# Patient Record
Sex: MALE | Race: WHITE | NOT HISPANIC OR LATINO | Employment: OTHER | URBAN - NONMETROPOLITAN AREA
[De-identification: names, ages, dates, MRNs, and addresses within clinical notes are randomized per-mention and may not be internally consistent; named-entity substitution may affect disease eponyms.]

---

## 2017-09-14 ENCOUNTER — APPOINTMENT (OUTPATIENT)
Dept: ANESTHESIOLOGY | Facility: HOSPITAL | Age: 77
End: 2017-09-14
Attending: OPHTHALMOLOGY
Payer: MEDICARE

## 2017-09-14 PROCEDURE — 00142 ANES PX ON EYE LENS SURGERY: CPT | Mod: QZ | Performed by: NURSE ANESTHETIST, CERTIFIED REGISTERED

## 2017-09-14 PROCEDURE — 99100 ANES PT EXTEME AGE<1 YR&>70: CPT | Performed by: NURSE ANESTHETIST, CERTIFIED REGISTERED

## 2024-05-31 ENCOUNTER — HOSPITAL ENCOUNTER (INPATIENT)
Facility: CLINIC | Age: 84
LOS: 11 days | Discharge: HOME | DRG: 394 | End: 2024-06-11
Attending: EMERGENCY MEDICINE | Admitting: STUDENT IN AN ORGANIZED HEALTH CARE EDUCATION/TRAINING PROGRAM
Payer: MEDICARE

## 2024-05-31 ENCOUNTER — APPOINTMENT (OUTPATIENT)
Dept: CT IMAGING | Facility: CLINIC | Age: 84
DRG: 394 | End: 2024-05-31
Attending: EMERGENCY MEDICINE
Payer: MEDICARE

## 2024-05-31 DIAGNOSIS — R10.84 GENERALIZED ABDOMINAL PAIN: ICD-10-CM

## 2024-05-31 DIAGNOSIS — K56.7 ILEUS (H): ICD-10-CM

## 2024-05-31 DIAGNOSIS — I48.91 ATRIAL FIBRILLATION WITH RVR (H): Primary | ICD-10-CM

## 2024-05-31 LAB
ALBUMIN SERPL BCG-MCNC: 3.4 G/DL (ref 3.5–5.2)
ALBUMIN UR-MCNC: NEGATIVE MG/DL
ALP SERPL-CCNC: 79 U/L (ref 40–150)
ALT SERPL W P-5'-P-CCNC: 14 U/L (ref 0–70)
ANION GAP SERPL CALCULATED.3IONS-SCNC: 12 MMOL/L (ref 7–15)
APPEARANCE UR: CLEAR
AST SERPL W P-5'-P-CCNC: 20 U/L (ref 0–45)
BASOPHILS # BLD AUTO: 0 10E3/UL (ref 0–0.2)
BASOPHILS NFR BLD AUTO: 0 %
BILIRUB SERPL-MCNC: 1.3 MG/DL
BILIRUB UR QL STRIP: NEGATIVE
BUN SERPL-MCNC: 17.8 MG/DL (ref 8–23)
CALCIUM SERPL-MCNC: 8.2 MG/DL (ref 8.8–10.2)
CHLORIDE SERPL-SCNC: 104 MMOL/L (ref 98–107)
COLOR UR AUTO: ABNORMAL
CREAT SERPL-MCNC: 0.77 MG/DL (ref 0.67–1.17)
DEPRECATED HCO3 PLAS-SCNC: 22 MMOL/L (ref 22–29)
EGFRCR SERPLBLD CKD-EPI 2021: 89 ML/MIN/1.73M2
EOSINOPHIL # BLD AUTO: 0 10E3/UL (ref 0–0.7)
EOSINOPHIL NFR BLD AUTO: 0 %
ERYTHROCYTE [DISTWIDTH] IN BLOOD BY AUTOMATED COUNT: 12.5 % (ref 10–15)
GLUCOSE SERPL-MCNC: 127 MG/DL (ref 70–99)
GLUCOSE UR STRIP-MCNC: NEGATIVE MG/DL
HCT VFR BLD AUTO: 39.7 % (ref 40–53)
HGB BLD-MCNC: 13.7 G/DL (ref 13.3–17.7)
HGB UR QL STRIP: ABNORMAL
IMM GRANULOCYTES # BLD: 0.1 10E3/UL
IMM GRANULOCYTES NFR BLD: 0 %
KETONES UR STRIP-MCNC: NEGATIVE MG/DL
LEUKOCYTE ESTERASE UR QL STRIP: NEGATIVE
LIPASE SERPL-CCNC: 20 U/L (ref 13–60)
LYMPHOCYTES # BLD AUTO: 1.1 10E3/UL (ref 0.8–5.3)
LYMPHOCYTES NFR BLD AUTO: 9 %
MCH RBC QN AUTO: 31.9 PG (ref 26.5–33)
MCHC RBC AUTO-ENTMCNC: 34.5 G/DL (ref 31.5–36.5)
MCV RBC AUTO: 92 FL (ref 78–100)
MONOCYTES # BLD AUTO: 1 10E3/UL (ref 0–1.3)
MONOCYTES NFR BLD AUTO: 8 %
MUCOUS THREADS #/AREA URNS LPF: PRESENT /LPF
NEUTROPHILS # BLD AUTO: 9.7 10E3/UL (ref 1.6–8.3)
NEUTROPHILS NFR BLD AUTO: 83 %
NITRATE UR QL: NEGATIVE
NRBC # BLD AUTO: 0 10E3/UL
NRBC BLD AUTO-RTO: 0 /100
PH UR STRIP: 6 [PH] (ref 5–7)
PLATELET # BLD AUTO: 141 10E3/UL (ref 150–450)
POTASSIUM SERPL-SCNC: 4.2 MMOL/L (ref 3.4–5.3)
PROT SERPL-MCNC: 6.2 G/DL (ref 6.4–8.3)
RBC # BLD AUTO: 4.3 10E6/UL (ref 4.4–5.9)
RBC URINE: 1 /HPF
SODIUM SERPL-SCNC: 138 MMOL/L (ref 135–145)
SP GR UR STRIP: 1.02 (ref 1–1.03)
TROPONIN T SERPL HS-MCNC: 21 NG/L
UROBILINOGEN UR STRIP-MCNC: NORMAL MG/DL
WBC # BLD AUTO: 11.9 10E3/UL (ref 4–11)
WBC URINE: <1 /HPF

## 2024-05-31 PROCEDURE — 84155 ASSAY OF PROTEIN SERUM: CPT | Performed by: EMERGENCY MEDICINE

## 2024-05-31 PROCEDURE — 84484 ASSAY OF TROPONIN QUANT: CPT | Performed by: EMERGENCY MEDICINE

## 2024-05-31 PROCEDURE — 74177 CT ABD & PELVIS W/CONTRAST: CPT | Mod: MG

## 2024-05-31 PROCEDURE — 83690 ASSAY OF LIPASE: CPT | Performed by: EMERGENCY MEDICINE

## 2024-05-31 PROCEDURE — 250N000013 HC RX MED GY IP 250 OP 250 PS 637: Performed by: PHYSICIAN ASSISTANT

## 2024-05-31 PROCEDURE — 99285 EMERGENCY DEPT VISIT HI MDM: CPT | Mod: 25

## 2024-05-31 PROCEDURE — 258N000003 HC RX IP 258 OP 636: Performed by: EMERGENCY MEDICINE

## 2024-05-31 PROCEDURE — 99222 1ST HOSP IP/OBS MODERATE 55: CPT | Mod: AI | Performed by: PHYSICIAN ASSISTANT

## 2024-05-31 PROCEDURE — 250N000011 HC RX IP 250 OP 636: Performed by: EMERGENCY MEDICINE

## 2024-05-31 PROCEDURE — 120N000001 HC R&B MED SURG/OB

## 2024-05-31 PROCEDURE — 96360 HYDRATION IV INFUSION INIT: CPT | Mod: 59

## 2024-05-31 PROCEDURE — 81001 URINALYSIS AUTO W/SCOPE: CPT | Performed by: EMERGENCY MEDICINE

## 2024-05-31 PROCEDURE — 258N000003 HC RX IP 258 OP 636: Performed by: PHYSICIAN ASSISTANT

## 2024-05-31 PROCEDURE — 85025 COMPLETE CBC W/AUTO DIFF WBC: CPT | Performed by: EMERGENCY MEDICINE

## 2024-05-31 PROCEDURE — G1010 CDSM STANSON: HCPCS

## 2024-05-31 PROCEDURE — 36415 COLL VENOUS BLD VENIPUNCTURE: CPT | Performed by: EMERGENCY MEDICINE

## 2024-05-31 PROCEDURE — 250N000011 HC RX IP 250 OP 636: Mod: JZ | Performed by: EMERGENCY MEDICINE

## 2024-05-31 PROCEDURE — 84460 ALANINE AMINO (ALT) (SGPT): CPT | Performed by: EMERGENCY MEDICINE

## 2024-05-31 PROCEDURE — C9113 INJ PANTOPRAZOLE SODIUM, VIA: HCPCS | Performed by: PHYSICIAN ASSISTANT

## 2024-05-31 PROCEDURE — 250N000011 HC RX IP 250 OP 636: Performed by: PHYSICIAN ASSISTANT

## 2024-05-31 RX ORDER — OXYCODONE HYDROCHLORIDE 5 MG/1
5 TABLET ORAL EVERY 4 HOURS PRN
Status: ON HOLD | COMMUNITY
End: 2024-06-11

## 2024-05-31 RX ORDER — SENNOSIDES 8.6 MG
2 TABLET ORAL 2 TIMES DAILY
COMMUNITY

## 2024-05-31 RX ORDER — LIDOCAINE 40 MG/G
CREAM TOPICAL
Status: DISCONTINUED | OUTPATIENT
Start: 2024-05-31 | End: 2024-06-11 | Stop reason: HOSPADM

## 2024-05-31 RX ORDER — OMEPRAZOLE 40 MG/1
40 CAPSULE, DELAYED RELEASE ORAL DAILY
COMMUNITY

## 2024-05-31 RX ORDER — NALOXONE HYDROCHLORIDE 0.4 MG/ML
0.4 INJECTION, SOLUTION INTRAMUSCULAR; INTRAVENOUS; SUBCUTANEOUS
Status: DISCONTINUED | OUTPATIENT
Start: 2024-05-31 | End: 2024-06-11 | Stop reason: HOSPADM

## 2024-05-31 RX ORDER — METHOCARBAMOL 500 MG/1
500 TABLET, FILM COATED ORAL EVERY 6 HOURS PRN
COMMUNITY

## 2024-05-31 RX ORDER — ONDANSETRON 4 MG/1
4 TABLET, ORALLY DISINTEGRATING ORAL EVERY 6 HOURS PRN
Status: DISCONTINUED | OUTPATIENT
Start: 2024-05-31 | End: 2024-06-11 | Stop reason: HOSPADM

## 2024-05-31 RX ORDER — MORPHINE SULFATE 4 MG/ML
4 INJECTION, SOLUTION INTRAMUSCULAR; INTRAVENOUS ONCE
Status: COMPLETED | OUTPATIENT
Start: 2024-05-31 | End: 2024-05-31

## 2024-05-31 RX ORDER — ACETAMINOPHEN 325 MG/1
650 TABLET ORAL EVERY 4 HOURS PRN
Status: DISCONTINUED | OUTPATIENT
Start: 2024-05-31 | End: 2024-06-01

## 2024-05-31 RX ORDER — OXYCODONE HYDROCHLORIDE 5 MG/1
5 TABLET ORAL EVERY 4 HOURS PRN
Status: DISCONTINUED | OUTPATIENT
Start: 2024-05-31 | End: 2024-06-01

## 2024-05-31 RX ORDER — SODIUM CHLORIDE 9 MG/ML
INJECTION, SOLUTION INTRAVENOUS CONTINUOUS
Status: DISCONTINUED | OUTPATIENT
Start: 2024-05-31 | End: 2024-06-07

## 2024-05-31 RX ORDER — TAMSULOSIN HYDROCHLORIDE 0.4 MG/1
0.4 CAPSULE ORAL AT BEDTIME
Status: DISCONTINUED | OUTPATIENT
Start: 2024-05-31 | End: 2024-06-11 | Stop reason: HOSPADM

## 2024-05-31 RX ORDER — SODIUM CHLORIDE 9 MG/ML
INJECTION, SOLUTION INTRAVENOUS CONTINUOUS
Status: DISCONTINUED | OUTPATIENT
Start: 2024-05-31 | End: 2024-05-31

## 2024-05-31 RX ORDER — ATORVASTATIN CALCIUM 40 MG/1
40 TABLET, FILM COATED ORAL DAILY
COMMUNITY

## 2024-05-31 RX ORDER — ONDANSETRON 2 MG/ML
4 INJECTION INTRAMUSCULAR; INTRAVENOUS ONCE
Status: COMPLETED | OUTPATIENT
Start: 2024-05-31 | End: 2024-05-31

## 2024-05-31 RX ORDER — ONDANSETRON 2 MG/ML
4 INJECTION INTRAMUSCULAR; INTRAVENOUS EVERY 6 HOURS PRN
Status: DISCONTINUED | OUTPATIENT
Start: 2024-05-31 | End: 2024-06-11 | Stop reason: HOSPADM

## 2024-05-31 RX ORDER — ONDANSETRON 2 MG/ML
INJECTION INTRAMUSCULAR; INTRAVENOUS
Status: COMPLETED
Start: 2024-05-31 | End: 2024-05-31

## 2024-05-31 RX ORDER — TAMSULOSIN HYDROCHLORIDE 0.4 MG/1
0.4 CAPSULE ORAL AT BEDTIME
COMMUNITY

## 2024-05-31 RX ORDER — MORPHINE SULFATE 2 MG/ML
2 INJECTION, SOLUTION INTRAMUSCULAR; INTRAVENOUS ONCE
Status: DISCONTINUED | OUTPATIENT
Start: 2024-05-31 | End: 2024-05-31

## 2024-05-31 RX ORDER — IOPAMIDOL 755 MG/ML
100 INJECTION, SOLUTION INTRAVASCULAR ONCE
Status: COMPLETED | OUTPATIENT
Start: 2024-05-31 | End: 2024-05-31

## 2024-05-31 RX ORDER — HYDROMORPHONE HCL IN WATER/PF 6 MG/30 ML
0.2 PATIENT CONTROLLED ANALGESIA SYRINGE INTRAVENOUS
Status: DISCONTINUED | OUTPATIENT
Start: 2024-05-31 | End: 2024-06-01

## 2024-05-31 RX ORDER — NALOXONE HYDROCHLORIDE 0.4 MG/ML
0.2 INJECTION, SOLUTION INTRAMUSCULAR; INTRAVENOUS; SUBCUTANEOUS
Status: DISCONTINUED | OUTPATIENT
Start: 2024-05-31 | End: 2024-06-11 | Stop reason: HOSPADM

## 2024-05-31 RX ORDER — ACETAMINOPHEN 325 MG/1
975 TABLET ORAL EVERY 6 HOURS PRN
COMMUNITY

## 2024-05-31 RX ADMIN — TAMSULOSIN HYDROCHLORIDE 0.4 MG: 0.4 CAPSULE ORAL at 21:05

## 2024-05-31 RX ADMIN — HYDROMORPHONE HYDROCHLORIDE 0.2 MG: 0.2 INJECTION, SOLUTION INTRAMUSCULAR; INTRAVENOUS; SUBCUTANEOUS at 22:28

## 2024-05-31 RX ADMIN — HYDROMORPHONE HYDROCHLORIDE 0.2 MG: 0.2 INJECTION, SOLUTION INTRAMUSCULAR; INTRAVENOUS; SUBCUTANEOUS at 18:29

## 2024-05-31 RX ADMIN — ONDANSETRON 4 MG: 2 INJECTION INTRAMUSCULAR; INTRAVENOUS at 10:33

## 2024-05-31 RX ADMIN — SODIUM CHLORIDE: 900 INJECTION INTRAVENOUS at 20:48

## 2024-05-31 RX ADMIN — SODIUM CHLORIDE: 900 INJECTION INTRAVENOUS at 12:01

## 2024-05-31 RX ADMIN — SODIUM CHLORIDE: 9 INJECTION, SOLUTION INTRAVENOUS at 10:34

## 2024-05-31 RX ADMIN — IOPAMIDOL 100 ML: 755 INJECTION, SOLUTION INTRAVENOUS at 09:03

## 2024-05-31 RX ADMIN — SODIUM CHLORIDE 1000 ML: 9 INJECTION, SOLUTION INTRAVENOUS at 08:12

## 2024-05-31 RX ADMIN — MORPHINE SULFATE 4 MG: 4 INJECTION INTRAVENOUS at 10:23

## 2024-05-31 RX ADMIN — PANTOPRAZOLE SODIUM 40 MG: 40 INJECTION, POWDER, FOR SOLUTION INTRAVENOUS at 12:02

## 2024-05-31 ASSESSMENT — ACTIVITIES OF DAILY LIVING (ADL)
ADLS_ACUITY_SCORE: 35
ADLS_ACUITY_SCORE: 41
ADLS_ACUITY_SCORE: 43
ADLS_ACUITY_SCORE: 35
ADLS_ACUITY_SCORE: 43
ADLS_ACUITY_SCORE: 33
ADLS_ACUITY_SCORE: 35
ADLS_ACUITY_SCORE: 35
ADLS_ACUITY_SCORE: 43
ADLS_ACUITY_SCORE: 35
ADLS_ACUITY_SCORE: 43
ADLS_ACUITY_SCORE: 41
ADLS_ACUITY_SCORE: 35
ADLS_ACUITY_SCORE: 43
ADLS_ACUITY_SCORE: 35
ADLS_ACUITY_SCORE: 35

## 2024-05-31 ASSESSMENT — COLUMBIA-SUICIDE SEVERITY RATING SCALE - C-SSRS
2. HAVE YOU ACTUALLY HAD ANY THOUGHTS OF KILLING YOURSELF IN THE PAST MONTH?: NO
1. IN THE PAST MONTH, HAVE YOU WISHED YOU WERE DEAD OR WISHED YOU COULD GO TO SLEEP AND NOT WAKE UP?: NO
6. HAVE YOU EVER DONE ANYTHING, STARTED TO DO ANYTHING, OR PREPARED TO DO ANYTHING TO END YOUR LIFE?: NO

## 2024-05-31 NOTE — PLAN OF CARE
"Pt arrived on the unit at 1430. VSS. Transferred to bed with slide board. Lumbar dressing with scant drainage. NPO. Audible bowel sounds. Pt unable to pass gas. Son at bedside.     Problem: Adult Inpatient Plan of Care  Goal: Plan of Care Review  Description: The Plan of Care Review/Shift note should be completed every shift.  The Outcome Evaluation is a brief statement about your assessment that the patient is improving, declining, or no change.  This information will be displayed automatically on your shift  note.  Outcome: Progressing  Flowsheets (Taken 5/31/2024 1644)  Outcome Evaluation: NPO, bowel sounds audible, pt feels bloated and distended. unable to pass gas. no emesis, occasional nausea.  Plan of Care Reviewed With:   patient   child  Overall Patient Progress: no change  Goal: Patient-Specific Goal (Individualized)  Description: You can add care plan individualizations to a care plan. Examples of Individualization might be:  \"Parent requests to be called daily at 9am for status\", \"I have a hard time hearing out of my right ear\", or \"Do not touch me to wake me up as it startles  me\".  Outcome: Progressing  Goal: Absence of Hospital-Acquired Illness or Injury  Outcome: Progressing  Intervention: Identify and Manage Fall Risk  Recent Flowsheet Documentation  Taken 5/31/2024 1424 by Yun Wilson RN  Safety Promotion/Fall Prevention:   activity supervised   assistive device/personal items within reach  Intervention: Prevent Skin Injury  Recent Flowsheet Documentation  Taken 5/31/2024 1424 by Yun Wilson RN  Body Position: supine, head elevated  Device Skin Pressure Protection: absorbent pad utilized/changed  Intervention: Prevent Infection  Recent Flowsheet Documentation  Taken 5/31/2024 1424 by Yun Wilson RN  Infection Prevention: single patient room provided  Goal: Optimal Comfort and Wellbeing  Outcome: Progressing  Intervention: Monitor Pain and Promote Comfort  Recent Flowsheet " Documentation  Taken 5/31/2024 1424 by Yun Wilson RN  Pain Management Interventions:   pillow support provided   relaxation techniques promoted   repositioned  Goal: Readiness for Transition of Care  Outcome: Progressing     Problem: Intestinal Obstruction  Goal: Optimal Bowel Function  Outcome: Progressing  Intervention: Promote Bowel Function  Recent Flowsheet Documentation  Taken 5/31/2024 1424 by uYn Wilson RN  Body Position: supine, head elevated  Head of Bed (HOB) Positioning: HOB at 45 degrees  Goal: Fluid and Electrolyte Balance  Outcome: Progressing  Goal: Absence of Infection Signs and Symptoms  Outcome: Progressing  Goal: Optimize Nutrition Status  Outcome: Progressing  Goal: Optimal Pain Control and Function  Outcome: Progressing  Intervention: Prevent or Manage Pain  Recent Flowsheet Documentation  Taken 5/31/2024 1424 by Yun Wilson RN  Pain Management Interventions:   pillow support provided   relaxation techniques promoted   repositioned   Goal Outcome Evaluation:      Plan of Care Reviewed With: patient, child    Overall Patient Progress: no changeOverall Patient Progress: no change    Outcome Evaluation: NPO, bowel sounds audible, pt feels bloated and distended. unable to pass gas. no emesis, occasional nausea.

## 2024-05-31 NOTE — PHARMACY-ADMISSION MEDICATION HISTORY
Pharmacist Admission Medication History    Admission medication history is complete. The information provided in this note is only as accurate as the sources available at the time of the update.    Information Source(s): Patient via in-person    Pertinent Information:      Changes made to PTA medication list:  Added: all meds  Deleted: None  Changed: None    Allergies reviewed with patient and updates made in EHR: yes    Medication History Completed By: Jessica Jordan [facility] 5/31/2024 1:01 PM    PTA Med List   Medication Sig Note Last Dose    acetaminophen (TYLENOL) 325 MG tablet Take 975 mg by mouth every 6 hours as needed for mild pain      apixaban ANTICOAGULANT (ELIQUIS) 5 MG tablet Take 5 mg by mouth 2 times daily 5/31/2024: Advised to start on 6/1/24, as had surgery on May 28th     atorvastatin (LIPITOR) 40 MG tablet Take 40 mg by mouth daily  5/30/2024 at am    methocarbamol (ROBAXIN) 500 MG tablet Take 500 mg by mouth every 6 hours as needed for muscle spasms      omeprazole (PRILOSEC) 40 MG DR capsule Take 40 mg by mouth daily  5/30/2024 at am    oxyCODONE (ROXICODONE) 5 MG tablet Take 5 mg by mouth every 4 hours as needed for severe pain      sennosides (SENOKOT) 8.6 MG tablet Take 2 tablets by mouth 2 times daily Since started on oxycodone      tamsulosin (FLOMAX) 0.4 MG capsule Take 0.4 mg by mouth at bedtime  5/30/2024 at hs

## 2024-05-31 NOTE — ED TRIAGE NOTES
Spine surgery on this past Tuesday. Has been taking pain meds and stool softener. Last BM on Wednesday. VSS.

## 2024-05-31 NOTE — H&P
[facility]  Internal Medicine  History and Physical      Patient Name: Eddie Peralta MRN# [id number]   Age: 84 year old YOB: 1940     Date of Admission:5/31/2024    Primary care provider: No Ref-Primary, Physician  Date of Service: 5/31/2024         Assessment and Plan:   Eddie Peralta is a 84 year old male with a history of Cataract, Arthritis, Atrial Fibrillation, GERD, HLD, BPH, DDD, Spinal Stenosis who presents to the emergency department for evaluation of constipation and abdominal pain following a spinal fusion of L4 and L5 on 5/28 at [facility]      Post Op Ileus  Pt presents with acute onset of abdominal pain, distension and nausea overnight.  Last BM on 5/29.  Using Oxycodone for pain control after his spine surgery on 5/28.  Pt is afebrile, wbc 11.9 and CT abd/pelvis reveals a mildly dilated distal ileal small bowel loops and ascending colon with tapering to nondilated transverse/distal colon consistent with ileus. No discrete transition point.  - npo, IVF, antiemetics, analgesics prn; try to avoid narcotics  - monitor serial abdominal exams  - plan for now to repeat AXR in am    Lumbar Spinal Stenosis  S/p Transfacet/Transforaminal L4 to: L5 Posterior Spine Fusion with Instrumentation L4 to: L5 Transforaminal Lumbar Interbody Fusion L4 to: L5 on 5/28/24 at [facility]   Surgical incision examined by ED physician with no signs of infection.  - continue brace and post op lifting and bending restrictions  - will have PT/SW consult    Chronic Atrial Fibrillation  Diagnosed 2022 s/p Cardioversion 7/2022 with recurrence shortly after.  No hx of obstructive CAD or CHF per last Cardiology note 5/2023.  Has a prescriptions for Lasix prn for edema.  - per family, Eliquis was planned to be resumed tomorrow 6/1/24 per surgery    HLD  - resume pta Atorvastatin when taking po well    GERD  Hx Conti's Esophagus  - hold pta Omeprazole and start IV Protonix    BPH  - continue Flomax and monitor  PVR      CODE: full  Diet/IVF: npo, NS  GI ppx:  protonix  DVT ppx: scd    Poonam Christiansenjoselito BRAVOC  Physician Assistant   Hospitalist Service    Awaiting formal pharmacy med rec to confirm home medications.             Chief Complaint:   Abdominal Pain         HPI:   84 year old male with a history of Cataract, Arthritis, Atrial Fibrillation, GERD, HLD, BPH, DDD, Spinal Stenosis who presents to the emergency department for evaluation of constipation and abdominal pain following a spinal fusion of L4 and L5 on 5/28 at [facility].      Patient lives in [place] where he receives all of his routine medical care.  He is living in the [place] temporarily whit his son after he recovers from his spinal surgery.    Patient was discharged from [facility] hospital yesterday around 1300 to his son's home after he underwent a spinal fusion of L4 and L5 on 5/28/24.  Patient reports his back pain has been slowly improving.  He has been wearing a brace and using a walker.  He denies any redness or drainage from the wound.  Denies any LE radiculopathy.      He developed abdominal pain last night across the abdomen worse around the umbilicus which comes in waves. He has felt distended and bloated but feels this has slightly improved since last night.  He has had nausea without any emesis.  His last BM was on 5/29 and he denies passing any flatus.  He has been using Oxycodone for pain control.  He had an episode of diaphoresis, chills, nausea when he was up ambulating to the bathroom he attributes to severe abdominal pain at that times.          Family called the on call surgeon number at [facility] who reported he may present to any ED for evaluation.         Past Medical History:   Cataract, Arthritis, Atrial Fibrillation, GERD, HLD, BPH, DDD, Spinal Stenosis       Past Surgical History:     CATARACT EXTRACTION   Bilateral Dr. Cantu, around 2015    BLEPHAROPLASTY 1/1/2021 - 12/31/2021 Bilateral [place]    APPENDECTOMY             Social  History:     Social History     Socioeconomic History    Marital status: Single     Spouse name: Not on file    Number of children: Not on file    Years of education: Not on file    Highest education level: Not on file   Occupational History    Not on file   Tobacco Use    Smoking status: Not on file    Smokeless tobacco: Not on file   Substance and Sexual Activity    Alcohol use: Not on file    Drug use: Not on file    Sexual activity: Not on file   Other Topics Concern    Not on file   Social History Narrative    Not on file     Social Determinants of Health     Financial Resource Strain: Low Risk  (5/28/2024)    Received from [facility]    Financial Resource Strain     Difficulty of Paying Living Expenses: 3     Difficulty of Paying Living Expenses: Not on file   Food Insecurity: No Food Insecurity (5/28/2024)    Received from [facility]    Food Insecurity     Worried About Running Out of Food in the Last Year: 1   Transportation Needs: No Transportation Needs (5/28/2024)    Received from [facility]    Transportation Needs     Lack of Transportation (Medical): 1   Physical Activity: Insufficiently Active (4/3/2024)    Received from [facility] [employer] [facility]    Exercise Vital Sign     Days of Exercise per Week: 4 days     Minutes of Exercise per Session: 20 min   Stress: Not on file   Social Connections: Socially Integrated (5/28/2024)    Received from [employer] [facility]    Social Connections     Frequency of Communication with Friends and Family: 0   Interpersonal Safety: Not on file   Housing Stability: Low Risk  (5/28/2024)    Received from [facility]    Housing Stability     Unable to Pay for Housing in the Last Year: 1          Family History:     Diabetes Father          Allergies:    No Known Allergies       Medications:   Awaiting formal med  "rec         Review of Systems:   A complete ROS was performed and is negative other than what is stated in the HPI.       Physical Exam:   Blood pressure (!) 170/99, pulse 67, temperature 98.8  F (37.1  C), temperature source Tympanic, resp. rate 18, height 1.778 m (5' 10\"), weight 90.7 kg (200 lb), SpO2 98%.  General: Alert, interactive, NAD, family at the bedside pleasant and cooperative.  HEENT: AT/NC  Chest/Resp: clear to auscultation bilaterally, no crackles or wheezes  Heart/CV: regular rate and rhythm, no murmur  Abdomen/GI: Soft, mild central abdominal tenderness, mild distension Hypoactive BS.   Extremities/MSK: No LE edema.  Spinal incision covered with a bandage.  Examined by ED physician with no reports of redness or drainage.  Skin: Warm and dry  Neuro: Alert & oriented x 3, no focal deficits, moves all extremities equally           Labs:   ROUTINE ICU LABS (Last four results)  CMP  Recent Labs   Lab 05/31/24  0810      POTASSIUM 4.2   CHLORIDE 104   CO2 22   ANIONGAP 12   *   BUN 17.8   CR 0.77   GFRESTIMATED 89   REN 8.2*   PROTTOTAL 6.2*   ALBUMIN 3.4*   BILITOTAL 1.3*   ALKPHOS 79   AST 20   ALT 14     CBC  Recent Labs   Lab 05/31/24  0810   WBC 11.9*   RBC 4.30*   HGB 13.7   HCT 39.7*   MCV 92   MCH 31.9   MCHC 34.5   RDW 12.5   *     INRNo lab results found in last 7 days.  Arterial Blood GasNo lab results found in last 7 days.       Imaging/Procedures:     Results for orders placed or performed during the hospital encounter of 05/31/24   CT Abdomen Pelvis w Contrast    Narrative    CT ABDOMEN/PELVIS WITH CONTRAST May 31, 2024 9:06 AM    CLINICAL HISTORY: Abdominal pain and distention.    TECHNIQUE: CT scan of the abdomen and pelvis was performed following  injection of IV contrast. Multiplanar reformats were obtained. Dose  reduction techniques were used.  CONTRAST: 100mL Isovue-370.    COMPARISON: None.    FINDINGS:   LOWER CHEST: Trace bibasilar atelectasis. Tiny hiatal " hernia.    HEPATOBILIARY: Cholelithiasis. Left hepatic lobe subcentimeter  hypodensity is too small to characterize, although most likely benign.    PANCREAS: No significant mass, duct dilatation, or inflammatory  change.    SPLEEN: Unremarkable.    ADRENAL GLANDS: No significant nodules.    KIDNEYS: No significant mass, stones, or hydronephrosis.    BOWEL: Mildly distended ascending colon (up to 7 cm) and distal ileal  small bowel loops (up to 3.5 cm) with tapering to nondilated  transverse/distal colon. No discrete transition point. No pneumatosis.  No significant wall thickening.    VASCULATURE: Mild atherosclerosis. Aortic branch which vessels are  grossly patent.    LYMPH NODE AND PERITONEUM: No enlarged lymph node. Trace  abdominopelvic ascites. No free air.    PELVIS: No pelvic mass.    MUSCULOSKELETAL: Tiny fat-containing umbilical hernia. Lower lumbar  spine fusion hardware. No destructive osseous lesion.    OTHER: None.      Impression    IMPRESSION:   1.  Mildly dilated distal ileal small bowel loops and ascending colon  with tapering to nondilated transverse/distal colon consistent with  ileus. No discrete transition point.  2.  Trace abdominopelvic ascites.  3.  Cholelithiasis.

## 2024-05-31 NOTE — ED NOTES
"[facility]  ED Nurse Handoff Report    ED Chief complaint: Constipation  . ED Diagnosis:   Final diagnoses:   Generalized abdominal pain   Ileus (H)       Allergies: No Known Allergies    Code Status: Full Code    Activity level - Baseline/Home:  standby.  Activity Level - Current:   assist of 1.   Lift room needed: No.   Bariatric: No   Needed: No   Isolation: No.   Infection: Not Applicable.     Respiratory status: Room air    Vital Signs (within 30 minutes):   Vitals:    05/31/24 0721   BP: (!) 170/99   Pulse: 67   Resp: 18   Temp: 98.8  F (37.1  C)   TempSrc: Tympanic   SpO2: 98%   Weight: 90.7 kg (200 lb)   Height: 1.778 m (5' 10\")       Cardiac Rhythm:  ,      Pain level:    Patient confused: No.   Patient Falls Risk: mobility aid in reach.   Elimination Status: Has voided     Patient Report - Per provider note: Eddie Peralta is a 84 year old male with a history of hyperlipidemia, atrial fibrillation, GERD, and spinal stenosis who presents to the emergency department for evaluation of constipation following a spinal fusion of L4 and L5 on 5/28. He was discharged from the hospital at about 1300 yesterday. Since last night he has had intense abdominal pain across his entire torso and especially into his lower abdomen. He also feels very distended and bloated. He has also had some intermittent chills as well as diaphoresis when he tried to get up and walk to the bathroom. The abdominal pain kept him up at night, and he has not been able to pass gas for several days. He did have a bowel movement 2 days prior in the hospital but has not had one since. The last BM he had was normal with no signs of hematochezia. He is feeling very lightheaded, as if he is going to pass out and has been very nauseas with no episodes of emesis. He rates the pain as a 7 or 8/10 in severity. He has been having some general fatigue as well and feels that he has no energy since coming home after surgery. " He was able to walk 100 steps in the hospital but is struggling to walk at all since he has been discharged. He had a decent appetite in the hospital but has been struggling to drink as much as he should and has had lower appetite since discharge. He denies any numbness in the saddle region or any incontinence with urination or stool. He feels that he has issues with dehydration frequently in the past. His grandson reports that he has been taking 5 mg of oxycodone and his last dose was 1030 last night. He was given 10 mg of oxycodone impatiently, and discharged with 5 mg doses. The constipation and abdominal pain started at about 0200 this morning. .   Focused Assessment:      Abnormal Results:   Labs Ordered and Resulted from Time of ED Arrival to Time of ED Departure   COMPREHENSIVE METABOLIC PANEL - Abnormal       Result Value    Sodium 138      Potassium 4.2      Carbon Dioxide (CO2) 22      Anion Gap 12      Urea Nitrogen 17.8      Creatinine 0.77      GFR Estimate 89      Calcium 8.2 (*)     Chloride 104      Glucose 127 (*)     Alkaline Phosphatase 79      AST 20      ALT 14      Protein Total 6.2 (*)     Albumin 3.4 (*)     Bilirubin Total 1.3 (*)    ROUTINE UA WITH MICROSCOPIC - Abnormal    Color Urine Light Yellow      Appearance Urine Clear      Glucose Urine Negative      Bilirubin Urine Negative      Ketones Urine Negative      Specific Gravity Urine 1.021      Blood Urine Small (*)     pH Urine 6.0      Protein Albumin Urine Negative      Urobilinogen Urine Normal      Nitrite Urine Negative      Leukocyte Esterase Urine Negative      Mucus Urine Present (*)     RBC Urine 1      WBC Urine <1     CBC WITH PLATELETS AND DIFFERENTIAL - Abnormal    WBC Count 11.9 (*)     RBC Count 4.30 (*)     Hemoglobin 13.7      Hematocrit 39.7 (*)     MCV 92      MCH 31.9      MCHC 34.5      RDW 12.5      Platelet Count 141 (*)     % Neutrophils 83      % Lymphocytes 9      % Monocytes 8      % Eosinophils 0      %  Basophils 0      % Immature Granulocytes 0      NRBCs per 100 WBC 0      Absolute Neutrophils 9.7 (*)     Absolute Lymphocytes 1.1      Absolute Monocytes 1.0      Absolute Eosinophils 0.0      Absolute Basophils 0.0      Absolute Immature Granulocytes 0.1      Absolute NRBCs 0.0     LIPASE - Normal    Lipase 20     TROPONIN T, HIGH SENSITIVITY - Normal    Troponin T, High Sensitivity 21          CT Abdomen Pelvis w Contrast   Preliminary Result   IMPRESSION:    1.  Mildly dilated distal ileal small bowel loops and ascending colon   with tapering to nondilated transverse/distal colon consistent with   ileus. No discrete transition point.   2.  Trace abdominopelvic ascites.   3.  Cholelithiasis.          Treatments provided:   Family Comments: family at bedside.   OBS brochure/video discussed/provided to patient:  N/A  ED Medications:   Medications   sodium chloride 0.9 % infusion ( Intravenous $New Bag 5/31/24 1034)   morphine (PF) injection 2 mg (has no administration in time range)   sodium chloride 0.9% BOLUS 1,000 mL (0 mLs Intravenous Stopped 5/31/24 0912)   iopamidol (ISOVUE-370) solution 100 mL (100 mLs Intravenous $Given 5/31/24 0903)   sodium chloride (PF) 0.9% PF flush 65 mL (65 mLs Intravenous $Given 5/31/24 0902)   morphine (PF) injection 4 mg (4 mg Intravenous $Given 5/31/24 1023)   ondansetron (ZOFRAN) injection 4 mg (4 mg Intravenous $Given 5/31/24 1033)       Drips infusing:  Yes  For the majority of the shift this patient was Green.   Interventions performed were .    Sepsis treatment initiated: No    Cares/treatment/interventions/medications to be completed following ED care:     ED Nurse Name: John Velasco RN  11:15 AM

## 2024-05-31 NOTE — ED PROVIDER NOTES
Emergency Department Note      History of Present Illness     Chief Complaint  Constipation    HPI  Eddie Peralta is a 84 year old male with a history of hyperlipidemia, atrial fibrillation, GERD, and spinal stenosis who presents to the emergency department for evaluation of constipation following a spinal fusion of L4 and L5 on 5/28. He was discharged from the hospital at about 1300 yesterday. Since last night he has had intense abdominal pain across his entire torso and especially into his lower abdomen. He also feels very distended and bloated. He has also had some intermittent chills as well as diaphoresis when he tried to get up and walk to the bathroom. The abdominal pain kept him up at night, and he has not been able to pass gas for several days. He did have a bowel movement 2 days prior in the hospital but has not had one since. The last BM he had was normal with no signs of hematochezia. He is feeling very lightheaded, as if he is going to pass out and has been very nauseas with no episodes of emesis. He rates the pain as a 7 or 8/10 in severity. He has been having some general fatigue as well and feels that he has no energy since coming home after surgery. He was able to walk 100 steps in the hospital but is struggling to walk at all since he has been discharged. He had a decent appetite in the hospital but has been struggling to drink as much as he should and has had lower appetite since discharge. He denies any numbness in the saddle region or any incontinence with urination or stool. He feels that he has issues with dehydration frequently in the past. His grandson reports that he has been taking 5 mg of oxycodone and his last dose was 1030 last night. He was given 10 mg of oxycodone impatiently, and discharged with 5 mg doses. The constipation and abdominal pain started at about 0200 this morning.     Independent Historian  None    Review of External Notes  I reviewed the procedure note from 5/28,  "which was a spinal fusion of L4/L5.   Past Medical History   Medical History and Problem List  Atrial Fibrillation  Benign prostatic hyperplasia  GERD  Spinal Stenosis, lumbar  Degenerative disc disease, lumbar  Nocturia  Dermatochalasis of both upper eyelids  Presbyopia  Pseudophakia B/L  Erectile dysfunction  Arthritis  Mixed hyperlipidemia  Microhematuria  Vitamin D deficiency  Hyperlipidemia  Benign neoplasm of colon  Lumbago  Diaphragmatic hernia  Anal and rectal polyp  Plantar fascial fibromatosis  Ankle fracture  Internal hemorrhoids    Medications  Atorvastatin  Furosemide  Omeprazole  Tamsulosin  Oxycodone  Apixaban  Sennosides  Cyclobenzaprine  Sildenafil citrate  Methocarbamol    Surgical History   Appendectomy  Blepharoplasty  Cataract Extraction  Colonoscopy  Mastectomy  Physical Exam   Patient Vitals for the past 24 hrs:   BP Temp Temp src Pulse Resp SpO2 Height Weight   05/31/24 1424 (!) 150/92 98.8  F (37.1  C) Temporal 98 16 97 % -- --   05/31/24 1358 -- -- -- -- -- -- -- 93 kg (205 lb 0.4 oz)   05/31/24 1332 (!) 154/92 -- -- 103 -- 97 % -- --   05/31/24 1246 -- -- -- -- -- 96 % -- --   05/31/24 1127 (!) 140/89 -- -- 95 -- 94 % -- --   05/31/24 0721 (!) 170/99 98.8  F (37.1  C) Tympanic 67 18 98 % 1.778 m (5' 10\") 90.7 kg (200 lb)     Physical Exam  General: The patient is alert, in no respiratory distress.    HENT: Mucous membranes moist.    Cardiovascular: Regular rate and rhythm. Good pulses in all four extremities. Normal capillary refill and skin turgor.     Respiratory: Lungs are clear. No nasal flaring. No retractions. No wheezing, no crackles.    Gastrointestinal: No guarding, no rebound. No palpable hernias. Abdomen is distended, full, and tender.     Musculoskeletal: No gross deformity.     Skin: No rashes or petechiae. Surgical site is well healed and no significant redness.    Neurologic: The patient is alert and oriented x3. GCS 15. No testable cranial nerve deficit. Follows commands " with clear and appropriate speech. Gives appropriate answers. Good strength in all extremities. No gross neurologic deficit. Gross sensation intact. Pupils are round and reactive. No meningismus.     Lymphatic: No cervical adenopathy. No lower extremity swelling.    Psychiatric: The patient is non-tearful.    Rectal Exam: Good tone, no masses, no palpable stool or visible blood.  Diagnostics   Lab Results   Labs Ordered and Resulted from Time of ED Arrival to Time of ED Departure   COMPREHENSIVE METABOLIC PANEL - Abnormal       Result Value    Sodium 138      Potassium 4.2      Carbon Dioxide (CO2) 22      Anion Gap 12      Urea Nitrogen 17.8      Creatinine 0.77      GFR Estimate 89      Calcium 8.2 (*)     Chloride 104      Glucose 127 (*)     Alkaline Phosphatase 79      AST 20      ALT 14      Protein Total 6.2 (*)     Albumin 3.4 (*)     Bilirubin Total 1.3 (*)    ROUTINE UA WITH MICROSCOPIC - Abnormal    Color Urine Light Yellow      Appearance Urine Clear      Glucose Urine Negative      Bilirubin Urine Negative      Ketones Urine Negative      Specific Gravity Urine 1.021      Blood Urine Small (*)     pH Urine 6.0      Protein Albumin Urine Negative      Urobilinogen Urine Normal      Nitrite Urine Negative      Leukocyte Esterase Urine Negative      Mucus Urine Present (*)     RBC Urine 1      WBC Urine <1     CBC WITH PLATELETS AND DIFFERENTIAL - Abnormal    WBC Count 11.9 (*)     RBC Count 4.30 (*)     Hemoglobin 13.7      Hematocrit 39.7 (*)     MCV 92      MCH 31.9      MCHC 34.5      RDW 12.5      Platelet Count 141 (*)     % Neutrophils 83      % Lymphocytes 9      % Monocytes 8      % Eosinophils 0      % Basophils 0      % Immature Granulocytes 0      NRBCs per 100 WBC 0      Absolute Neutrophils 9.7 (*)     Absolute Lymphocytes 1.1      Absolute Monocytes 1.0      Absolute Eosinophils 0.0      Absolute Basophils 0.0      Absolute Immature Granulocytes 0.1      Absolute NRBCs 0.0     LIPASE -  Normal    Lipase 20     TROPONIN T, HIGH SENSITIVITY - Normal    Troponin T, High Sensitivity 21         Imaging  CT Abdomen Pelvis w Contrast   Final Result   IMPRESSION:    1.  Mildly dilated distal ileal small bowel loops and ascending colon   with tapering to nondilated transverse/distal colon consistent with   ileus. No discrete transition point.   2.  Trace abdominopelvic ascites.   3.  Cholelithiasis.      CORRIE SANDHU MD            SYSTEM ID:  [id number]        Independent Interpretation  CT Abdomen shows colonic dilation suspicious for possible obstruction by my read  ED Course    Medications Administered  Medications   lidocaine 1 % 0.1-1 mL (has no administration in time range)   lidocaine (LMX4) cream (has no administration in time range)   sodium chloride (PF) 0.9% PF flush 3 mL (3 mLs Intracatheter $Given 5/31/24 1202)   sodium chloride (PF) 0.9% PF flush 3 mL (has no administration in time range)   sodium chloride 0.9 % infusion ( Intravenous Restarted 5/31/24 1400)   acetaminophen (TYLENOL) tablet 650 mg (has no administration in time range)   oxyCODONE IR (ROXICODONE) half-tab 2.5 mg (has no administration in time range)   oxyCODONE (ROXICODONE) tablet 5 mg (has no administration in time range)   HYDROmorphone (DILAUDID) injection 0.2 mg (has no administration in time range)   ondansetron (ZOFRAN ODT) ODT tab 4 mg (has no administration in time range)     Or   ondansetron (ZOFRAN) injection 4 mg (has no administration in time range)   pantoprazole (PROTONIX) IV push injection 40 mg (40 mg Intravenous $Given 5/31/24 1202)   tamsulosin (FLOMAX) capsule 0.4 mg (has no administration in time range)   sodium chloride 0.9% BOLUS 1,000 mL (0 mLs Intravenous Stopped 5/31/24 0912)   iopamidol (ISOVUE-370) solution 100 mL (100 mLs Intravenous $Given 5/31/24 0903)   sodium chloride (PF) 0.9% PF flush 65 mL (65 mLs Intravenous $Given 5/31/24 0902)   morphine (PF) injection 4 mg (4 mg Intravenous $Given  5/31/24 1023)   ondansetron (ZOFRAN) injection 4 mg (4 mg Intravenous $Given 5/31/24 1033)       Procedures  Procedures       Discussion of Management  Admitting Hospitalist, Poonam Acosta    Social Determinants of Health adding to complexity of care  None    ED Course  ED Course as of 05/31/24 1545   Fri May 31, 2024   0730 I obtained history and examined the patient as noted above.    0742 I performed a rectal exam. See exam notes.   0953 I rechecked the patient and updated.    1018 I spoke with the Hospitalist, Poonam Acosta.     Medical Decision Making / Diagnosis   CMS Diagnoses: None    MIPS     None    MDM  Eddie Peralta is a 84 year old male who has had a recent spinal fusion has been taking narcotics and therefore I felt that the decreased stooling could be due to constipation however his rectal exam was benign however the patient also was quite uncomfortable distended and I was suspicious there could be an obstruction.  Consideration was given to a neurologic cause however had good rectal tone.  A CT scan was ordered which did show signs of ileus I discussed this does not rule out an obstruction we held on an NG treat with pain medication and the patient was admitted to the hospital in good condition.    Disposition  The patient was admitted to the hospital.     ICD-10 Codes:    ICD-10-CM    1. Generalized abdominal pain  R10.84       2. Ileus (H)  K56.7            Discharge Medications  Current Discharge Medication List            Scribe Disclosure:  Lucy LIU, am serving as a scribe at 9:18 AM on 5/31/2024 to document services personally performed by Paul Soto MD based on my observations and the provider's statements to me.        Paul Soto MD  05/31/24 5481

## 2024-06-01 ENCOUNTER — APPOINTMENT (OUTPATIENT)
Dept: GENERAL RADIOLOGY | Facility: CLINIC | Age: 84
DRG: 394 | End: 2024-06-01
Attending: STUDENT IN AN ORGANIZED HEALTH CARE EDUCATION/TRAINING PROGRAM
Payer: MEDICARE

## 2024-06-01 ENCOUNTER — APPOINTMENT (OUTPATIENT)
Dept: CT IMAGING | Facility: CLINIC | Age: 84
DRG: 394 | End: 2024-06-01
Attending: STUDENT IN AN ORGANIZED HEALTH CARE EDUCATION/TRAINING PROGRAM
Payer: MEDICARE

## 2024-06-01 ENCOUNTER — APPOINTMENT (OUTPATIENT)
Dept: PHYSICAL THERAPY | Facility: CLINIC | Age: 84
DRG: 394 | End: 2024-06-01
Attending: PHYSICIAN ASSISTANT
Payer: MEDICARE

## 2024-06-01 ENCOUNTER — APPOINTMENT (OUTPATIENT)
Dept: GENERAL RADIOLOGY | Facility: CLINIC | Age: 84
DRG: 394 | End: 2024-06-01
Attending: PHYSICIAN ASSISTANT
Payer: MEDICARE

## 2024-06-01 LAB
ALBUMIN SERPL BCG-MCNC: 3.3 G/DL (ref 3.5–5.2)
ALBUMIN SERPL BCG-MCNC: 3.4 G/DL (ref 3.5–5.2)
ALBUMIN UR-MCNC: 30 MG/DL
ALP SERPL-CCNC: 77 U/L (ref 40–150)
ALP SERPL-CCNC: 77 U/L (ref 40–150)
ALT SERPL W P-5'-P-CCNC: 13 U/L (ref 0–70)
ALT SERPL W P-5'-P-CCNC: 9 U/L (ref 0–70)
ANION GAP SERPL CALCULATED.3IONS-SCNC: 14 MMOL/L (ref 7–15)
ANION GAP SERPL CALCULATED.3IONS-SCNC: 16 MMOL/L (ref 7–15)
APPEARANCE UR: CLEAR
AST SERPL W P-5'-P-CCNC: 10 U/L (ref 0–45)
AST SERPL W P-5'-P-CCNC: 17 U/L (ref 0–45)
B-OH-BUTYR SERPL-SCNC: 0.3 MMOL/L
BASE EXCESS BLDV CALC-SCNC: 0.5 MMOL/L (ref -3–3)
BILIRUB DIRECT SERPL-MCNC: 0.33 MG/DL (ref 0–0.3)
BILIRUB SERPL-MCNC: 1.4 MG/DL
BILIRUB SERPL-MCNC: 1.4 MG/DL
BILIRUB UR QL STRIP: NEGATIVE
BUN SERPL-MCNC: 22.4 MG/DL (ref 8–23)
BUN SERPL-MCNC: 24.7 MG/DL (ref 8–23)
CALCIUM SERPL-MCNC: 8.4 MG/DL (ref 8.8–10.2)
CALCIUM SERPL-MCNC: 8.6 MG/DL (ref 8.8–10.2)
CHLORIDE SERPL-SCNC: 102 MMOL/L (ref 98–107)
CHLORIDE SERPL-SCNC: 103 MMOL/L (ref 98–107)
COLOR UR AUTO: YELLOW
CREAT SERPL-MCNC: 0.77 MG/DL (ref 0.67–1.17)
CREAT SERPL-MCNC: 0.81 MG/DL (ref 0.67–1.17)
DEPRECATED HCO3 PLAS-SCNC: 19 MMOL/L (ref 22–29)
DEPRECATED HCO3 PLAS-SCNC: 22 MMOL/L (ref 22–29)
EGFRCR SERPLBLD CKD-EPI 2021: 88 ML/MIN/1.73M2
EGFRCR SERPLBLD CKD-EPI 2021: 89 ML/MIN/1.73M2
ERYTHROCYTE [DISTWIDTH] IN BLOOD BY AUTOMATED COUNT: 12.1 % (ref 10–15)
GLUCOSE SERPL-MCNC: 137 MG/DL (ref 70–99)
GLUCOSE SERPL-MCNC: 164 MG/DL (ref 70–99)
GLUCOSE UR STRIP-MCNC: 30 MG/DL
HCO3 BLDV-SCNC: 27 MMOL/L (ref 21–28)
HCT VFR BLD AUTO: 44.6 % (ref 40–53)
HGB BLD-MCNC: 14.9 G/DL (ref 13.3–17.7)
HGB BLD-MCNC: 15.3 G/DL (ref 13.3–17.7)
HGB UR QL STRIP: ABNORMAL
KETONES UR STRIP-MCNC: 10 MG/DL
LACTATE SERPL-SCNC: 2.6 MMOL/L (ref 0.7–2)
LACTATE SERPL-SCNC: 3 MMOL/L (ref 0.7–2)
LEUKOCYTE ESTERASE UR QL STRIP: NEGATIVE
MCH RBC QN AUTO: 32.3 PG (ref 26.5–33)
MCHC RBC AUTO-ENTMCNC: 34.3 G/DL (ref 31.5–36.5)
MCV RBC AUTO: 94 FL (ref 78–100)
MUCOUS THREADS #/AREA URNS LPF: PRESENT /LPF
NITRATE UR QL: NEGATIVE
O2/TOTAL GAS SETTING VFR VENT: 0 %
OXYHGB MFR BLDV: 41 % (ref 70–75)
PCO2 BLDV: 49 MM HG (ref 40–50)
PH BLDV: 7.35 [PH] (ref 7.32–7.43)
PH UR STRIP: 5.5 [PH] (ref 5–7)
PLATELET # BLD AUTO: 167 10E3/UL (ref 150–450)
PO2 BLDV: 24 MM HG (ref 25–47)
POTASSIUM SERPL-SCNC: 4.3 MMOL/L (ref 3.4–5.3)
POTASSIUM SERPL-SCNC: 4.7 MMOL/L (ref 3.4–5.3)
PROT SERPL-MCNC: 6.3 G/DL (ref 6.4–8.3)
PROT SERPL-MCNC: 6.4 G/DL (ref 6.4–8.3)
RBC # BLD AUTO: 4.73 10E6/UL (ref 4.4–5.9)
RBC URINE: 12 /HPF
SAO2 % BLDV: 41.7 % (ref 70–75)
SODIUM SERPL-SCNC: 138 MMOL/L (ref 135–145)
SODIUM SERPL-SCNC: 138 MMOL/L (ref 135–145)
SP GR UR STRIP: 1.03 (ref 1–1.03)
SQUAMOUS EPITHELIAL: <1 /HPF
UROBILINOGEN UR STRIP-MCNC: NORMAL MG/DL
WBC # BLD AUTO: 13.3 10E3/UL (ref 4–11)
WBC URINE: 1 /HPF

## 2024-06-01 PROCEDURE — 80053 COMPREHEN METABOLIC PANEL: CPT | Performed by: PHYSICIAN ASSISTANT

## 2024-06-01 PROCEDURE — 97116 GAIT TRAINING THERAPY: CPT | Mod: GP | Performed by: PHYSICAL THERAPIST

## 2024-06-01 PROCEDURE — 81001 URINALYSIS AUTO W/SCOPE: CPT | Performed by: STUDENT IN AN ORGANIZED HEALTH CARE EDUCATION/TRAINING PROGRAM

## 2024-06-01 PROCEDURE — 82805 BLOOD GASES W/O2 SATURATION: CPT | Performed by: STUDENT IN AN ORGANIZED HEALTH CARE EDUCATION/TRAINING PROGRAM

## 2024-06-01 PROCEDURE — G1010 CDSM STANSON: HCPCS

## 2024-06-01 PROCEDURE — 87040 BLOOD CULTURE FOR BACTERIA: CPT | Performed by: STUDENT IN AN ORGANIZED HEALTH CARE EDUCATION/TRAINING PROGRAM

## 2024-06-01 PROCEDURE — 80048 BASIC METABOLIC PNL TOTAL CA: CPT | Performed by: STUDENT IN AN ORGANIZED HEALTH CARE EDUCATION/TRAINING PROGRAM

## 2024-06-01 PROCEDURE — 97530 THERAPEUTIC ACTIVITIES: CPT | Mod: GP | Performed by: PHYSICAL THERAPIST

## 2024-06-01 PROCEDURE — 74177 CT ABD & PELVIS W/CONTRAST: CPT | Mod: MG

## 2024-06-01 PROCEDURE — 82010 KETONE BODYS QUAN: CPT | Performed by: STUDENT IN AN ORGANIZED HEALTH CARE EDUCATION/TRAINING PROGRAM

## 2024-06-01 PROCEDURE — 258N000003 HC RX IP 258 OP 636: Performed by: STUDENT IN AN ORGANIZED HEALTH CARE EDUCATION/TRAINING PROGRAM

## 2024-06-01 PROCEDURE — 99233 SBSQ HOSP IP/OBS HIGH 50: CPT | Performed by: STUDENT IN AN ORGANIZED HEALTH CARE EDUCATION/TRAINING PROGRAM

## 2024-06-01 PROCEDURE — 36415 COLL VENOUS BLD VENIPUNCTURE: CPT | Performed by: STUDENT IN AN ORGANIZED HEALTH CARE EDUCATION/TRAINING PROGRAM

## 2024-06-01 PROCEDURE — 250N000011 HC RX IP 250 OP 636: Performed by: STUDENT IN AN ORGANIZED HEALTH CARE EDUCATION/TRAINING PROGRAM

## 2024-06-01 PROCEDURE — 250N000013 HC RX MED GY IP 250 OP 250 PS 637: Performed by: STUDENT IN AN ORGANIZED HEALTH CARE EDUCATION/TRAINING PROGRAM

## 2024-06-01 PROCEDURE — 74019 RADEX ABDOMEN 2 VIEWS: CPT

## 2024-06-01 PROCEDURE — 82248 BILIRUBIN DIRECT: CPT | Performed by: STUDENT IN AN ORGANIZED HEALTH CARE EDUCATION/TRAINING PROGRAM

## 2024-06-01 PROCEDURE — 97161 PT EVAL LOW COMPLEX 20 MIN: CPT | Mod: GP | Performed by: PHYSICAL THERAPIST

## 2024-06-01 PROCEDURE — 85018 HEMOGLOBIN: CPT | Performed by: STUDENT IN AN ORGANIZED HEALTH CARE EDUCATION/TRAINING PROGRAM

## 2024-06-01 PROCEDURE — 999N000065 XR ABDOMEN PORT 1 VIEW

## 2024-06-01 PROCEDURE — 83605 ASSAY OF LACTIC ACID: CPT | Performed by: STUDENT IN AN ORGANIZED HEALTH CARE EDUCATION/TRAINING PROGRAM

## 2024-06-01 PROCEDURE — 250N000013 HC RX MED GY IP 250 OP 250 PS 637: Performed by: PHYSICIAN ASSISTANT

## 2024-06-01 PROCEDURE — 120N000001 HC R&B MED SURG/OB

## 2024-06-01 PROCEDURE — C9113 INJ PANTOPRAZOLE SODIUM, VIA: HCPCS | Performed by: PHYSICIAN ASSISTANT

## 2024-06-01 PROCEDURE — 36415 COLL VENOUS BLD VENIPUNCTURE: CPT | Performed by: PHYSICIAN ASSISTANT

## 2024-06-01 PROCEDURE — 85027 COMPLETE CBC AUTOMATED: CPT | Performed by: PHYSICIAN ASSISTANT

## 2024-06-01 PROCEDURE — 250N000011 HC RX IP 250 OP 636: Performed by: PHYSICIAN ASSISTANT

## 2024-06-01 PROCEDURE — 258N000003 HC RX IP 258 OP 636: Performed by: PHYSICIAN ASSISTANT

## 2024-06-01 PROCEDURE — 99221 1ST HOSP IP/OBS SF/LOW 40: CPT | Performed by: SURGERY

## 2024-06-01 RX ORDER — IOPAMIDOL 755 MG/ML
100 INJECTION, SOLUTION INTRAVASCULAR ONCE
Status: COMPLETED | OUTPATIENT
Start: 2024-06-01 | End: 2024-06-01

## 2024-06-01 RX ORDER — HYDROMORPHONE HCL IN WATER/PF 6 MG/30 ML
0.2 PATIENT CONTROLLED ANALGESIA SYRINGE INTRAVENOUS
Status: DISCONTINUED | OUTPATIENT
Start: 2024-06-01 | End: 2024-06-11 | Stop reason: HOSPADM

## 2024-06-01 RX ORDER — ACETAMINOPHEN 650 MG/1
650 SUPPOSITORY RECTAL EVERY 8 HOURS
Status: DISCONTINUED | OUTPATIENT
Start: 2024-06-01 | End: 2024-06-11 | Stop reason: HOSPADM

## 2024-06-01 RX ORDER — HEPARIN SODIUM 5000 [USP'U]/.5ML
5000 INJECTION, SOLUTION INTRAVENOUS; SUBCUTANEOUS EVERY 8 HOURS
Status: DISCONTINUED | OUTPATIENT
Start: 2024-06-01 | End: 2024-06-03

## 2024-06-01 RX ORDER — METHOCARBAMOL 500 MG/1
500 TABLET, FILM COATED ORAL EVERY 6 HOURS PRN
Status: DISCONTINUED | OUTPATIENT
Start: 2024-06-01 | End: 2024-06-11 | Stop reason: HOSPADM

## 2024-06-01 RX ORDER — HYDROMORPHONE HYDROCHLORIDE 1 MG/ML
0.3 INJECTION, SOLUTION INTRAMUSCULAR; INTRAVENOUS; SUBCUTANEOUS
Status: DISCONTINUED | OUTPATIENT
Start: 2024-06-01 | End: 2024-06-01

## 2024-06-01 RX ORDER — METOPROLOL TARTRATE 1 MG/ML
2.5 INJECTION, SOLUTION INTRAVENOUS EVERY 4 HOURS PRN
Status: DISCONTINUED | OUTPATIENT
Start: 2024-06-01 | End: 2024-06-11 | Stop reason: HOSPADM

## 2024-06-01 RX ORDER — HYDROMORPHONE HYDROCHLORIDE 1 MG/ML
0.3 INJECTION, SOLUTION INTRAMUSCULAR; INTRAVENOUS; SUBCUTANEOUS
Status: DISCONTINUED | OUTPATIENT
Start: 2024-06-01 | End: 2024-06-11 | Stop reason: HOSPADM

## 2024-06-01 RX ORDER — SIMETHICONE 40MG/0.6ML
40 SUSPENSION, DROPS(FINAL DOSAGE FORM)(ML) ORAL EVERY 6 HOURS PRN
Status: DISCONTINUED | OUTPATIENT
Start: 2024-06-01 | End: 2024-06-11 | Stop reason: HOSPADM

## 2024-06-01 RX ORDER — ACETAMINOPHEN 325 MG/1
975 TABLET ORAL EVERY 8 HOURS
Status: DISCONTINUED | OUTPATIENT
Start: 2024-06-01 | End: 2024-06-11 | Stop reason: HOSPADM

## 2024-06-01 RX ORDER — PIPERACILLIN SODIUM, TAZOBACTAM SODIUM 4; .5 G/20ML; G/20ML
4.5 INJECTION, POWDER, LYOPHILIZED, FOR SOLUTION INTRAVENOUS EVERY 6 HOURS
Status: COMPLETED | OUTPATIENT
Start: 2024-06-01 | End: 2024-06-03

## 2024-06-01 RX ORDER — OXYCODONE HYDROCHLORIDE 5 MG/1
5 TABLET ORAL EVERY 4 HOURS PRN
Status: DISCONTINUED | OUTPATIENT
Start: 2024-06-01 | End: 2024-06-01

## 2024-06-01 RX ADMIN — SODIUM CHLORIDE 1000 ML: 9 INJECTION, SOLUTION INTRAVENOUS at 16:37

## 2024-06-01 RX ADMIN — TAMSULOSIN HYDROCHLORIDE 0.4 MG: 0.4 CAPSULE ORAL at 22:09

## 2024-06-01 RX ADMIN — ACETAMINOPHEN 650 MG: 325 TABLET, FILM COATED ORAL at 00:02

## 2024-06-01 RX ADMIN — SODIUM CHLORIDE: 900 INJECTION INTRAVENOUS at 11:39

## 2024-06-01 RX ADMIN — PANTOPRAZOLE SODIUM 40 MG: 40 INJECTION, POWDER, FOR SOLUTION INTRAVENOUS at 11:36

## 2024-06-01 RX ADMIN — ACETAMINOPHEN 975 MG: 325 TABLET, FILM COATED ORAL at 17:57

## 2024-06-01 RX ADMIN — PIPERACILLIN AND TAZOBACTAM 4.5 G: 4; .5 INJECTION, POWDER, FOR SOLUTION INTRAVENOUS at 18:56

## 2024-06-01 RX ADMIN — HYDROMORPHONE HYDROCHLORIDE 0.2 MG: 0.2 INJECTION, SOLUTION INTRAMUSCULAR; INTRAVENOUS; SUBCUTANEOUS at 05:25

## 2024-06-01 RX ADMIN — HYDROMORPHONE HYDROCHLORIDE 0.2 MG: 0.2 INJECTION, SOLUTION INTRAMUSCULAR; INTRAVENOUS; SUBCUTANEOUS at 10:26

## 2024-06-01 RX ADMIN — SIMETHICONE 40 MG: 20 SUSPENSION/ DROPS ORAL at 12:22

## 2024-06-01 RX ADMIN — SODIUM CHLORIDE, POTASSIUM CHLORIDE, SODIUM LACTATE AND CALCIUM CHLORIDE 500 ML: 600; 310; 30; 20 INJECTION, SOLUTION INTRAVENOUS at 13:21

## 2024-06-01 RX ADMIN — SODIUM CHLORIDE: 900 INJECTION INTRAVENOUS at 18:57

## 2024-06-01 RX ADMIN — HYDROMORPHONE HYDROCHLORIDE 0.2 MG: 0.2 INJECTION, SOLUTION INTRAMUSCULAR; INTRAVENOUS; SUBCUTANEOUS at 03:22

## 2024-06-01 RX ADMIN — IOPAMIDOL 100 ML: 755 INJECTION, SOLUTION INTRAVENOUS at 17:18

## 2024-06-01 RX ADMIN — HYDROMORPHONE HYDROCHLORIDE 0.2 MG: 0.2 INJECTION, SOLUTION INTRAMUSCULAR; INTRAVENOUS; SUBCUTANEOUS at 16:19

## 2024-06-01 RX ADMIN — OXYCODONE HYDROCHLORIDE 5 MG: 5 TABLET ORAL at 07:06

## 2024-06-01 RX ADMIN — OXYCODONE HYDROCHLORIDE 5 MG: 5 TABLET ORAL at 12:22

## 2024-06-01 RX ADMIN — HYDROMORPHONE HYDROCHLORIDE 0.2 MG: 0.2 INJECTION, SOLUTION INTRAMUSCULAR; INTRAVENOUS; SUBCUTANEOUS at 00:40

## 2024-06-01 ASSESSMENT — ACTIVITIES OF DAILY LIVING (ADL)
ADLS_ACUITY_SCORE: 41
ADLS_ACUITY_SCORE: 47
ADLS_ACUITY_SCORE: 47
ADLS_ACUITY_SCORE: 43
ADLS_ACUITY_SCORE: 47
ADLS_ACUITY_SCORE: 44
ADLS_ACUITY_SCORE: 42
ADLS_ACUITY_SCORE: 43
ADLS_ACUITY_SCORE: 44
ADLS_ACUITY_SCORE: 45
ADLS_ACUITY_SCORE: 47
ADLS_ACUITY_SCORE: 41
ADLS_ACUITY_SCORE: 43
ADLS_ACUITY_SCORE: 44
ADLS_ACUITY_SCORE: 47
ADLS_ACUITY_SCORE: 43
ADLS_ACUITY_SCORE: 47
ADLS_ACUITY_SCORE: 47
ADLS_ACUITY_SCORE: 44
ADLS_ACUITY_SCORE: 47
ADLS_ACUITY_SCORE: 47
ADLS_ACUITY_SCORE: 42
ADLS_ACUITY_SCORE: 41

## 2024-06-01 NOTE — PROGRESS NOTES
Patient with new AGMA today.  Lactic acid checked and mildly elevated at 2.6.  Given 500 ml bolus.  Recheck even higher at 3.0.  HR mildly elevated in the setting of afib.  Blood pressure normal.  Afebrile.  Differential for lactic acidosis remains broad at this point but includes dehydration given NPO status, sepsis, bowel mal-perfusion in the setting of ileus, or other.      Clinically he appears uncomfortable.  Abdomen remains distended but abdominal exam is largely unchanged from this morning.  He is having dark green/blackish bilious output from NGT but in small quantity. HR increasing, BP increasing which is likely a combination of discomfort and underlying illness.  He remains afebrile and has not needed any supplemental o2.  Pain control has been difficult to manage throughout the day.  He was evaluated by the general surgery team who do no recommend surgical intervention at this time.      It is unclear what is causing the clinical worsening.  Obviously have to consider complication with regards to this patient's ileus.  Could also have some underlying infection masked by ileus.  He does have mildly elevated bilirubin and cholelithiasis on CT but there was no clear biliary obstruction or thickening of the gallbladder wall.  His surgical site appears well and he adamantly denies any back pain.  Hgb is stable so low concern for any active bleeding.      Plan:  -Will give sepsis fluid bolus (2 additional L NS)  -Recheck lactic acid following (~1900)  -Blood cultures, urine culture, repeat CMP, VBG  -STAT CT a/p to assess for any change given clinical worsening  -Would appreciate general surgery to reassess - have asked them to be paged  -Start empiric antibiotics with zosyn for intraabdominal coverage  -Evening doc updated and aware    Bienvenido Turner, DO    CT results show increasingly dilated cecum which is likely causing his worsened symptoms.  Discussed case with Dr. Coates who notes that this is overall  concerning for possible development of [place] syndrome.  He recommend I discuss case with GI for consideration of neostigmine or scope decompression.  Discussed case with Dr. Washburn of GI who recommends trial of tap water enema first.  If no effect and patient continues to worsen, should further consider neostigmine tonight.      Discussed the above plan with the [facility] hospitalist and the charge nurse.  For neostigmine, the patient would have to transfer to the ICU for close cardiac monitoring.      Bienvenido Turner, DO

## 2024-06-01 NOTE — PLAN OF CARE
"Pt is alert and oriented x 4. PRN Tylenol and dilaudid  x 3 oxycodone administered to manage pain. Pt denies any shortness of breath and on room air.     Abdomen distended. NPO maintained.     Dressing to back with scant old drainage. Pt not oob yet, declined. Back brace to be worn when out of bed. CMS is intact.    IV NS infusing at 75 ml/hr. Urinal at bedside. Ongoing monitoring.    Plan: NPO, IVF, pain mgt.    /88 (BP Location: Right arm)   Pulse 97   Temp 98  F (36.7  C) (Temporal)   Resp 16   Ht 1.778 m (5' 10\")   Wt 93 kg (205 lb 0.4 oz)   SpO2 97%   BMI 29.42 kg/m       Problem: Adult Inpatient Plan of Care  Goal: Plan of Care Review  Description: The Plan of Care Review/Shift note should be completed every shift.  The Outcome Evaluation is a brief statement about your assessment that the patient is improving, declining, or no change.  This information will be displayed automatically on your shift  note.  Outcome: Progressing  Flowsheets (Taken 6/1/2024 0504)  Outcome Evaluation: PRN Dilaudid administered to manage pain. Abdomen distended.  Plan of Care Reviewed With: patient  Overall Patient Progress: no change  Goal: Patient-Specific Goal (Individualized)  Description: You can add care plan individualizations to a care plan. Examples of Individualization might be:  \"Parent requests to be called daily at 9am for status\", \"I have a hard time hearing out of my right ear\", or \"Do not touch me to wake me up as it startles  me\".  Outcome: Progressing  Goal: Absence of Hospital-Acquired Illness or Injury  Outcome: Progressing  Intervention: Identify and Manage Fall Risk  Recent Flowsheet Documentation  Taken 6/1/2024 0400 by Atul Kim RN  Safety Promotion/Fall Prevention: safety round/check completed  Taken 6/1/2024 0300 by Atul Kim RN  Safety Promotion/Fall Prevention: safety round/check completed  Taken 6/1/2024 0200 by Atul Kim, RN  Safety Promotion/Fall Prevention: safety " round/check completed  Taken 6/1/2024 0100 by Atul Kim RN  Safety Promotion/Fall Prevention: safety round/check completed  Taken 6/1/2024 0001 by Atul Kim RN  Safety Promotion/Fall Prevention:   safety round/check completed   nonskid shoes/slippers when out of bed   mobility aid in reach   lighting adjusted   assistive device/personal items within reach   activity supervised  Taken 5/31/2024 2300 by Atul Kim RN  Safety Promotion/Fall Prevention: safety round/check completed  Intervention: Prevent Skin Injury  Recent Flowsheet Documentation  Taken 6/1/2024 0001 by Atul Kim RN  Body Position: position changed independently  Goal: Optimal Comfort and Wellbeing  Outcome: Progressing  Intervention: Monitor Pain and Promote Comfort  Recent Flowsheet Documentation  Taken 6/1/2024 0001 by Atul Kim RN  Pain Management Interventions: medication (see MAR)  Goal: Readiness for Transition of Care  Outcome: Progressing     Problem: Intestinal Obstruction  Goal: Optimal Bowel Function  Outcome: Progressing  Intervention: Promote Bowel Function  Recent Flowsheet Documentation  Taken 6/1/2024 0001 by Atul Kim RN  Body Position: position changed independently  Head of Bed (HOB) Positioning: HOB at 30-45 degrees  Positioning/Transfer Devices:   pillows   in use  Goal: Fluid and Electrolyte Balance  Outcome: Progressing  Goal: Absence of Infection Signs and Symptoms  Outcome: Progressing  Goal: Optimize Nutrition Status  Outcome: Progressing  Goal: Optimal Pain Control and Function  Outcome: Progressing  Intervention: Prevent or Manage Pain  Recent Flowsheet Documentation  Taken 6/1/2024 0001 by Atul Kim RN  Pain Management Interventions: medication (see MAR)   Goal Outcome Evaluation:      Plan of Care Reviewed With: patient    Overall Patient Progress: no changeOverall Patient Progress: no change    Outcome Evaluation: PRN Dilaudid administered to manage pain. Abdomen  distended.

## 2024-06-01 NOTE — CONSULTS
Care Management Initial Consult    General Information  Assessment completed with: Patient, Children, Spouse or significant other, Lamont Morgan Patty  Type of CM/SW Visit: Initial Assessment    Primary Care Provider verified and updated as needed: Yes (Dr. Doug Rodriguez, [facility])   Readmission within the last 30 days: no previous admission in last 30 days      Reason for Consult: discharge planning  Advance Care Planning:            Communication Assessment  Patient's communication style: spoken language (English or Bilingual)             Cognitive  Cognitive/Neuro/Behavioral: WDL                      Living Environment:   People in home: significant other (Eddie lives with his partner, Cony, in [place] but is currently staying with his son, Lamont in [place].)     Current living Arrangements: house      Able to return to prior arrangements:         Family/Social Support:  Care provided by: self  Provides care for: no one  Marital Status: Lives with Significant Other  Partner, Hubert Donnelly     Description of Support System:           Current Resources:   Patient receiving home care services:       Community Resources:    Equipment currently used at home: cane, walker, rolling (family reports prior to surgery he rarely used the walker)   Supplies currently used at home:      Employment/Financial:  Employment Status:          Financial Concerns:             Does the patient's insurance plan have a 3 day qualifying hospital stay waiver?  No    Lifestyle & Psychosocial Needs:  Social Determinants of Health     Food Insecurity: No Food Insecurity (5/28/2024)    Received from [email or web address] [facility]    Food Insecurity     Worried About Running Out of Food in the Last Year: 1   Depression: Not at risk (5/31/2022)    [facility]    PHQ-2     PHQ-9 Total (Adult): 0   Housing Stability: Low Risk  (5/28/2024)    Received from [employer] [facility]    Housing Stability     Unable to Pay for Housing in the Last Year: 1   Tobacco Use: Low Risk  (5/24/2024)    Received from [employer] [facility]    Patient History     Smoking Tobacco Use: Never     Smokeless Tobacco Use: Never     Passive Exposure: Not on file   Financial Resource Strain: Low Risk  (5/28/2024)    Received from [employer] [facility]    Financial Resource Strain     Difficulty of Paying Living Expenses: 3     Difficulty of Paying Living Expenses: Not on file   Alcohol Use: Unknown (2/4/2020)    Received from [facility]    AUDIT-C     Frequency of Alcohol Consumption: Monthly or less     Average Number of Drinks: Not on file     Frequency of Binge Drinking: Not on file   Transportation Needs: No Transportation Needs (5/28/2024)    Received from [employer] [facility]    Transportation Needs     Lack of Transportation (Medical): 1   Physical Activity: Insufficiently Active (4/3/2024)    [facility] from [facility]    Exercise Vital Sign     Days of Exercise per Week: 4 days     Minutes of Exercise per Session: 20 min   Interpersonal Safety: Not on file   Stress: Not on file   Social Connections: Socially Integrated (5/28/2024)    Received from [employer] [facility]    Social Connections     Frequency of Communication with Friends and Family: 0   Health Literacy: Not on file       Functional Status:  Prior to admission patient needed assistance:              Mental Health Status:          Chemical Dependency Status:                Values/Beliefs:  Spiritual, Cultural Beliefs, Orthodoxy Practices, Values that affect care:                 Additional Information:    Eddie is an 84-year-old male who presented to the emergency department for evaluation of constipation following a spinal fusion at [facility] on 5/28. He was  discharged from the hospital yesterday.      [facility] was consulted for discharge planning. SW met with Eddie, his son Lamont and partner Cony at bedside. Patient requested his son do most of the talking, stating he wasn't feeling well. Patient is currently staying with son, in [place] but lives in [place] with his partner, Cony. Lamont said that Eddie is welcome to stay with him as long as he needs as he recovers and will have constant supervision. Patient will have assistance of partner, Cony, upon return to [place].     Current PT recommendations are home with assist. Patient uses a cane at baseline and the family got him a walker after his recent surgery.     Patient's family is available to provide transportation at discharge. SW is available if additional discharge needs arise.     LEON Brush, [facility]   Care Coordinator-Casual  [email or web address]

## 2024-06-01 NOTE — PROGRESS NOTES
Surgery-[place]  Asked to re-assess this 83yo gentleman whom I met this AM for rising lactate and continued abdominal discomfort. He looks and feels about the same as earlier today; his CT is notable for an interval increase in the diameter of his cecum without mechanical obstruction downstream. This seems most consistent with [place] syndrome. Current cecal diameter is 9.5cm (previously on the order of 7). I've recommended to Dr Turner that he consult with GI to see if neostigmine decompression is warranted (not obvious at present but may need to consider overnight if he clinically worsens).

## 2024-06-01 NOTE — PROGRESS NOTES
[facility]    Medicine Progress Note - Hospitalist Service    Date of Admission:  5/31/2024    Assessment & Plan   Eddie Peralta is a 84 year old male with a history of Cataract, Arthritis, Atrial Fibrillation, GERD, HLD, BPH, DDD, Spinal Stenosis who presents to the emergency department for evaluation of constipation and abdominal pain following a spinal fusion of L4 and L5 on 5/28 at [facility].       Post Op Ileus vs early SBO, worsening:  Pt presents with acute onset of abdominal pain, distension and nausea.  Last BM on 5/29.  Using Oxycodone for pain control after his spine surgery on 5/28.  Pt is afebrile, wbc 11.9 and CT abd/pelvis reveals a mildly dilated distal ileal small bowel loops and ascending colon with tapering to nondilated transverse/distal colon consistent with ileus. No discrete transition point.  Abd XR on AM 6/1 shows worsening.  Patient also with increased nausea, abdominal distention and discomfort.  Small BM.  Most likely post-op ileus.   -General surgery consult, appreciate recs  -NPO, IVF, antiemetics, analgesics prn; try to avoid narcotics  -Monitor serial abdominal exams  -Consideration for gastrografin challenge  -Patient's pain inadequately controlled so I have increased dilaudid dose and oxycodone dosing - narcotics are not idea for this situation but also need to control the patients pain  -Tylenol ordered  -Could consider ketorolac although NSAIDS not ideal in the setting of anticoagulation, recent surgery, and abdominal pain   -Continue pantoprazole     Lumbar Spinal Stenosis  S/p Transfacet/Transforaminal L4 to: L5 Posterior Spine Fusion with Instrumentation L4 to: L5 Transforaminal Lumbar Interbody Fusion L4 to: L5 on 5/28/24 at [facility]   Surgical incision examined by ED physician with no signs of infection.  -Continue brace and post op lifting and bending restrictions  -PT/SW consult     Chronic Atrial Fibrillation  Diagnosed 2022 s/p Cardioversion 7/2022 with  "recurrence shortly after.  No hx of obstructive CAD or CHF per last Cardiology note 5/2023.  Has a prescriptions for Lasix prn for edema.  -Per family, Heidy was planned to be resumed 6/1/24 per surgery  -Hold given lower chadsvasc (2 - only for age), and potential need for surgery if he fails to improve     HLD  - resume pta Atorvastatin when taking po well     GERD  Hx Conti's Esophagus  - hold pta Omeprazole and start IV Protonix     BPH  - continue Flomax and monitor PVR          Diet: NPO for Medical/Clinical Reasons Except for: Meds, Ice Chips    DVT Prophylaxis: Heparin SQ  Tse Catheter: Not present  Lines: None     Cardiac Monitoring: None  Code Status: Full Code      Clinically Significant Risk Factors          # Hypocalcemia: Lowest Ca = 8.2 mg/dL in last 2 days, will monitor and replace as appropriate     # Hypoalbuminemia: Lowest albumin = 3.3 g/dL at 6/1/2024 10:30 AM, will monitor as appropriate            # Overweight: Estimated body mass index is 29.42 kg/m  as calculated from the following:    Height as of this encounter: 1.778 m (5' 10\").    Weight as of this encounter: 93 kg (205 lb 0.4 oz)., PRESENT ON ADMISSION            Disposition Plan     Medically Ready for Discharge: Anticipated in 2-4 Days        Bienvenido Turner DO  Hospitalist Service  [facility]  Securely message with [employer] (more info)  Text page via [email or web address] Paging/Directory   ______________________________________________________________________    Interval History   Worsened abdominal pain and abdominal distention overnight.  No fevers or chills.  No vomiting but significant nausea.  Family at bedside.  Their questions answered.     Physical Exam   Vital Signs: Temp: 98.3  F (36.8  C) Temp src: Temporal BP: (!) 161/103 Pulse: 104   Resp: 16 SpO2: 97 % O2 Device: None (Room air)    Weight: 205 lbs .44 oz    General Appearance: Patient awake & alert.  Uncomfortable appearing.   Respiratory: Lungs are CTAB.  " Work of breathing appears normal on room air.  Cardiovascular: Irregular rhythm, good rate control.  No murmurs rubs or gallops.  There is trace edema present.  GI: Moderate abdominal distention, active BS, diffuse TTP.  No rebound tenderness or guarding.   Skin: No rashes or lesions exposed skin.  Neuro:  The patient is moving all extremities.  No obvious focal asymmetries.   Other: Patient is appropriate and oriented x3.     Medical Decision Making       50 MINUTES SPENT BY ME on the date of service doing chart review, history, exam, documentation & further activities per the note.      Data   ------------------------- PAST 24 HR DATA REVIEWED -----------------------------------------------    I have personally reviewed the following data over the past 24 hrs:    13.3 (H)  \   15.3   / 167     138 103 22.4 /  137 (H)   4.3 19 (L) 0.77 \     ALT: 9 AST: 17 AP: 77 TBILI: 1.4 (H)   ALB: 3.3 (L) TOT PROTEIN: 6.4 LIPASE: N/A     Procal: N/A CRP: N/A Lactic Acid: 2.6 (H)         Imaging results reviewed over the past 24 hrs:   Recent Results (from the past 24 hour(s))   XR Abdomen 2 Views    Narrative    EXAM: XR ABDOMEN 2 VIEWS  LOCATION: [facility]  DATE: 06/01/2024    INDICATION: Follow-up Ileus. See on CT scan from 05/31/2024.  COMPARISON: CT 05/31/2024.      Impression    IMPRESSION: Interval significant increase of dilated multiple small bowel loops and continued diffuse dilatation of the colon. This is worrisome for progression of diffuse ileus. No free air identified. Spine fusion changes at the lower lumbar level.     XR Abdomen Port 1 View    Narrative    EXAM: XR ABDOMEN PORT 1 VIEW  LOCATION: [facility]  DATE: 6/1/2024    INDICATION: assess NGT placement  COMPARISON: Abdominal radiograph earlier the same day and CT abdomen/pelvis 5/31/2024      Impression    IMPRESSION: Gastric tube has been placed with tip and sidehole overlying the stomach. Similar gaseous  dilatation of the visualized large and small bowel loops. No pneumoperitoneum. Partially visualized lumbar fusion hardware.

## 2024-06-01 NOTE — CONSULTS
General Surgery Consultation    Eddie Peralta MRN# [id number]   Age: 84 year old YOB: 1940     Date of Admission:  5/31/2024    Reason for consult:            Ileus following recent spine surgery.       Requesting physician:            Dr Turner                Assessment and Plan:   Assessment:   Eddie Peralta is a 84 year old male with generalized abdominal pain, most notably in the lower quadrants.    Comorbidities:  spinal stenosis s/p recent lumbar fusion, atrial fibrillation, currently holding anticoagulation.      Plan:   NGT decompression, hydration and bowel rest. Asked that he avoid narcotics and consider suppository (possibly APAP).  Walk as much as able.  If not making notable improvement tomorrow, consider gastrografin challenge.  Despite moderate discomfort, no role for surgery which indeed would likely make his situation clinically worse.                 Chief Complaint:   Diffuse abdominal pain.     History is obtained from the patient and his son.         History of Present Illness:   Eddie Peralta is a 84 year old male who presents with generalized abdominal pain for 1 day with associated bloating and nausea. He recently underwent a lumbar fusion on 5/28/24 and was discharged two days later ([facility]).  The pain is constant and dull and cramping.  He has had a prior open appendectomy as a young adult. He had an initial CT which suggested and ileus and had increased small bowel dilation on an X ray this AM. He did not get much relief after NG placement but felt a little better after a recent walk. He had a small bowel movement this morning but no flatus since admission.             Past Medical History:    has no past medical history on file.          Past Surgical History:   No past surgical history on file.          Social History:     Social History     Tobacco Use    Smoking status: Not on file    Smokeless tobacco: Not on file   Substance Use Topics    Alcohol use: Not  on file             Family History:   Family history reviewed and not pertinent.         Allergies:   No Known Allergies          Medications:     Current Facility-Administered Medications   Medication Dose Route Frequency Provider Last Rate Last Admin    acetaminophen (TYLENOL) tablet 650 mg  650 mg Oral Q4H PRN Poonam Ramsey PA-C   650 mg at 06/01/24 0002    HYDROmorphone (PF) (DILAUDID) injection 0.3 mg  0.3 mg Intravenous Q2H PRN Bienvenido Turner DO        lidocaine (LMX4) cream   Topical Q1H PRN Poonam Ramsey PA-C        lidocaine 1 % 0.1-1 mL  0.1-1 mL Other Q1H PRN Poonam Ramsey PA-C        naloxone (NARCAN) injection 0.2 mg  0.2 mg Intravenous Q2 Min PRN Bienvenido Turner DO        Or    naloxone (NARCAN) injection 0.4 mg  0.4 mg Intravenous Q2 Min PRN Bienvenido Turner DO        Or    naloxone (NARCAN) injection 0.2 mg  0.2 mg Intramuscular Q2 Min PRN Bienvenido Turner DO        Or    naloxone (NARCAN) injection 0.4 mg  0.4 mg Intramuscular Q2 Min PRN Bienvenido Turner DO        ondansetron (ZOFRAN ODT) ODT tab 4 mg  4 mg Oral Q6H PRN Poonam Ramsey PA-C        Or    ondansetron (ZOFRAN) injection 4 mg  4 mg Intravenous Q6H PRN Poonam Ramsey PA-C        oxyCODONE (ROXICODONE) tablet 5 mg  5 mg Oral Q4H PRN Bienvenido Turner DO   5 mg at 06/01/24 1222    pantoprazole (PROTONIX) IV push injection 40 mg  40 mg Intravenous Q24H Poonam Ramsey PA-C   40 mg at 06/01/24 1136    simethicone (MYLICON) suspension 40 mg  40 mg Oral Q6H PRN Bienvenido Turner DO   40 mg at 06/01/24 1222    sodium chloride (PF) 0.9% PF flush 3 mL  3 mL Intracatheter Q8H Poonam Ramsey PA-C   3 mL at 05/31/24 1202    sodium chloride (PF) 0.9% PF flush 3 mL  3 mL Intracatheter q1 min prn Poonam Ramsey PA-C        sodium chloride 0.9 % infusion   Intravenous Continuous Poonam Ramsey PA-C 75 mL/hr at 06/01/24 1139 New Bag at 06/01/24 1139    tamsulosin (FLOMAX) capsule 0.4 mg  0.4 mg Oral At Bedtime  "Poonam Ramsey PA-C   0.4 mg at 05/31/24 2105     Current Facility-Administered Medications   Medication Dose Route Frequency Provider Last Rate Last Admin    pantoprazole (PROTONIX) IV push injection 40 mg  40 mg Intravenous Q24H Poonam Ramsey PA-C   40 mg at 06/01/24 1136    sodium chloride (PF) 0.9% PF flush 3 mL  3 mL Intracatheter Q8H Poonam Ramsey PA-C   3 mL at 05/31/24 1202    tamsulosin (FLOMAX) capsule 0.4 mg  0.4 mg Oral At Bedtime Poonam Ramsey PA-C   0.4 mg at 05/31/24 2105            Review of Systems:   The Review of Systems is negative other than noted in the HPI.          Physical Exam:   BP (!) 161/103   Pulse 104   Temp 98.3  F (36.8  C) (Temporal)   Resp 16   Ht 1.778 m (5' 10\")   Wt 93 kg (205 lb 0.4 oz)   SpO2 97%   BMI 29.42 kg/m    General - Well developed, well nourished male in no apparent distress  HEENT:  Head normocephalic and atraumatic, pupils equal and round, conjunctivae clear, no scleral icterus, mucous membranes moist, external ears and nose normal  Neck: Supple without thyromegaly or masses  Lymphatic: No cervical, or supraclavicular lymphadenopathy  Lungs: Clear to auscultation bilaterally  Abdomen:   firm, rounded, distended with mild tenderness noted diffusely but no focal peritonitis. N rebound or guarding. no masses palpated. Absent bowel tones.  Extremities: Warm without edema  Neurologic: alert, speech is clear, moves all extremities with good strength  Psychiatric: Mood and affect appropriate  Skin: Without lesions, rashes, or juandice         Data:     Lab Results   Component Value Date    WBC 13.3 06/01/2024     Lab Results   Component Value Date    HGB 15.3 06/01/2024     Lab Results   Component Value Date     06/01/2024     Last Basic Metabolic Panel:  Lab Results   Component Value Date     06/01/2024      Lab Results   Component Value Date    POTASSIUM 4.3 06/01/2024     Lab Results   Component Value Date    CHLORIDE 103 " 06/01/2024     Lab Results   Component Value Date    REN 8.4 06/01/2024     Lab Results   Component Value Date    CO2 19 06/01/2024     Lab Results   Component Value Date    BUN 22.4 06/01/2024     Lab Results   Component Value Date    CR 0.77 06/01/2024     Lab Results   Component Value Date     06/01/2024       Liver Function Studies -   Recent Labs   Lab Test 06/01/24  1030   PROTTOTAL 6.4   ALBUMIN 3.3*   BILITOTAL 1.4*   ALKPHOS 77   AST 17   ALT 9       Imaging:  All imaging studies reviewed by me.    Results for orders placed or performed during the hospital encounter of 05/31/24   CT Abdomen Pelvis w Contrast    Narrative    CT ABDOMEN/PELVIS WITH CONTRAST May 31, 2024 9:06 AM    CLINICAL HISTORY: Abdominal pain and distention.    TECHNIQUE: CT scan of the abdomen and pelvis was performed following  injection of IV contrast. Multiplanar reformats were obtained. Dose  reduction techniques were used.  CONTRAST: 100mL Isovue-370.    COMPARISON: None.    FINDINGS:   LOWER CHEST: Trace bibasilar atelectasis. Tiny hiatal hernia.    HEPATOBILIARY: Cholelithiasis. Left hepatic lobe subcentimeter  hypodensity is too small to characterize, although most likely benign.    PANCREAS: No significant mass, duct dilatation, or inflammatory  change.    SPLEEN: Unremarkable.    ADRENAL GLANDS: No significant nodules.    KIDNEYS: No significant mass, stones, or hydronephrosis.    BOWEL: Mildly distended ascending colon (up to 7 cm) and distal ileal  small bowel loops (up to 3.5 cm) with tapering to nondilated  transverse/distal colon. No discrete transition point. No pneumatosis.  No significant wall thickening.    VASCULATURE: Mild atherosclerosis. Aortic branch which vessels are  grossly patent.    LYMPH NODE AND PERITONEUM: No enlarged lymph node. Trace  abdominopelvic ascites. No free air.    PELVIS: No pelvic mass.    MUSCULOSKELETAL: Tiny fat-containing umbilical hernia. Lower lumbar  spine fusion hardware. No  destructive osseous lesion.    OTHER: None.      Impression    IMPRESSION:   1.  Mildly dilated distal ileal small bowel loops and ascending colon  with tapering to nondilated transverse/distal colon consistent with  ileus. No discrete transition point.  2.  Trace abdominopelvic ascites.  3.  Cholelithiasis.    CORRIE SANDHU MD         SYSTEM ID:  [id number]   XR Abdomen 2 Views    Narrative    EXAM: XR ABDOMEN 2 VIEWS  LOCATION: [facility]  DATE: 06/01/2024    INDICATION: Follow-up Ileus. See on CT scan from 05/31/2024.  COMPARISON: CT 05/31/2024.      Impression    IMPRESSION: Interval significant increase of dilated multiple small bowel loops and continued diffuse dilatation of the colon. This is worrisome for progression of diffuse ileus. No free air identified. Spine fusion changes at the lower lumbar level.     XR Abdomen Port 1 View    Narrative    EXAM: XR ABDOMEN PORT 1 VIEW  LOCATION: [facility]  DATE: 6/1/2024    INDICATION: assess NGT placement  COMPARISON: Abdominal radiograph earlier the same day and CT abdomen/pelvis 5/31/2024      Impression    IMPRESSION: Gastric tube has been placed with tip and sidehole overlying the stomach. Similar gaseous dilatation of the visualized large and small bowel loops. No pneumoperitoneum. Partially visualized lumbar fusion hardware.         John Bah MD

## 2024-06-01 NOTE — PLAN OF CARE
"Goal Outcome Evaluation:      Plan of Care Reviewed With: patient, child, family          Outcome Evaluation: BP/HR slightly elevated otherwise vitally stable. Pt endorsing ongoing abdominal pain - only receiving mild relief with PRN medications - trying to avoid to promote bowel activity. Up with assistance of 2 and walker. Abdomen remains significantly distended. NG placement successful with little output noted. Pt ambulating x2 - did have 2x loose bowel movements. Spine incisional dressing removed per pt family request - site CDI. Plan to remain NPO, IVF, promote ambulation, pain management. Family at bedside.      Problem: Adult Inpatient Plan of Care  Goal: Plan of Care Review  Description: The Plan of Care Review/Shift note should be completed every shift.  The Outcome Evaluation is a brief statement about your assessment that the patient is improving, declining, or no change.  This information will be displayed automatically on your shift  note.  Outcome: Progressing  Flowsheets (Taken 6/1/2024 1433)  Outcome Evaluation: BP/HR slightly elevated otherwise vitally stable. Pt endorsing ongoing abdominal pain - only receiving mild relief with PRN medications - trying to avoid to promote bowel activity. Up with assistance of 2 and walker. Abdomen remains significantly distended. NG placement successful with little output noted. Pt ambulating x2 - did have 2x loose bowel movements. Spine incisional dressing removed per pt family request - site CDI. Plan to remain NPO, IVF, promote ambulation, pain management. Family at bedside.  Plan of Care Reviewed With:   patient   child   family  Goal: Patient-Specific Goal (Individualized)  Description: You can add care plan individualizations to a care plan. Examples of Individualization might be:  \"Parent requests to be called daily at 9am for status\", \"I have a hard time hearing out of my right ear\", or \"Do not touch me to wake me up as it startles  me\".  Outcome: " Progressing  Goal: Absence of Hospital-Acquired Illness or Injury  Outcome: Progressing  Intervention: Identify and Manage Fall Risk  Recent Flowsheet Documentation  Taken 6/1/2024 0900 by Walker Andrews RN  Safety Promotion/Fall Prevention:   activity supervised   safety round/check completed  Intervention: Prevent Skin Injury  Recent Flowsheet Documentation  Taken 6/1/2024 0900 by Walker Andrews RN  Device Skin Pressure Protection: absorbent pad utilized/changed  Goal: Optimal Comfort and Wellbeing  Outcome: Progressing  Intervention: Monitor Pain and Promote Comfort  Recent Flowsheet Documentation  Taken 6/1/2024 1226 by Walker Andrews RN  Pain Management Interventions: medication (see MAR)  Taken 6/1/2024 1026 by Walker Andrews RN  Pain Management Interventions: medication (see MAR)  Taken 6/1/2024 0758 by Walker Andrews RN  Pain Management Interventions: declines  Goal: Readiness for Transition of Care  Outcome: Progressing

## 2024-06-01 NOTE — PROGRESS NOTES
06/01/24 0843   Appointment Info   Signing Clinician's Name / Credentials (PT) Ashli Smith DPT   Living Environment   Living Environment Comments Pt lives out of town, but is staying locally for several weeks with family. Family's home has 2 steps to enter and pt can stay on main level. Family is present to assist patient as needed during his recovery phase.   Self-Care   Usual Activity Tolerance good   Current Activity Tolerance moderate   Regular Exercise No   Equipment Currently Used at Home walker, rolling   Fall history within last six months no   Activity/Exercise/Self-Care Comment pt has been using FWW since spine surgery   General Information   Onset of Illness/Injury or Date of Surgery 05/31/24   Referring Physician Poonam Ramsey PA-C   Patient/Family Therapy Goals Statement (PT) Return back to family's home once stomach is feeling better   Pertinent History of Current Problem (include personal factors and/or comorbidities that impact the POC) Eddie Peralta is a 84 year old male with a history of Cataract, Arthritis, Atrial Fibrillation, GERD, HLD, BPH, DDD, Spinal Stenosis who presents to the emergency department for evaluation of constipation and abdominal pain following a spinal fusion of L4 and L5 on 5/28 at [facility]. Pt found to have post-op ileus   Existing Precautions/Restrictions fall;spinal   General Observations Pt clearly distressed by abdominal discomfort, but agreeable despite this   Cognition   Affect/Mental Status (Cognition) WFL   Orientation Status (Cognition) oriented x 4   Follows Commands (Cognition) WFL   Pain Assessment   Patient Currently in Pain Yes, see Vital Sign flowsheet   Integumentary/Edema   Integumentary/Edema Comments Surgical dressing intact with small amounts of drainage.   Posture    Posture Forward head position;Protracted shoulders   Range of Motion (ROM)   Range of Motion ROM is WFL   Strength (Manual Muscle Testing)   Strength (Manual Muscle Testing) Deficits  observed during functional mobility   Strength Comments Requiring UE support for transfers   Bed Mobility   Comment, (Bed Mobility) sit<>supine with Lakeisha at B LE   Transfers   Comment, (Transfers) sit<>stand with Lakeisha   Gait/Stairs (Locomotion)   Comment, (Gait/Stairs) Lakeisha with FWW x 3'   Balance   Balance Comments Requires B UE support for safe dynamic mobility   Sensory Examination   Sensory Perception patient reports no sensory changes   Coordination   Coordination no deficits were identified   Muscle Tone   Muscle Tone no deficits were identified   Clinical Impression   Criteria for Skilled Therapeutic Intervention Yes, treatment indicated   PT Diagnosis (PT) Impaired functional mobility   Influenced by the following impairments abdominal discomfort, post-op weakness, spinal precautions   Functional limitations due to impairments difficulty with bed mobility, transfers, ambulation, stairs   Clinical Presentation (PT Evaluation Complexity) stable   Clinical Presentation Rationale medically progressing, clear PT POC   Clinical Decision Making (Complexity) low complexity   Planned Therapy Interventions (PT) balance training;bed mobility training;gait training;home exercise program;patient/family education;stair training;strengthening;stretching;transfer training;progressive activity/exercise;home program guidelines   Risk & Benefits of therapy have been explained evaluation/treatment results reviewed;care plan/treatment goals reviewed;risks/benefits reviewed;current/potential barriers reviewed;participants voiced agreement with care plan;participants included;patient;spouse/significant other;son   PT Total Evaluation Time   PT Eval, Low Complexity Minutes (71851) 12   Physical Therapy Goals   PT Frequency Daily   PT Predicted Duration/Target Date for Goal Attainment 06/06/24   PT Goals Bed Mobility;Transfers;Gait;Stairs   PT: Bed Mobility Supervision/stand-by assist;Supine to/from sit;Within precautions  (spinal  precautions)   PT: Transfers Supervision/stand-by assist;Sit to/from stand;Within precautions  (spinal precautions)   PT: Gait Supervision/stand-by assist;Rolling walker;100 feet;Within precautions  (spinal precautions)   PT: Stairs Minimal assist;2 stairs;Within precautions;Assistive device  (spinal precautions)   PT Discharge Planning   PT Discharge Recommendation (DC Rec) home with assist   PT Rationale for DC Rec Anticipate that with resolution of abdominal pain and continued IP PT the patient will be able to safely discharge to his family's home with assist for stairs and IADLs from family. Pt and family in agreement with this.   PT Brief overview of current status Ax1 WW, limited by abdominal pain   Total Session Time   Total Session Time (sum of timed and untimed services) 12

## 2024-06-01 NOTE — PLAN OF CARE
Goal Outcome Evaluation:      Plan of Care Reviewed With: patient    Overall Patient Progress: no changeOverall Patient Progress: no change    Patient A&Ox4, VSS. Pain managed with IV dilaudid, is NPO. IV infusing NS at 75ml/hr, voiding in the urinal. Bowel sounds audible, denies passing gas but is burping. Did not get out of bed encouraged pt to walk but he declined. Dressing on back with scant amount of drainage.  Problem: Adult Inpatient Plan of Care  Goal: Plan of Care Review  Description: The Plan of Care Review/Shift note should be completed every shift.  The Outcome Evaluation is a brief statement about your assessment that the patient is improving, declining, or no change.  This information will be displayed automatically on your shift  note.  Outcome: Not Progressing  Flowsheets (Taken 5/31/2024 2258)  Plan of Care Reviewed With: patient  Overall Patient Progress: no change     Problem: Intestinal Obstruction  Goal: Optimal Bowel Function  Intervention: Promote Bowel Function  Recent Flowsheet Documentation  Taken 5/31/2024 1705 by Janet Stanley RN  Body Position: supine, head elevated  Head of Bed (HOB) Positioning: HOB at 30-45 degrees  Positioning/Transfer Devices:   pillows   in use

## 2024-06-02 ENCOUNTER — APPOINTMENT (OUTPATIENT)
Dept: PHYSICAL THERAPY | Facility: CLINIC | Age: 84
DRG: 394 | End: 2024-06-02
Payer: MEDICARE

## 2024-06-02 ENCOUNTER — APPOINTMENT (OUTPATIENT)
Dept: GENERAL RADIOLOGY | Facility: CLINIC | Age: 84
DRG: 394 | End: 2024-06-02
Attending: STUDENT IN AN ORGANIZED HEALTH CARE EDUCATION/TRAINING PROGRAM
Payer: MEDICARE

## 2024-06-02 LAB
ALBUMIN SERPL BCG-MCNC: 3.2 G/DL (ref 3.5–5.2)
ALP SERPL-CCNC: 69 U/L (ref 40–150)
ALT SERPL W P-5'-P-CCNC: 12 U/L (ref 0–70)
ANION GAP SERPL CALCULATED.3IONS-SCNC: 13 MMOL/L (ref 7–15)
AST SERPL W P-5'-P-CCNC: 16 U/L (ref 0–45)
BILIRUB SERPL-MCNC: 1.4 MG/DL
BUN SERPL-MCNC: 26.3 MG/DL (ref 8–23)
CALCIUM SERPL-MCNC: 8.2 MG/DL (ref 8.8–10.2)
CHLORIDE SERPL-SCNC: 106 MMOL/L (ref 98–107)
CREAT SERPL-MCNC: 0.7 MG/DL (ref 0.67–1.17)
DEPRECATED HCO3 PLAS-SCNC: 22 MMOL/L (ref 22–29)
EGFRCR SERPLBLD CKD-EPI 2021: >90 ML/MIN/1.73M2
ERYTHROCYTE [DISTWIDTH] IN BLOOD BY AUTOMATED COUNT: 12.3 % (ref 10–15)
GLUCOSE SERPL-MCNC: 140 MG/DL (ref 70–99)
HCT VFR BLD AUTO: 42.2 % (ref 40–53)
HGB BLD-MCNC: 14.6 G/DL (ref 13.3–17.7)
LACTATE SERPL-SCNC: 1.7 MMOL/L (ref 0.7–2)
MAGNESIUM SERPL-MCNC: 2 MG/DL (ref 1.7–2.3)
MCH RBC QN AUTO: 32.2 PG (ref 26.5–33)
MCHC RBC AUTO-ENTMCNC: 34.6 G/DL (ref 31.5–36.5)
MCV RBC AUTO: 93 FL (ref 78–100)
PHOSPHATE SERPL-MCNC: 3.2 MG/DL (ref 2.5–4.5)
PLATELET # BLD AUTO: 195 10E3/UL (ref 150–450)
POTASSIUM SERPL-SCNC: 3.8 MMOL/L (ref 3.4–5.3)
PROT SERPL-MCNC: 6.1 G/DL (ref 6.4–8.3)
RBC # BLD AUTO: 4.54 10E6/UL (ref 4.4–5.9)
SODIUM SERPL-SCNC: 141 MMOL/L (ref 135–145)
WBC # BLD AUTO: 13.4 10E3/UL (ref 4–11)

## 2024-06-02 PROCEDURE — 250N000011 HC RX IP 250 OP 636: Performed by: PHYSICIAN ASSISTANT

## 2024-06-02 PROCEDURE — 250N000013 HC RX MED GY IP 250 OP 250 PS 637: Performed by: STUDENT IN AN ORGANIZED HEALTH CARE EDUCATION/TRAINING PROGRAM

## 2024-06-02 PROCEDURE — 97116 GAIT TRAINING THERAPY: CPT | Mod: GP

## 2024-06-02 PROCEDURE — 83605 ASSAY OF LACTIC ACID: CPT | Performed by: STUDENT IN AN ORGANIZED HEALTH CARE EDUCATION/TRAINING PROGRAM

## 2024-06-02 PROCEDURE — C9113 INJ PANTOPRAZOLE SODIUM, VIA: HCPCS | Performed by: STUDENT IN AN ORGANIZED HEALTH CARE EDUCATION/TRAINING PROGRAM

## 2024-06-02 PROCEDURE — 85027 COMPLETE CBC AUTOMATED: CPT | Performed by: STUDENT IN AN ORGANIZED HEALTH CARE EDUCATION/TRAINING PROGRAM

## 2024-06-02 PROCEDURE — 99231 SBSQ HOSP IP/OBS SF/LOW 25: CPT | Performed by: PHYSICIAN ASSISTANT

## 2024-06-02 PROCEDURE — 258N000003 HC RX IP 258 OP 636: Performed by: STUDENT IN AN ORGANIZED HEALTH CARE EDUCATION/TRAINING PROGRAM

## 2024-06-02 PROCEDURE — 93010 ELECTROCARDIOGRAM REPORT: CPT | Performed by: INTERNAL MEDICINE

## 2024-06-02 PROCEDURE — 99233 SBSQ HOSP IP/OBS HIGH 50: CPT | Performed by: STUDENT IN AN ORGANIZED HEALTH CARE EDUCATION/TRAINING PROGRAM

## 2024-06-02 PROCEDURE — 84100 ASSAY OF PHOSPHORUS: CPT | Performed by: STUDENT IN AN ORGANIZED HEALTH CARE EDUCATION/TRAINING PROGRAM

## 2024-06-02 PROCEDURE — 250N000009 HC RX 250: Performed by: STUDENT IN AN ORGANIZED HEALTH CARE EDUCATION/TRAINING PROGRAM

## 2024-06-02 PROCEDURE — 250N000013 HC RX MED GY IP 250 OP 250 PS 637: Performed by: PHYSICIAN ASSISTANT

## 2024-06-02 PROCEDURE — 74018 RADEX ABDOMEN 1 VIEW: CPT

## 2024-06-02 PROCEDURE — 120N000001 HC R&B MED SURG/OB

## 2024-06-02 PROCEDURE — 83735 ASSAY OF MAGNESIUM: CPT | Performed by: STUDENT IN AN ORGANIZED HEALTH CARE EDUCATION/TRAINING PROGRAM

## 2024-06-02 PROCEDURE — 250N000011 HC RX IP 250 OP 636: Performed by: STUDENT IN AN ORGANIZED HEALTH CARE EDUCATION/TRAINING PROGRAM

## 2024-06-02 PROCEDURE — 80053 COMPREHEN METABOLIC PANEL: CPT | Performed by: STUDENT IN AN ORGANIZED HEALTH CARE EDUCATION/TRAINING PROGRAM

## 2024-06-02 PROCEDURE — 36415 COLL VENOUS BLD VENIPUNCTURE: CPT | Performed by: STUDENT IN AN ORGANIZED HEALTH CARE EDUCATION/TRAINING PROGRAM

## 2024-06-02 RX ORDER — POTASSIUM CHLORIDE 7.45 MG/ML
10 INJECTION INTRAVENOUS
Status: COMPLETED | OUTPATIENT
Start: 2024-06-02 | End: 2024-06-02

## 2024-06-02 RX ORDER — PROCHLORPERAZINE MALEATE 5 MG
5 TABLET ORAL EVERY 6 HOURS PRN
Status: DISCONTINUED | OUTPATIENT
Start: 2024-06-02 | End: 2024-06-11 | Stop reason: HOSPADM

## 2024-06-02 RX ORDER — METOPROLOL TARTRATE 1 MG/ML
2.5 INJECTION, SOLUTION INTRAVENOUS
Status: DISCONTINUED | OUTPATIENT
Start: 2024-06-02 | End: 2024-06-08

## 2024-06-02 RX ORDER — KETOROLAC TROMETHAMINE 15 MG/ML
15 INJECTION, SOLUTION INTRAMUSCULAR; INTRAVENOUS EVERY 8 HOURS PRN
Status: DISCONTINUED | OUTPATIENT
Start: 2024-06-02 | End: 2024-06-03

## 2024-06-02 RX ORDER — PROCHLORPERAZINE 25 MG
12.5 SUPPOSITORY, RECTAL RECTAL EVERY 12 HOURS PRN
Status: DISCONTINUED | OUTPATIENT
Start: 2024-06-02 | End: 2024-06-11 | Stop reason: HOSPADM

## 2024-06-02 RX ORDER — METOPROLOL TARTRATE 25 MG/1
25 TABLET, FILM COATED ORAL 2 TIMES DAILY
Status: DISCONTINUED | OUTPATIENT
Start: 2024-06-02 | End: 2024-06-11 | Stop reason: HOSPADM

## 2024-06-02 RX ORDER — MAGNESIUM SULFATE HEPTAHYDRATE 40 MG/ML
2 INJECTION, SOLUTION INTRAVENOUS ONCE
Status: COMPLETED | OUTPATIENT
Start: 2024-06-02 | End: 2024-06-02

## 2024-06-02 RX ORDER — METOPROLOL TARTRATE 25 MG/1
25 TABLET, FILM COATED ORAL 2 TIMES DAILY
Status: DISCONTINUED | OUTPATIENT
Start: 2024-06-02 | End: 2024-06-02

## 2024-06-02 RX ADMIN — METOPROLOL TARTRATE 25 MG: 25 TABLET, FILM COATED ORAL at 14:02

## 2024-06-02 RX ADMIN — MAGNESIUM SULFATE HEPTAHYDRATE 2 G: 40 INJECTION, SOLUTION INTRAVENOUS at 13:27

## 2024-06-02 RX ADMIN — METOPROLOL TARTRATE 2.5 MG: 5 INJECTION INTRAVENOUS at 13:26

## 2024-06-02 RX ADMIN — PIPERACILLIN AND TAZOBACTAM 4.5 G: 4; .5 INJECTION, POWDER, FOR SOLUTION INTRAVENOUS at 20:56

## 2024-06-02 RX ADMIN — TAMSULOSIN HYDROCHLORIDE 0.4 MG: 0.4 CAPSULE ORAL at 21:07

## 2024-06-02 RX ADMIN — POTASSIUM CHLORIDE 10 MEQ: 7.46 INJECTION, SOLUTION INTRAVENOUS at 10:50

## 2024-06-02 RX ADMIN — HYDROMORPHONE HYDROCHLORIDE 0.2 MG: 0.2 INJECTION, SOLUTION INTRAMUSCULAR; INTRAVENOUS; SUBCUTANEOUS at 03:12

## 2024-06-02 RX ADMIN — PIPERACILLIN AND TAZOBACTAM 4.5 G: 4; .5 INJECTION, POWDER, FOR SOLUTION INTRAVENOUS at 14:04

## 2024-06-02 RX ADMIN — SODIUM CHLORIDE: 900 INJECTION INTRAVENOUS at 18:39

## 2024-06-02 RX ADMIN — ACETAMINOPHEN 975 MG: 325 TABLET, FILM COATED ORAL at 00:30

## 2024-06-02 RX ADMIN — SODIUM CHLORIDE 1000 ML: 9 INJECTION, SOLUTION INTRAVENOUS at 14:05

## 2024-06-02 RX ADMIN — PANTOPRAZOLE SODIUM 40 MG: 40 INJECTION, POWDER, FOR SOLUTION INTRAVENOUS at 21:00

## 2024-06-02 RX ADMIN — PIPERACILLIN AND TAZOBACTAM 4.5 G: 4; .5 INJECTION, POWDER, FOR SOLUTION INTRAVENOUS at 07:01

## 2024-06-02 RX ADMIN — ONDANSETRON 4 MG: 2 INJECTION INTRAMUSCULAR; INTRAVENOUS at 08:35

## 2024-06-02 RX ADMIN — HEPARIN SODIUM 5000 UNITS: 5000 INJECTION, SOLUTION INTRAVENOUS; SUBCUTANEOUS at 17:21

## 2024-06-02 RX ADMIN — METOPROLOL TARTRATE 25 MG: 25 TABLET, FILM COATED ORAL at 21:04

## 2024-06-02 RX ADMIN — ONDANSETRON 4 MG: 2 INJECTION INTRAMUSCULAR; INTRAVENOUS at 03:12

## 2024-06-02 RX ADMIN — PIPERACILLIN AND TAZOBACTAM 4.5 G: 4; .5 INJECTION, POWDER, FOR SOLUTION INTRAVENOUS at 00:30

## 2024-06-02 RX ADMIN — PROCHLORPERAZINE EDISYLATE 5 MG: 5 INJECTION INTRAMUSCULAR; INTRAVENOUS at 10:46

## 2024-06-02 RX ADMIN — PANTOPRAZOLE SODIUM 40 MG: 40 INJECTION, POWDER, FOR SOLUTION INTRAVENOUS at 10:46

## 2024-06-02 RX ADMIN — POTASSIUM CHLORIDE 10 MEQ: 7.46 INJECTION, SOLUTION INTRAVENOUS at 11:58

## 2024-06-02 RX ADMIN — ACETAMINOPHEN 975 MG: 325 TABLET, FILM COATED ORAL at 15:57

## 2024-06-02 RX ADMIN — KETOROLAC TROMETHAMINE 15 MG: 15 INJECTION, SOLUTION INTRAMUSCULAR; INTRAVENOUS at 12:03

## 2024-06-02 ASSESSMENT — ACTIVITIES OF DAILY LIVING (ADL)
ADLS_ACUITY_SCORE: 51
ADLS_ACUITY_SCORE: 46
ADLS_ACUITY_SCORE: 51
ADLS_ACUITY_SCORE: 47
ADLS_ACUITY_SCORE: 51
ADLS_ACUITY_SCORE: 51
ADLS_ACUITY_SCORE: 46
ADLS_ACUITY_SCORE: 51
ADLS_ACUITY_SCORE: 46
ADLS_ACUITY_SCORE: 46
ADLS_ACUITY_SCORE: 47
ADLS_ACUITY_SCORE: 46
ADLS_ACUITY_SCORE: 51
ADLS_ACUITY_SCORE: 46
ADLS_ACUITY_SCORE: 51
ADLS_ACUITY_SCORE: 51

## 2024-06-02 NOTE — PROGRESS NOTES
[facility]    Medicine Progress Note - Hospitalist Service    Date of Admission:  5/31/2024    Assessment & Plan   Eddie Peralta is a 84 year old male with a history of Cataract, Arthritis, Atrial Fibrillation, GERD, HLD, BPH, DDD, Spinal Stenosis who presents to the emergency department for evaluation of constipation and abdominal pain following a spinal fusion of L4 and L5 on 5/28 at [facility].      CT A/P on presentation showed mildly dilated ileal small bowel loops and ascending colon with tapering to non-dilated transverse/distal colon consistent with ileus.  He was monitored but unfortunately worsened over 24-48 hours.  XR showed worsened dilation of small bowel loops and lactic climbed.  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      General surgery & [facility] are following.  Patient has had some relief with tap water enema but remains very nauseated and distended.      Hospital course complicated by afib with RVR.        Post Op Ileus vs early SBO:  Acute onset of abdominal pain, distension and nausea.  Last BM on 5/29.  Using Oxycodone for pain control after his spine surgery on 5/28.  On arrival, CT abd/pelvis reveals a mildly dilated distal ileal small bowel loops and ascending colon with tapering to nondilated transverse/distal colon consistent with ileus.  No discrete transition point.  Abd XR on AM 6/1 shows worsening.  Repeat CT on 6/1 shows increasingly distended cecum - now at 9.5 cm.  Discussed with surgery & [facility] - consideration for early shadi's (although cecum is generally >12 cm with this).  GI recommended tap water enema which helped with abdominal pain but patient has ongoing nausea and abdominal distention.   -General surgery & [facility] following, appreciate recs   -Repeat XR abd was not helpful in determining cecum size due to bowel gas   -Retry tap water enema   -If patient worsens, will need to further  consider neostigmine  -NPO, IVF, antiemetics, analgesics prn; try to avoid narcotics  -Monitor serial abdominal exams  -For pain control, the patient has IV dilaudid, IV ketorolac, scheduled tylenol  -Increase to BID pantoprazole in the setting of ketorolac dosing    Chronic afib now with afib with RVR:  Diagnosed 2022 s/p Cardioversion 7/2022 with recurrence shortly after.  No hx of obstructive CAD or CHF per last Cardiology note 5/2023.  Has a prescriptions for Lasix prn for edema but does not take any medications for rate control.  Early during this hospitalization, his HR was well controlled.   Unfortunately, this has slowly increased in the setting of pain/nausea and then jumped to 150-170s after ambulating on 6/2.  Cardiac telemetry is consistent with afib.  -IV metoprolol 2.5 mg q4h prn ordered for HR >120  -I have also ordered an additional 1x metoprolol IV 2.5 in the case the above is not effective  -Start metoprolol tartrate 25 mg BID  -Will give 1L fluid bolus as the patient reports his UOP has reduced  -If this is ineffective, anticipate starting diltiazem gtt  -Continue MIVF with LR at 100 ml/hr  -K goal >4, Mag goal >2  -Per family, Eliquis was planned to be resumed 6/1/24 per his spine surgery team  -Hold given lower chadsvasc (2 - only for age), and potential need for surgery if he fails to improve  -Cardiac telemetry  -If persistent, will consider transfer to cardiac tele floor     Lumbar Spinal Stenosis  S/p Transfacet/Transforaminal L4 to: L5 Posterior Spine Fusion with Instrumentation L4 to: L5 Transforaminal Lumbar Interbody Fusion L4 to: L5 on 5/28/24 at [facility]   Surgical incision examined by ED physician with no signs of infection.  -Continue brace and post op lifting and bending restrictions  -PT/SW consult      HLD  - resume pta Atorvastatin when taking po well     GERD  Hx Conti's Esophagus  - hold pta Omeprazole and start IV Protonix     BPH  - continue Flomax and monitor PVR          Diet:  "NPO for Medical/Clinical Reasons Except for: Meds, Ice Chips    DVT Prophylaxis: Heparin SQ  Tse Catheter: Not present  Lines: None     Cardiac Monitoring: ACTIVE order. Indication: Tachyarrhythmias, acute (48 hours)  Code Status: Full Code      Clinically Significant Risk Factors          # Hypocalcemia: Lowest Ca = 8.2 mg/dL in last 2 days, will monitor and replace as appropriate     # Hypoalbuminemia: Lowest albumin = 3.2 g/dL at 6/2/2024  5:47 AM, will monitor as appropriate            # Overweight: Estimated body mass index is 29.42 kg/m  as calculated from the following:    Height as of this encounter: 1.778 m (5' 10\").    Weight as of this encounter: 93 kg (205 lb 0.4 oz)., PRESENT ON ADMISSION            Disposition Plan     Medically Ready for Discharge: Anticipated in 2-4 Days        Bienvenido Turner DO  Hospitalist Service  [facility]  Securely message with logolineup (more info)  Text page via [employer] Paging/Directory   ______________________________________________________________________    Interval History   Multiple BMs overnight per patient report with improvement in his pain.  Unfortunately with ongoing nausea.  ~800 ml out of NGT.  No fevers or chills, shortness of breath or chest pain.     HR much more elevated this afternoon after getting up to walk and use the bathroom.  Patient denies chest pain, shortness of breath.  He endorses ongoing nausea/vertigo that is largely unchanged from presentation.  Son is at bedside and updated.     Physical Exam   Vital Signs: Temp: 97  F (36.1  C) Temp src: Temporal BP: (!) 150/97 Pulse: 56   Resp: 16 SpO2: 98 % O2 Device: None (Room air)    Weight: 205 lbs .44 oz    General Appearance: Patient awake & alert.  Appears more comfortable   Respiratory: Lungs are CTAB.  Work of breathing appears normal on room air.  Cardiovascular: Irregular rate and rhythm.  No murmurs rubs or gallops.  No edema present.  GI: Moderate abdominal distention, " active BS, TTP improved.  No rebound tenderness or guarding.   Skin: No rashes or lesions exposed skin.  Neuro:  The patient is moving all extremities.  No obvious focal asymmetries.   Other: Patient is appropriate and oriented x3.     Medical Decision Making       50 MINUTES SPENT BY ME on the date of service doing chart review, history, exam, documentation & further activities per the note.      Data   ------------------------- PAST 24 HR DATA REVIEWED -----------------------------------------------    I have personally reviewed the following data over the past 24 hrs:    13.4 (H)  \   14.6   / 195     141 106 26.3 (H) /  140 (H)   3.8 22 0.70 \     ALT: 12 AST: 16 AP: 69 TBILI: 1.4 (H)   ALB: 3.2 (L) TOT PROTEIN: 6.1 (L) LIPASE: N/A     Procal: N/A CRP: N/A Lactic Acid: 1.7         Imaging results reviewed over the past 24 hrs:   Recent Results (from the past 24 hour(s))   CT Abdomen Pelvis w Contrast    Narrative    EXAM: CT ABDOMEN PELVIS W CONTRAST  LOCATION: [facility]  DATE: 6/1/2024    INDICATION: Here with ileus vs SBO.  Clinically worsening despite cares.  Lactic acid climbing.  Rule out intraabdominal pathology, ischemic gut, perforation, etc.  COMPARISON: 5/31/2024  TECHNIQUE: CT scan of the abdomen and pelvis was performed following injection of IV contrast. Multiplanar reformats were obtained. Dose reduction techniques were used.  CONTRAST: 100mL isovue 370    FINDINGS:   LOWER CHEST: Bibasilar atelectasis.    HEPATOBILIARY: Small cyst noted within the left hepatic lobe. Liver otherwise unremarkable. Small stones are noted within the gallbladder.    PANCREAS: Normal.    SPLEEN: Normal.    ADRENAL GLANDS: Normal.    KIDNEYS/BLADDER: No significant mass, stone, or hydronephrosis.    BOWEL: Several dilated loops of small bowel are noted throughout the abdomen and pelvis to the level of the cecum measuring up to 3.9 cm. The cecum is distended measuring up to 9.5 cm. The colon  gradually tapers near the sigmoid colon with no obstructing   mass.    LYMPH NODES: Normal.    VASCULATURE: Mild atherosclerotic disease of the abdominal aorta.     PELVIC ORGANS: Bladder within normal limits.    MUSCULOSKELETAL: Degenerative changes of spine and hips. Status post surgical fixation of lower lumbar spine.      Impression    IMPRESSION:   1.  Several dilated loops of small bowel are noted throughout the abdomen and pelvis to the level of the cecum measuring up to 3.9 cm. The cecum is distended measuring up to 9.5 cm. The colon gradually tapers near the sigmoid colon with no obstructing   mass. Findings are suggestive of ileus, although obstruction also remains a possibility. Recommend surgical consultation. No evidence of ischemia.  2.  Cholelithiasis.

## 2024-06-02 NOTE — PROVIDER NOTIFICATION
[employer] paged for tele event    FYI: pt had 8 beats of V run with 157BPM at 1208am per tele tech

## 2024-06-02 NOTE — PLAN OF CARE
"Patient A&Ox4. NPO. NG tube in place and had 900 out this shift of dark brown/green output. Nausea this morning not relieved with Zofran. Compazine effective. Water enema given this shift followed by a medium BM. Patient stated he felt better. Abdomen continues to be firm and distended. Abd x-ray this morning, no change. Pt up and chair and ambulating halls Ax1 with walker and GB. Back brace when OOB. Tele reported sustained A-fib (160-180 BPM) after pt was on the commode. IV and PO metoprolol given and heart rate came down to the low 100s. EKG ordered. K and Mag replaced. IV Zosyn given. IV ketoralac given and effective for pain. GI and surgery following.  Son at bedside throughout the day.    Problem: Adult Inpatient Plan of Care  Goal: Plan of Care Review  Description: The Plan of Care Review/Shift note should be completed every shift.  The Outcome Evaluation is a brief statement about your assessment that the patient is improving, declining, or no change.  This information will be displayed automatically on your shift  note.  6/2/2024 1546 by Yun Wilson RN  Outcome: Progressing  Flowsheets (Taken 6/2/2024 1546)  Plan of Care Reviewed With:   patient   family  Overall Patient Progress: improving  6/2/2024 1544 by Yun Wilson RN  Outcome: Progressing  Flowsheets (Taken 6/2/2024 1544)  Outcome Evaluation: NPO, BM today with water enema. 900mLs output in NG.  Goal: Patient-Specific Goal (Individualized)  Description: You can add care plan individualizations to a care plan. Examples of Individualization might be:  \"Parent requests to be called daily at 9am for status\", \"I have a hard time hearing out of my right ear\", or \"Do not touch me to wake me up as it startles  me\".  6/2/2024 1546 by Yun Wilson RN  Outcome: Progressing  6/2/2024 1544 by Yun Wilson RN  Outcome: Progressing  Goal: Absence of Hospital-Acquired Illness or Injury  6/2/2024 1546 by Yun Wilson RN  Outcome: " Progressing  6/2/2024 1544 by Yun Wilson RN  Outcome: Progressing  Intervention: Identify and Manage Fall Risk  Recent Flowsheet Documentation  Taken 6/2/2024 1200 by Yun Wilson RN  Safety Promotion/Fall Prevention:   activity supervised   assistive device/personal items within reach   safety round/check completed   lighting adjusted   nonskid shoes/slippers when out of bed  Intervention: Prevent Skin Injury  Recent Flowsheet Documentation  Taken 6/2/2024 1200 by Yun Wilson RN  Body Position: position changed independently  Intervention: Prevent Infection  Recent Flowsheet Documentation  Taken 6/2/2024 1200 by Yun Wilson RN  Infection Prevention:   single patient room provided   rest/sleep promoted   hand hygiene promoted  Goal: Optimal Comfort and Wellbeing  6/2/2024 1546 by Yun Wilson RN  Outcome: Progressing  6/2/2024 1544 by Yun Wilson RN  Outcome: Progressing  Intervention: Monitor Pain and Promote Comfort  Recent Flowsheet Documentation  Taken 6/2/2024 1203 by Yun Wilson RN  Pain Management Interventions: medication (see MAR)  Goal: Readiness for Transition of Care  6/2/2024 1546 by Yun Wilson RN  Outcome: Progressing  6/2/2024 1544 by Yun Wilson RN  Outcome: Progressing     Problem: Intestinal Obstruction  Goal: Optimal Bowel Function  6/2/2024 1546 by Yun Wilson RN  Outcome: Progressing  6/2/2024 1544 by Yun Wilson RN  Outcome: Progressing  Intervention: Promote Bowel Function  Recent Flowsheet Documentation  Taken 6/2/2024 1402 by Yun Wilson RN  Chair: Recline and up in chair  Taken 6/2/2024 1203 by Yun Wilson RN  Chair: Upright in chair  Taken 6/2/2024 1200 by Yun Wilson RN  Body Position: position changed independently  Head of Bed (HOB) Positioning: HOB at 30-45 degrees  Positioning/Transfer Devices:   pillows   in use  Goal: Fluid and Electrolyte Balance  6/2/2024 1546 by Yun Wilson RN  Outcome:  Progressing  6/2/2024 1544 by Yun Wilson RN  Outcome: Progressing  Intervention: Monitor and Manage Hypovolemia  Recent Flowsheet Documentation  Taken 6/2/2024 1200 by Yun Wilson RN  Fluid/Electrolyte Management:   intravenous fluid replacement initiated   electrolyte supplement initiated  Goal: Absence of Infection Signs and Symptoms  6/2/2024 1546 by Yun Wilson RN  Outcome: Progressing  6/2/2024 1544 by Yun Wilson RN  Outcome: Progressing  Goal: Optimize Nutrition Status  6/2/2024 1546 by Yun Wilson RN  Outcome: Progressing  6/2/2024 1544 by Yun Wilson RN  Outcome: Progressing  Goal: Optimal Pain Control and Function  6/2/2024 1546 by Yun Wilson RN  Outcome: Progressing  6/2/2024 1544 by Yun Wilson RN  Outcome: Progressing  Intervention: Prevent or Manage Pain  Recent Flowsheet Documentation  Taken 6/2/2024 1203 by Yun Wilson RN  Pain Management Interventions: medication (see MAR)     Problem: Skin Injury Risk Increased  Goal: Skin Health and Integrity  6/2/2024 1546 by Yun Wilson RN  Outcome: Progressing  6/2/2024 1544 by Yun Wilson RN  Outcome: Progressing  Intervention: Plan: Nurse Driven Intervention: Moisture Management  Recent Flowsheet Documentation  Taken 6/2/2024 1203 by Yun Wilson RN  Bathing/Skin Care: bath, partial  Intervention: Optimize Skin Protection  Recent Flowsheet Documentation  Taken 6/2/2024 1203 by Yun Wilson RN  Activity Management: up to bedside commode  Taken 6/2/2024 1200 by Yun Wilson RN  Activity Management:   ambulated outside room   back to bed  Head of Bed (HOB) Positioning: HOB at 30-45 degrees   Goal Outcome Evaluation:

## 2024-06-02 NOTE — CONSULTS
GASTROENTEROLOGY CONSULTATION     Eddie Peralta  [address] [facility] [id number]  84 year old male    Admission Date/Time: 5/31/2024  Primary Care Provider: No Ref-Primary, Physician    We were asked to see the patient in consultation by Dr. Turner for evaluation of lynda.        HPI:  Eddie Peralta is a 84 year old male with a past medical history significant for atrial fibrillation, GERD, hyperlipidemia, spinal stenosis status post spinal fusion of L4 and L5.  He presented to the emergency room with constipation and abdominal pain.On 5/31/2024 he had a CT scan of the abdomen and pelvis that showed mildly dilated distal ileal small bowel loops and ascending colon with tapering to nondilated transverse distal colon consistent with an ileus.  His abdominal pain worsened and therefore another CT scan was obtained on June 1, 2024.  This showed several dilated loops of small bowel throughout the abdomen and pelvis to the level of the cecum, the cecum was distended measuring at 9.5 cm.  The colon gradually tapered near the sigmoid colon with no obstructing mass.  Findings are suggestive of ileus.  No evidence of ischemia.    Surgery has been following the patient and currently he is being treated conservatively with an NG tube, minimizing narcotics, encouraging the patient to get out of bed and walk.  I did touch base with the hospitalist regarding this patient last evening, and I recommended that he receive a tapwater enema and he did get results from this, and he received another around noon today and again got good results.  His abdomen is still distended but he reports his nausea has resolved and his abd pain is much better.      His sodium is normal, potassium is normal, chloride is normal, creatinine is normal, lactic acid is normal at 1.7 (it did peak to 3.0 yesterday).    ROS: A comprehensive ten point review of systems was negative aside from those in mentioned in the HPI.      MEDICATIONS:    Current Facility-Administered Medications   Medication Dose Route Frequency Provider Last Rate Last Admin    acetaminophen (TYLENOL) tablet 975 mg  975 mg Oral Q8H Bienvenido Turner, DO   975 mg at 06/02/24 0030    Or    acetaminophen (TYLENOL) Suppository 650 mg  650 mg Rectal Q8H Bienvenido Turner, DO        heparin ANTICOAGULANT injection 5,000 Units  5,000 Units Subcutaneous Q8H Bienvenido Turner, DO        HYDROmorphone (DILAUDID) injection 0.2 mg  0.2 mg Intravenous Q3H PRN Bienvenido Turner, DO   0.2 mg at 06/02/24 0312    Or    HYDROmorphone (PF) (DILAUDID) injection 0.3 mg  0.3 mg Intravenous Q3H PRN Bienvenido Turner, DO        ketorolac (TORADOL) injection 15 mg  15 mg Intravenous Q8H PRN Bienvenido Turner, DO   15 mg at 06/02/24 1203    lidocaine (LMX4) cream   Topical Q1H PRN Poonam Ramsey PA-C        lidocaine 1 % 0.1-1 mL  0.1-1 mL Other Q1H PRN Poonam Ramsey PA-C        magnesium sulfate 2 g in 50 mL sterile water intermittent infusion  2 g Intravenous Once Bienvenido Turner DO 50 mL/hr at 06/02/24 1327 2 g at 06/02/24 1327    methocarbamol (ROBAXIN) tablet 500 mg  500 mg Oral Q6H PRN Bienvenido Turner DO        metoprolol (LOPRESSOR) injection 2.5 mg  2.5 mg Intravenous Once PRN Bienvenido Turner DO        metoprolol (LOPRESSOR) injection 2.5 mg  2.5 mg Intravenous Q4H PRN Bienvenido Turner, DO   2.5 mg at 06/02/24 1326    metoprolol tartrate (LOPRESSOR) tablet 25 mg  25 mg Oral BID Bienvenido Turner, DO   25 mg at 06/02/24 1402    naloxone (NARCAN) injection 0.2 mg  0.2 mg Intravenous Q2 Min PRN Bienvenido Turner DO        Or    naloxone (NARCAN) injection 0.4 mg  0.4 mg Intravenous Q2 Min PRN Bienvenido Turner, DO        Or    naloxone (NARCAN) injection 0.2 mg  0.2 mg Intramuscular Q2 Min PRN Bienvenido Turner, DO        Or    naloxone (NARCAN) injection 0.4 mg  0.4 mg Intramuscular Q2 Min PRN Bienvenido Turner DO        ondansetron (ZOFRAN ODT) ODT tab 4 mg  4 mg Oral Q6H PRN Poonam Ramsey PA-C         "Or    ondansetron (ZOFRAN) injection 4 mg  4 mg Intravenous Q6H PRN Poonam Ramsey PA-C   4 mg at 06/02/24 0835    pantoprazole (PROTONIX) IV push injection 40 mg  40 mg Intravenous Q12H Bienvenido Turner DO   40 mg at 06/02/24 1046    piperacillin-tazobactam (ZOSYN) 4.5 g vial to attach to  mL bag  4.5 g Intravenous Q6H Bienvenido Turner DO   4.5 g at 06/02/24 1404    prochlorperazine (COMPAZINE) injection 5 mg  5 mg Intravenous Q6H PRN Bienvenido Turner DO   5 mg at 06/02/24 1046    Or    prochlorperazine (COMPAZINE) tablet 5 mg  5 mg Oral Q6H PRN Bienvenido Turner DO        Or    prochlorperazine (COMPAZINE) suppository 12.5 mg  12.5 mg Rectal Q12H PRN Bienvenido Turner DO        simethicone (MYLICON) suspension 40 mg  40 mg Oral Q6H PRN Bienvenido Turner DO   40 mg at 06/01/24 1222    sodium chloride (PF) 0.9% PF flush 3 mL  3 mL Intracatheter Q8H Poonam Ramsey PA-C   3 mL at 06/02/24 1056    sodium chloride (PF) 0.9% PF flush 3 mL  3 mL Intracatheter q1 min prn Poonam Ramsey PA-C        sodium chloride 0.9 % infusion   Intravenous Continuous Bienvenido Turner  mL/hr at 06/02/24 0808 Rate Verify at 06/02/24 0808    sodium chloride 0.9% BOLUS 1,000 mL  1,000 mL Intravenous Once Bienvenido Turner DO 1,000 mL/hr at 06/02/24 1405 1,000 mL at 06/02/24 1405    tamsulosin (FLOMAX) capsule 0.4 mg  0.4 mg Oral At Bedtime Poonam Ramsey PA-C   0.4 mg at 06/01/24 2209       ALLERGIES: No Known Allergies    No past medical history on file.    No past surgical history on file.      SOCIAL HISTORY:       FAMILY HISTORY:  Reviewed in his chart    PHYSICAL EXAM:   /74   Pulse 111   Temp 97  F (36.1  C) (Temporal)   Resp 16   Ht 1.778 m (5' 10\")   Wt 93 kg (205 lb 0.4 oz)   SpO2 98%   BMI 29.42 kg/m      Constitutional: NAD, comfortable  Cardiovascular: RRR  Respiratory: CTAB  Psychiatric: mentation appears normal and affect normal/bright  Head: Normocephalic. Atraumatic.    Neck: Neck supple. "   Eyes:  no icterus  ENT: hearing adequate  Abdomen:   Distended, nontender  NEURO: grossly negative      ADDITIONAL COMMENTS:   I reviewed the patient's new clinical lab test results.   Recent Labs   Lab Test 06/02/24  0547 06/01/24  1602 06/01/24  1030 05/31/24  0810   WBC 13.4*  --  13.3* 11.9*   HGB 14.6 14.9 15.3 13.7   MCV 93  --  94 92     --  167 141*     Recent Labs   Lab Test 06/02/24  0547 06/01/24  1657 06/01/24  1030    138 138   POTASSIUM 3.8 4.7 4.3   CHLORIDE 106 102 103   CO2 22 22 19*   BUN 26.3* 24.7* 22.4   CR 0.70 0.81 0.77   ANIONGAP 13 14 16*   REN 8.2* 8.6* 8.4*   * 164* 137*     Recent Labs   Lab Test 06/02/24  0547 06/01/24  1818 06/01/24  1657 06/01/24  1030 05/31/24  0916 05/31/24  0810   ALBUMIN 3.2*  --  3.4* 3.3*  --  3.4*   BILITOTAL 1.4*  --  1.4* 1.4*  --  1.3*   ALT 12  --  13 9  --  14   AST 16  --  10 17  --  20   ALKPHOS 69  --  77 77  --  79   PROTEIN  --  30*  --   --  Negative  --    LIPASE  --   --   --   --   --  20             .    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  Agree with NG tube, encouraging ambulation, would schedule tap water enemas every 8 hours for the next 24 hours.  If he does not continue to improve, and he has no contraindications to neostigmine then that would be the next step.     Nikhil Washburn MD  [facility]

## 2024-06-02 NOTE — PLAN OF CARE
"   Goal Outcome Evaluation:    Plan of Care Reviewed With: patient, child    Overall Patient Progress: improvingOverall Patient Progress: improving    Outcome Evaluation: NPO; NG LIS with 850 out; Bm x1; moderate pain relieved by Dilaudid x1 and mild nausea zofran given    Problem: Adult Inpatient Plan of Care  Goal: Plan of Care Review  Description: The Plan of Care Review/Shift note should be completed every shift.  The Outcome Evaluation is a brief statement about your assessment that the patient is improving, declining, or no change.  This information will be displayed automatically on your shift  note.  Outcome: Progressing  Flowsheets (Taken 6/2/2024 0729)  Outcome Evaluation:   NPO   NG LIS with 850 out   Bm x1   moderate pain relieved by Dilaudid x1 and mild nausea zofran given  Plan of Care Reviewed With:   patient   child  Overall Patient Progress: improving  Goal: Patient-Specific Goal (Individualized)  Description: You can add care plan individualizations to a care plan. Examples of Individualization might be:  \"Parent requests to be called daily at 9am for status\", \"I have a hard time hearing out of my right ear\", or \"Do not touch me to wake me up as it startles  me\".  Outcome: Progressing  Goal: Absence of Hospital-Acquired Illness or Injury  Outcome: Progressing  Intervention: Identify and Manage Fall Risk  Recent Flowsheet Documentation  Taken 6/2/2024 0030 by Jose Fritz RN  Safety Promotion/Fall Prevention:   activity supervised   assistive device/personal items within reach   safety round/check completed   lighting adjusted   nonskid shoes/slippers when out of bed  Intervention: Prevent Skin Injury  Recent Flowsheet Documentation  Taken 6/2/2024 0030 by Jose Fritz, RN  Body Position: position changed independently  Intervention: Prevent Infection  Recent Flowsheet Documentation  Taken 6/2/2024 0030 by Jose Fritz, RN  Infection Prevention:   single patient room provided   rest/sleep " promoted   hand hygiene promoted  Goal: Optimal Comfort and Wellbeing  Outcome: Progressing  Intervention: Monitor Pain and Promote Comfort  Recent Flowsheet Documentation  Taken 6/2/2024 0030 by Jose Fritz RN  Pain Management Interventions:   medication (see MAR)   repositioned  Goal: Readiness for Transition of Care  Outcome: Progressing        Problem: Intestinal Obstruction  Goal: Optimal Bowel Function  Outcome: Progressing  Intervention: Promote Bowel Function  Recent Flowsheet Documentation  Taken 6/2/2024 0030 by Jose Fritz RN  Body Position: position changed independently  Head of Bed (HOB) Positioning: HOB at 30-45 degrees  Positioning/Transfer Devices:   pillows   in use  Goal: Fluid and Electrolyte Balance  Outcome: Progressing  Goal: Absence of Infection Signs and Symptoms  Outcome: Progressing  Goal: Optimize Nutrition Status  Outcome: Progressing  Goal: Optimal Pain Control and Function  Outcome: Progressing  Intervention: Prevent or Manage Pain  Recent Flowsheet Documentation  Taken 6/2/2024 0030 by Jose Fritz RN  Pain Management Interventions:   medication (see MAR)   repositioned

## 2024-06-02 NOTE — PROGRESS NOTES
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  Continue NGT, walking, turning in bed and minimization of narcotics.         Interval History:   Lying in bed, son and significant other at bedside. Primary c/o nausea, not relieved by Zofran this am. Still bloated and not passing flatus. + BM after enema yesterday, planning another enema today. Per pt son, patient is requiring significantly less narcotics today.         Physical Exam:   Blood pressure (!) 149/98, pulse 108, temperature 98.3  F (36.8  C), temperature source Temporal, resp. rate 16, height 1.778 m (5' 10\"), weight 93 kg (205 lb 0.4 oz), SpO2 96%.    I/O last 3 completed shifts:  In: 2048 [I.V.:2048]  Out: 1300 [Urine:200; Emesis/NG output:1100]    Abdomen:   soft, distended, non-tender and absent bowel sounds           Data:     Recent Labs   Lab 06/02/24  0547 06/01/24  1602 06/01/24  1030 05/31/24  0810   WBC 13.4*  --  13.3* 11.9*   HGB 14.6 14.9 15.3 13.7   HCT 42.2  --  44.6 39.7*   MCV 93  --  94 92     --  167 141*       oNva Edwards PA-C  Text page: [phone] 8 am to 4 pm  After 4 pm, call [phone]       "

## 2024-06-02 NOTE — PLAN OF CARE
"Goal Outcome Evaluation:           Overall Patient Progress: no changeOverall Patient Progress: no change    Patient A&Ox4. BP slightly elevated. NG tube in place and had 300 out this shift of dark brown/green output. Water enema given this shift and he had a medium BM patient stated he felt better and was able to sleep. No abdominal pain just discomfort/fullness. Now patient is awake reported that he was delirious, pagesailaja CLIFFORD advised to monitor and page MD if it worsens. UA send earlier this evening, Abdominal CT done surgery came to see patient. Bolus given for lactic acid of 3 and has continuous fluids running.  Zosyn IV given. Will continue to follow plan of care.   Problem: Adult Inpatient Plan of Care  Goal: Plan of Care Review  Description: The Plan of Care Review/Shift note should be completed every shift.  The Outcome Evaluation is a brief statement about your assessment that the patient is improving, declining, or no change.  This information will be displayed automatically on your shift  note.  Outcome: Progressing  Flowsheets (Taken 6/1/2024 2352)  Overall Patient Progress: no change  Goal: Patient-Specific Goal (Individualized)  Description: You can add care plan individualizations to a care plan. Examples of Individualization might be:  \"Parent requests to be called daily at 9am for status\", \"I have a hard time hearing out of my right ear\", or \"Do not touch me to wake me up as it startles  me\".  Outcome: Progressing  Goal: Absence of Hospital-Acquired Illness or Injury  Outcome: Progressing  Intervention: Identify and Manage Fall Risk  Recent Flowsheet Documentation  Taken 6/1/2024 1800 by Janet Stanley, RN  Safety Promotion/Fall Prevention:   activity supervised   assistive device/personal items within reach  Intervention: Prevent Skin Injury  Recent Flowsheet Documentation  Taken 6/1/2024 1800 by Janet Stanley, RN  Body Position: position changed independently  Goal: Optimal Comfort and " Wellbeing  Outcome: Progressing  Intervention: Monitor Pain and Promote Comfort  Recent Flowsheet Documentation  Taken 6/1/2024 1619 by Janet Stanley RN  Pain Management Interventions: medication (see MAR)  Goal: Readiness for Transition of Care  Outcome: Progressing     Problem: Intestinal Obstruction  Goal: Optimal Bowel Function  Outcome: Progressing  Intervention: Promote Bowel Function  Recent Flowsheet Documentation  Taken 6/1/2024 1800 by Janet Stanley, RN  Body Position: position changed independently  Positioning/Transfer Devices:   pillows   in use  Goal: Fluid and Electrolyte Balance  Outcome: Progressing  Intervention: Monitor and Manage Hypovolemia  Recent Flowsheet Documentation  Taken 6/1/2024 1800 by Janet Stanley RN  Fluid/Electrolyte Management: intravenous fluids adjusted  Goal: Absence of Infection Signs and Symptoms  Outcome: Progressing  Goal: Optimize Nutrition Status  Outcome: Progressing  Goal: Optimal Pain Control and Function  Outcome: Progressing  Intervention: Prevent or Manage Pain  Recent Flowsheet Documentation  Taken 6/1/2024 1619 by Janet Stanley RN  Pain Management Interventions: medication (see MAR)

## 2024-06-03 ENCOUNTER — APPOINTMENT (OUTPATIENT)
Dept: PHYSICAL THERAPY | Facility: CLINIC | Age: 84
DRG: 394 | End: 2024-06-03
Payer: MEDICARE

## 2024-06-03 LAB
ALBUMIN SERPL BCG-MCNC: 3.1 G/DL (ref 3.5–5.2)
ALP SERPL-CCNC: 56 U/L (ref 40–150)
ALT SERPL W P-5'-P-CCNC: 12 U/L (ref 0–70)
ANION GAP SERPL CALCULATED.3IONS-SCNC: 11 MMOL/L (ref 7–15)
AST SERPL W P-5'-P-CCNC: 19 U/L (ref 0–45)
ATRIAL RATE - MUSE: 87 BPM
BILIRUB SERPL-MCNC: 1.3 MG/DL
BUN SERPL-MCNC: 32.3 MG/DL (ref 8–23)
CALCIUM SERPL-MCNC: 7.9 MG/DL (ref 8.8–10.2)
CHLORIDE SERPL-SCNC: 109 MMOL/L (ref 98–107)
CREAT SERPL-MCNC: 0.9 MG/DL (ref 0.67–1.17)
DEPRECATED HCO3 PLAS-SCNC: 22 MMOL/L (ref 22–29)
DIASTOLIC BLOOD PRESSURE - MUSE: NORMAL MMHG
EGFRCR SERPLBLD CKD-EPI 2021: 85 ML/MIN/1.73M2
ERYTHROCYTE [DISTWIDTH] IN BLOOD BY AUTOMATED COUNT: 12.7 % (ref 10–15)
ERYTHROCYTE [DISTWIDTH] IN BLOOD BY AUTOMATED COUNT: 12.7 % (ref 10–15)
GLUCOSE SERPL-MCNC: 106 MG/DL (ref 70–99)
HCT VFR BLD AUTO: 38 % (ref 40–53)
HCT VFR BLD AUTO: 38.4 % (ref 40–53)
HGB BLD-MCNC: 12.9 G/DL (ref 13.3–17.7)
HGB BLD-MCNC: 13.1 G/DL (ref 13.3–17.7)
INTERPRETATION ECG - MUSE: NORMAL
MAGNESIUM SERPL-MCNC: 2 MG/DL (ref 1.7–2.3)
MCH RBC QN AUTO: 32 PG (ref 26.5–33)
MCH RBC QN AUTO: 32 PG (ref 26.5–33)
MCHC RBC AUTO-ENTMCNC: 33.9 G/DL (ref 31.5–36.5)
MCHC RBC AUTO-ENTMCNC: 34.1 G/DL (ref 31.5–36.5)
MCV RBC AUTO: 94 FL (ref 78–100)
MCV RBC AUTO: 94 FL (ref 78–100)
P AXIS - MUSE: NORMAL DEGREES
PLATELET # BLD AUTO: 168 10E3/UL (ref 150–450)
PLATELET # BLD AUTO: 169 10E3/UL (ref 150–450)
POTASSIUM SERPL-SCNC: 3.7 MMOL/L (ref 3.4–5.3)
PR INTERVAL - MUSE: NORMAL MS
PROT SERPL-MCNC: 5.5 G/DL (ref 6.4–8.3)
QRS DURATION - MUSE: 86 MS
QT - MUSE: 338 MS
QTC - MUSE: 451 MS
R AXIS - MUSE: 38 DEGREES
RBC # BLD AUTO: 4.03 10E6/UL (ref 4.4–5.9)
RBC # BLD AUTO: 4.09 10E6/UL (ref 4.4–5.9)
SODIUM SERPL-SCNC: 142 MMOL/L (ref 135–145)
SYSTOLIC BLOOD PRESSURE - MUSE: NORMAL MMHG
T AXIS - MUSE: 45 DEGREES
UFH PPP CHRO-ACNC: 0.23 IU/ML
VENTRICULAR RATE- MUSE: 107 BPM
WBC # BLD AUTO: 3.9 10E3/UL (ref 4–11)
WBC # BLD AUTO: 4.3 10E3/UL (ref 4–11)

## 2024-06-03 PROCEDURE — 85027 COMPLETE CBC AUTOMATED: CPT | Performed by: STUDENT IN AN ORGANIZED HEALTH CARE EDUCATION/TRAINING PROGRAM

## 2024-06-03 PROCEDURE — 250N000011 HC RX IP 250 OP 636: Performed by: INTERNAL MEDICINE

## 2024-06-03 PROCEDURE — 97530 THERAPEUTIC ACTIVITIES: CPT | Mod: GP

## 2024-06-03 PROCEDURE — 99233 SBSQ HOSP IP/OBS HIGH 50: CPT | Performed by: INTERNAL MEDICINE

## 2024-06-03 PROCEDURE — 250N000013 HC RX MED GY IP 250 OP 250 PS 637: Performed by: PHYSICIAN ASSISTANT

## 2024-06-03 PROCEDURE — 250N000013 HC RX MED GY IP 250 OP 250 PS 637: Performed by: STUDENT IN AN ORGANIZED HEALTH CARE EDUCATION/TRAINING PROGRAM

## 2024-06-03 PROCEDURE — 97116 GAIT TRAINING THERAPY: CPT | Mod: GP

## 2024-06-03 PROCEDURE — 84075 ASSAY ALKALINE PHOSPHATASE: CPT | Performed by: STUDENT IN AN ORGANIZED HEALTH CARE EDUCATION/TRAINING PROGRAM

## 2024-06-03 PROCEDURE — 82374 ASSAY BLOOD CARBON DIOXIDE: CPT | Performed by: STUDENT IN AN ORGANIZED HEALTH CARE EDUCATION/TRAINING PROGRAM

## 2024-06-03 PROCEDURE — C9113 INJ PANTOPRAZOLE SODIUM, VIA: HCPCS | Performed by: STUDENT IN AN ORGANIZED HEALTH CARE EDUCATION/TRAINING PROGRAM

## 2024-06-03 PROCEDURE — 36415 COLL VENOUS BLD VENIPUNCTURE: CPT | Performed by: INTERNAL MEDICINE

## 2024-06-03 PROCEDURE — 36415 COLL VENOUS BLD VENIPUNCTURE: CPT | Performed by: STUDENT IN AN ORGANIZED HEALTH CARE EDUCATION/TRAINING PROGRAM

## 2024-06-03 PROCEDURE — 85520 HEPARIN ASSAY: CPT | Performed by: STUDENT IN AN ORGANIZED HEALTH CARE EDUCATION/TRAINING PROGRAM

## 2024-06-03 PROCEDURE — 99231 SBSQ HOSP IP/OBS SF/LOW 25: CPT | Performed by: SURGERY

## 2024-06-03 PROCEDURE — 120N000001 HC R&B MED SURG/OB

## 2024-06-03 PROCEDURE — 250N000011 HC RX IP 250 OP 636: Performed by: STUDENT IN AN ORGANIZED HEALTH CARE EDUCATION/TRAINING PROGRAM

## 2024-06-03 PROCEDURE — 83735 ASSAY OF MAGNESIUM: CPT | Performed by: STUDENT IN AN ORGANIZED HEALTH CARE EDUCATION/TRAINING PROGRAM

## 2024-06-03 PROCEDURE — 258N000003 HC RX IP 258 OP 636: Performed by: STUDENT IN AN ORGANIZED HEALTH CARE EDUCATION/TRAINING PROGRAM

## 2024-06-03 PROCEDURE — 85520 HEPARIN ASSAY: CPT | Performed by: INTERNAL MEDICINE

## 2024-06-03 PROCEDURE — 85049 AUTOMATED PLATELET COUNT: CPT | Performed by: INTERNAL MEDICINE

## 2024-06-03 RX ORDER — POTASSIUM CHLORIDE 7.45 MG/ML
10 INJECTION INTRAVENOUS
Status: COMPLETED | OUTPATIENT
Start: 2024-06-03 | End: 2024-06-03

## 2024-06-03 RX ORDER — HEPARIN SODIUM 10000 [USP'U]/100ML
0-5000 INJECTION, SOLUTION INTRAVENOUS CONTINUOUS
Status: DISCONTINUED | OUTPATIENT
Start: 2024-06-03 | End: 2024-06-05

## 2024-06-03 RX ORDER — MAGNESIUM SULFATE HEPTAHYDRATE 40 MG/ML
2 INJECTION, SOLUTION INTRAVENOUS ONCE
Status: COMPLETED | OUTPATIENT
Start: 2024-06-03 | End: 2024-06-03

## 2024-06-03 RX ADMIN — POTASSIUM CHLORIDE 10 MEQ: 7.46 INJECTION, SOLUTION INTRAVENOUS at 13:26

## 2024-06-03 RX ADMIN — PIPERACILLIN AND TAZOBACTAM 4.5 G: 4; .5 INJECTION, POWDER, FOR SOLUTION INTRAVENOUS at 10:25

## 2024-06-03 RX ADMIN — ACETAMINOPHEN 975 MG: 325 TABLET, FILM COATED ORAL at 00:16

## 2024-06-03 RX ADMIN — TAMSULOSIN HYDROCHLORIDE 0.4 MG: 0.4 CAPSULE ORAL at 21:31

## 2024-06-03 RX ADMIN — HEPARIN SODIUM 5000 UNITS: 5000 INJECTION, SOLUTION INTRAVENOUS; SUBCUTANEOUS at 00:16

## 2024-06-03 RX ADMIN — PANTOPRAZOLE SODIUM 40 MG: 40 INJECTION, POWDER, FOR SOLUTION INTRAVENOUS at 21:31

## 2024-06-03 RX ADMIN — POTASSIUM CHLORIDE 10 MEQ: 7.46 INJECTION, SOLUTION INTRAVENOUS at 12:23

## 2024-06-03 RX ADMIN — SODIUM CHLORIDE: 900 INJECTION INTRAVENOUS at 05:11

## 2024-06-03 RX ADMIN — PIPERACILLIN AND TAZOBACTAM 4.5 G: 4; .5 INJECTION, POWDER, FOR SOLUTION INTRAVENOUS at 21:31

## 2024-06-03 RX ADMIN — METOPROLOL TARTRATE 25 MG: 25 TABLET, FILM COATED ORAL at 20:09

## 2024-06-03 RX ADMIN — HEPARIN SODIUM 1100 UNITS/HR: 10000 INJECTION, SOLUTION INTRAVENOUS at 10:29

## 2024-06-03 RX ADMIN — PIPERACILLIN AND TAZOBACTAM 4.5 G: 4; .5 INJECTION, POWDER, FOR SOLUTION INTRAVENOUS at 16:35

## 2024-06-03 RX ADMIN — MAGNESIUM SULFATE HEPTAHYDRATE 2 G: 2 INJECTION, SOLUTION INTRAVENOUS at 11:12

## 2024-06-03 RX ADMIN — SODIUM CHLORIDE: 900 INJECTION INTRAVENOUS at 19:07

## 2024-06-03 RX ADMIN — PANTOPRAZOLE SODIUM 40 MG: 40 INJECTION, POWDER, FOR SOLUTION INTRAVENOUS at 10:15

## 2024-06-03 RX ADMIN — METOPROLOL TARTRATE 25 MG: 25 TABLET, FILM COATED ORAL at 10:21

## 2024-06-03 RX ADMIN — PIPERACILLIN AND TAZOBACTAM 4.5 G: 4; .5 INJECTION, POWDER, FOR SOLUTION INTRAVENOUS at 02:40

## 2024-06-03 ASSESSMENT — ACTIVITIES OF DAILY LIVING (ADL)
ADLS_ACUITY_SCORE: 46

## 2024-06-03 NOTE — PLAN OF CARE
"Goal Outcome Evaluation:      Plan of Care Reviewed With: patient, significant other (Son)        Overall Patient Progress: improvingOverall     Outcome Evaluation: clamped NG at 1500 for four hours with no Ice chips. will discontinued NG if residual is < 150 ml.    Heparin drip started at about 1030 at 1,100 units/hr. Recheck at 1630 Gave Bolus and increased drip to 1250 units/hr. NPO. NG tube clamped at 9867-4639. NG tube unclamped at 1900 next RN will check residual. Up Assist of 1 GB and walker along with brace. Voiding. Loose watery stools X3.  Last Tap water enema given. IVF running at 100 ML.  denied pain scheduled Tylenol was declined by pt. IV Zosyn continued. Back incision has a few steri strips on lower part. No drainage. Cms intact.       Problem: Adult Inpatient Plan of Care  Goal: Plan of Care Review  Description: The Plan of Care Review/Shift note should be completed every shift.  The Outcome Evaluation is a brief statement about your assessment that the patient is improving, declining, or no change.  This information will be displayed automatically on your shift  note.  Outcome: Progressing  Flowsheets (Taken 6/3/2024 1526)  Outcome Evaluation: clamped NG at 1500 for four hours with no Ice chips. will discontinued NG if residual is < 150 ml.  Plan of Care Reviewed With: (Son)   patient   significant other  Goal: Patient-Specific Goal (Individualized)  Description: You can add care plan individualizations to a care plan. Examples of Individualization might be:  \"Parent requests to be called daily at 9am for status\", \"I have a hard time hearing out of my right ear\", or \"Do not touch me to wake me up as it startles  me\".  Outcome: Progressing  Goal: Absence of Hospital-Acquired Illness or Injury  Outcome: Progressing  Intervention: Identify and Manage Fall Risk  Recent Flowsheet Documentation  Taken 6/3/2024 0900 by Sarah Sanches RN  Safety Promotion/Fall Prevention:   activity supervised   " assistive device/personal items within reach   clutter free environment maintained   lighting adjusted   mobility aid in reach   nonskid shoes/slippers when out of bed   patient and family education   safety round/check completed   supervised activity  Intervention: Prevent Skin Injury  Recent Flowsheet Documentation  Taken 6/3/2024 0900 by Sarah Sanches RN  Body Position: supine, head elevated  Skin Protection:   adhesive use limited   transparent dressing maintained  Device Skin Pressure Protection: absorbent pad utilized/changed  Intervention: Prevent and Manage VTE (Venous Thromboembolism) Risk  Recent Flowsheet Documentation  Taken 6/3/2024 0900 by Sarah Sanches RN  VTE Prevention/Management: SCDs (sequential compression devices) on  Intervention: Prevent Infection  Recent Flowsheet Documentation  Taken 6/3/2024 0900 by Sarah Sanches RN  Infection Prevention:   rest/sleep promoted   single patient room provided   equipment surfaces disinfected   hand hygiene promoted  Goal: Optimal Comfort and Wellbeing  Outcome: Progressing  Goal: Readiness for Transition of Care  Outcome: Progressing     Problem: Intestinal Obstruction  Goal: Optimal Bowel Function  Outcome: Progressing  Intervention: Promote Bowel Function  Recent Flowsheet Documentation  Taken 6/3/2024 0900 by Sarah Sanches RN  Body Position: supine, head elevated  Head of Bed (HOB) Positioning: HOB at 20-30 degrees  Positioning/Transfer Devices:   pillows   in use  Goal: Fluid and Electrolyte Balance  Outcome: Progressing  Intervention: Monitor and Manage Hypovolemia  Recent Flowsheet Documentation  Taken 6/3/2024 0900 by Sarah Sanches RN  Fluid/Electrolyte Management:   intravenous fluid replacement initiated   electrolyte supplement initiated  Goal: Absence of Infection Signs and Symptoms  Outcome: Progressing  Intervention: Prevent or Manage Infection  Recent Flowsheet Documentation  Taken 6/3/2024 0900 by Sarah Sanches  DIMITRIS RN  Infection Management: aseptic technique maintained  Goal: Optimize Nutrition Status  Outcome: Progressing  Goal: Optimal Pain Control and Function  Outcome: Progressing     Problem: Skin Injury Risk Increased  Goal: Skin Health and Integrity  Outcome: Progressing  Intervention: Plan: Nurse Driven Intervention: Friction and Shear  Recent Flowsheet Documentation  Taken 6/3/2024 0900 by Sarah Sanches RN  Friction/Shear Interventions: HOB 30 degrees or less  Intervention: Optimize Skin Protection  Recent Flowsheet Documentation  Taken 6/3/2024 0900 by Sarah Sanches RN  Pressure Reduction Techniques:   frequent weight shift encouraged   sit time limited to 2 hours  Skin Protection:   adhesive use limited   transparent dressing maintained  Activity Management: ambulated to bathroom  Head of Bed (HOB) Positioning: HOB at 20-30 degrees

## 2024-06-03 NOTE — PROGRESS NOTES
Assumed care, history reviewed.  Exam stable.  Patient doing a little better today though doesn't appear ready for intake yet.  Given ongoing afib will start heparin drip.  No DOAC for several days with prior hold/surgery so will use Xa dosing initially.  Once able to take oral intake can transition back to historical DOAC dose.  Will defer diet advancement to surgical/GI team.  He is having BM's but also having enema's past 24 hours so difficult to determine if clearly improved or more just enema response.  Full progress note to follow.

## 2024-06-03 NOTE — PROGRESS NOTES
"[facility]   General Surgery Progress Note        Physician Attestation     I saw and evaluated Eddie Peralta as part of a shared APRN/PA visit.     I personally reviewed the vital signs.    I personally provided a substantive portion of care for this patient and I approve the care plan as written by the PHANI.  I was involved with Medical Decision Making including: Please see A&P for additional details of medical decision making.    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 If residual < 150 ml, remove NGT and start CLD.  -GI following, recommending water enema q 8 hours x 24 hours.  -no surgery planned         Interval History:   Feels much improvement in abdominal bloating, close to baseline. Denies abd pain or nausea today. He is very hungry, having several BMs following enemas. NGT output 1900 in past day.         Physical Exam:   Blood pressure (!) 132/90, pulse 102, temperature 97.4  F (36.3  C), temperature source Temporal, resp. rate 20, height 1.778 m (5' 10\"), weight 93 kg (205 lb 0.4 oz), SpO2 97%.    I/O last 3 completed shifts:  In: 1649 [I.V.:1649]  Out: 3225 [Urine:425; Emesis/NG output:1900; Stool:900]    Abdomen:   soft, rounded, mildly tender suprapubic region.          Data:     Recent Labs   Lab 06/03/24  0656 06/02/24  0547 06/01/24  1602 06/01/24  1030   WBC 3.9* 13.4*  --  13.3*   HGB 13.1* 14.6 14.9 15.3   HCT 38.4* 42.2  --  44.6   MCV 94 93  --  94    195  --  167       Nova Edwards PA-C  Text page: [phone] 8 am to 4 pm  After 4 pm, call [phone]       "

## 2024-06-03 NOTE — PLAN OF CARE
"Goal Outcome Evaluation:      Plan of Care Reviewed With: patient, child    Overall Patient Progress: improvingOverall Patient Progress: improving    3pm-11pm RN    Patient VS are at baseline: Yes  Patient able to ambulate as they were prior to admission or with assist devices provided by therapy during their stay: Yes  Patient must void prior to discharge: Yes  Patient able to tolerate oral intake: Yes  Patient has adequate pain control using oral analgesics: Yes  NG tube in place with dark brown/green output  Walked in the hallways twice this shift  Water tap Enema given and pt had a loose watery stool states he feels better. Will continue to follow plan of care.  Problem: Adult Inpatient Plan of Care  Goal: Plan of Care Review  Description: The Plan of Care Review/Shift note should be completed every shift.  The Outcome Evaluation is a brief statement about your assessment that the patient is improving, declining, or no change.  This information will be displayed automatically on your shift  note.  Outcome: Progressing  Flowsheets (Taken 6/2/2024 8596)  Plan of Care Reviewed With:   patient   child  Overall Patient Progress: improving  Goal: Patient-Specific Goal (Individualized)  Description: You can add care plan individualizations to a care plan. Examples of Individualization might be:  \"Parent requests to be called daily at 9am for status\", \"I have a hard time hearing out of my right ear\", or \"Do not touch me to wake me up as it startles  me\".  Outcome: Progressing  Goal: Absence of Hospital-Acquired Illness or Injury  Outcome: Progressing  Intervention: Identify and Manage Fall Risk  Recent Flowsheet Documentation  Taken 6/2/2024 1726 by Janet Stanley, RN  Safety Promotion/Fall Prevention:   activity supervised   assistive device/personal items within reach   clutter free environment maintained   nonskid shoes/slippers when out of bed  Intervention: Prevent Skin Injury  Recent Flowsheet " Documentation  Taken 6/2/2024 1726 by Janet Stanley RN  Body Position: position changed independently  Intervention: Prevent and Manage VTE (Venous Thromboembolism) Risk  Recent Flowsheet Documentation  Taken 6/2/2024 1726 by Janet Stanley RN  VTE Prevention/Management: SCDs (sequential compression devices) on  Intervention: Prevent Infection  Recent Flowsheet Documentation  Taken 6/2/2024 1726 by Janet Stanley RN  Infection Prevention: hand hygiene promoted  Goal: Optimal Comfort and Wellbeing  Outcome: Progressing  Goal: Readiness for Transition of Care  Outcome: Progressing     Problem: Intestinal Obstruction  Goal: Optimal Bowel Function  Outcome: Progressing  Intervention: Promote Bowel Function  Recent Flowsheet Documentation  Taken 6/2/2024 1726 by Janet Stanley RN  Body Position: position changed independently  Positioning/Transfer Devices:   pillows   in use  Goal: Fluid and Electrolyte Balance  Outcome: Progressing  Goal: Absence of Infection Signs and Symptoms  Outcome: Progressing  Goal: Optimize Nutrition Status  Outcome: Progressing  Goal: Optimal Pain Control and Function  Outcome: Progressing     Problem: Skin Injury Risk Increased  Goal: Skin Health and Integrity  Outcome: Progressing  Intervention: Optimize Skin Protection  Recent Flowsheet Documentation  Taken 6/2/2024 1726 by Janet Stanley RN  Activity Management: ambulated outside room

## 2024-06-03 NOTE — PLAN OF CARE
"Goal Outcome Evaluation:      Plan of Care Reviewed With: patient    Overall Patient Progress: improvingOverall Patient Progress: improving    Outcome Evaluation: NPO; IVF running at 100; 2 BMs overnight; tap water enema given; denied pain ivory Tylenol given      Problem: Adult Inpatient Plan of Care  Goal: Plan of Care Review  Description: The Plan of Care Review/Shift note should be completed every shift.  The Outcome Evaluation is a brief statement about your assessment that the patient is improving, declining, or no change.  This information will be displayed automatically on your shift  note.  Outcome: Progressing  Flowsheets (Taken 6/3/2024 0709)  Outcome Evaluation:   NPO   IVF running at 100   2 BMs overnight   tap water enema given   denied pain ivory Tylenol given  Plan of Care Reviewed With: patient  Overall Patient Progress: improving  Goal: Patient-Specific Goal (Individualized)  Description: You can add care plan individualizations to a care plan. Examples of Individualization might be:  \"Parent requests to be called daily at 9am for status\", \"I have a hard time hearing out of my right ear\", or \"Do not touch me to wake me up as it startles  me\".  Outcome: Progressing  Goal: Absence of Hospital-Acquired Illness or Injury  Outcome: Progressing  Intervention: Identify and Manage Fall Risk  Recent Flowsheet Documentation  Taken 6/3/2024 0016 by Jose Fritz RN  Safety Promotion/Fall Prevention:   activity supervised   assistive device/personal items within reach   safety round/check completed   lighting adjusted   nonskid shoes/slippers when out of bed  Intervention: Prevent Skin Injury  Recent Flowsheet Documentation  Taken 6/3/2024 0016 by Jose Fritz RN  Body Position: supine, head elevated  Intervention: Prevent and Manage VTE (Venous Thromboembolism) Risk  Recent Flowsheet Documentation  Taken 6/3/2024 0016 by Jose Fritz RN  VTE Prevention/Management: SCDs (sequential compression devices) " on  Intervention: Prevent Infection  Recent Flowsheet Documentation  Taken 6/3/2024 0016 by Jose Fritz RN  Infection Prevention:   single patient room provided   rest/sleep promoted   hand hygiene promoted  Goal: Optimal Comfort and Wellbeing  Outcome: Progressing  Intervention: Monitor Pain and Promote Comfort  Recent Flowsheet Documentation  Taken 6/3/2024 0016 by Jose Fritz RN  Pain Management Interventions: (ivory med) medication (see MAR)  Goal: Readiness for Transition of Care  Outcome: Progressing     Problem: Intestinal Obstruction  Goal: Optimal Bowel Function  Outcome: Progressing  Intervention: Promote Bowel Function  Recent Flowsheet Documentation  Taken 6/3/2024 0016 by Jose Fritz RN  Body Position: supine, head elevated  Head of Bed (HOB) Positioning: HOB at 20-30 degrees  Positioning/Transfer Devices:   pillows   in use  Goal: Fluid and Electrolyte Balance  Outcome: Progressing  Goal: Absence of Infection Signs and Symptoms  Outcome: Progressing  Goal: Optimize Nutrition Status  Outcome: Progressing  Goal: Optimal Pain Control and Function  Outcome: Progressing  Intervention: Prevent or Manage Pain  Recent Flowsheet Documentation  Taken 6/3/2024 0016 by Jose Fritz RN  Pain Management Interventions: (ivory med) medication (see MAR)     Problem: Skin Injury Risk Increased  Goal: Skin Health and Integrity  Outcome: Progressing  Intervention: Optimize Skin Protection  Recent Flowsheet Documentation  Taken 6/3/2024 0016 by Jose Fritz RN  Activity Management: activity adjusted per tolerance  Head of Bed (HOB) Positioning: HOB at 20-30 degrees

## 2024-06-03 NOTE — PROGRESS NOTES
[facility]    Medicine Progress Note - Hospitalist Service    Date of Admission:  5/31/2024    Assessment & Plan   Eddie Peralta is a 84 year old male with a history of Cataract, Arthritis, Atrial Fibrillation, GERD, HLD, BPH, DDD, Spinal Stenosis who presents to the emergency department for evaluation of constipation and abdominal pain following a spinal fusion of L4 and L5 on 5/28 at [facility].      CT A/P on presentation showed mildly dilated ileal small bowel loops and ascending colon with tapering to non-dilated transverse/distal colon consistent with ileus.  He was monitored but unfortunately worsened over 24-48 hours.  XR showed worsened dilation of small bowel loops and lactic climbed.  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      General surgery & [facility] are following.  Patient has had some relief with tap water enema but remains very nauseated and distended.  Hospital course complicated by afib with RVR.        Post Op Ileus vs early SBO:  Acute onset of abdominal pain, distension and nausea.  Last BM on 5/29.  Using Oxycodone for pain control after his spine surgery on 5/28.  On arrival, CT abd/pelvis reveals a mildly dilated distal ileal small bowel loops and ascending colon with tapering to nondilated transverse/distal colon consistent with ileus.  No discrete transition point.  Abd XR on AM 6/1 shows worsening.  Repeat CT on 6/1 shows increasingly distended cecum - now at 9.5 cm.  Discussed with surgery & [facility] - consideration for early shadi's (although cecum is generally >12 cm with this).  GI recommended tap water enema which helped with abdominal pain but patient has ongoing nausea and abdominal distention.   -General surgery & [facility] following, appreciate recs  -If patient worsens, will need to further consider neostigmine  -NPO, IVF, antiemetics, analgesics prn; try to avoid narcotics  -For pain control, the  "patient has IV dilaudid, scheduled tylenol  -Continue PPI  -Possibly improved 6/3, though BM\"s in setting of recent enema's make it difficult to determine if definitive improvement yet.  Continue NPO today.  -Concern prior for possible infection/translocation of bacteria.  Patient without a definitive infection.  Now on zosyn multiple days.  Will finish the brief course tonight.  Would then monitor off abx unless more overt infection found.    Chronic afib with RVR episode during this hospital stay:  Diagnosed 2022 s/p Cardioversion 7/2022 with recurrence shortly after.  No hx of obstructive CAD or CHF per last Cardiology note 5/2023.  Has a prescriptions for Lasix prn for edema but does not take any medications for rate control.  Early during this hospitalization, his HR was well controlled.   Unfortunately, this has slowly increased in the setting of pain/nausea and then jumped to 150-170s after ambulating on 6/2.  Cardiac telemetry is consistent with afib.  -metoprolol tartrate 25 mg BID  -K goal >4, Mag goal >2  -Per family, Eliquis was planned to be resumed 6/1/24 per his spine surgery team.  CHADSVasc score in range to anticoagulate but lower range.  It was to resume post-op 6/1 but given concern for possible GI surgery/NPO status it has not yet resumed.  Given the ongoing GI issues, discussed options with the paitent/family and they elected to start IV heparin for now.  When diet able to advance would move back to DOAC.    Lumbar Spinal Stenosis  S/p Transfacet/Transforaminal L4 to: L5 Posterior Spine Fusion with Instrumentation L4 to: L5 Transforaminal Lumbar Interbody Fusion L4 to: L5 on 5/28/24 at [facility]   -Continue brace and post op lifting and bending restrictions  -PT/SW consult      HLD  - resume pta Atorvastatin when taking po     GERD  Hx Conti's Esophagus  - hold pta Omeprazole and continue IV Protonix     BPH  - continue Flomax as able with GI issues and monitor PVR    PPE Used:  Mask        Diet: " "NPO for Medical/Clinical Reasons Except for: Meds, Ice Chips    DVT Prophylaxis: Heparin drip  Tse Catheter: Not present  Lines: None     Cardiac Monitoring: ACTIVE order. Indication: Tachyarrhythmias, acute (48 hours)  Code Status: Full Code      Clinically Significant Risk Factors              # Hypoalbuminemia: Lowest albumin = 3.1 g/dL at 6/3/2024  6:56 AM, will monitor as appropriate            # Overweight: Estimated body mass index is 29.42 kg/m  as calculated from the following:    Height as of this encounter: 1.778 m (5' 10\").    Weight as of this encounter: 93 kg (205 lb 0.4 oz)., PRESENT ON ADMISSION            Disposition Plan     Medically Ready for Discharge: Anticipated in 2-4 Days     Pan Max DO  Hospitalist Service  [facility]  Securely message with [employer] (more info)  Text page via [facility] Paging/Directory   ______________________________________________________________________    Interval History   Assumed care for the day, history reviewed.  Mr. Peralta continues to have GI issues.  He is feeling better today and had some BM's, but these were in the context of recent enema's.  Denies new complaints.  Has more appetite today he reports.    Physical Exam   Vital Signs: Temp: 97.4  F (36.3  C) Temp src: Temporal BP: (!) 132/90 Pulse: 102   Resp: 20 SpO2: 97 % O2 Device: None (Room air)    Weight: 205 lbs .44 oz    GEN:  Alert, oriented, appears ill but comfortable, no overt distress.  HEENT:  Normocephalic/atraumatic, no scleral icterus, no nasal discharge, mouth moist.  CV:  Regular rate and rhythm, no murmur or JVD.  S1 + S2 noted, no S3 or S4.  LUNGS:  Clear to auscultation bilaterally without rales/rhonchi/wheezing/retractions.  Mildly decreased breath sounds bases.  Symmetric chest rise on inhalation noted.  ABD:  Hypoactive bowel sounds, soft, non-tender moderately distended.  No guarding.  EXT:  Trace edema.  No cyanosis.  No new joint synovitis noted.  SKIN:  " Dry to touch, no new exanthems noted in the visualized areas.    Medical Decision Making       Over 50 MINUTES SPENT BY ME on the date of service doing chart review, history, exam, documentation & further activities per the note.      Data   Medications   Current Facility-Administered Medications   Medication Dose Route Frequency Provider Last Rate Last Admin    heparin 25,000 units in 0.45% NaCl 250 mL ANTICOAGULANT infusion  0-5,000 Units/hr Intravenous Continuous Pan Max DO        sodium chloride 0.9 % infusion   Intravenous Continuous Bienvenido Turner  mL/hr at 06/03/24 0511 New Bag at 06/03/24 0511     Current Facility-Administered Medications   Medication Dose Route Frequency Provider Last Rate Last Admin    acetaminophen (TYLENOL) tablet 975 mg  975 mg Oral Q8H Bienvenido Turner DO   975 mg at 06/03/24 0016    Or    acetaminophen (TYLENOL) Suppository 650 mg  650 mg Rectal Q8H Bienvenido Turner DO        magnesium sulfate 2 g in 50 mL sterile water intermittent infusion  2 g Intravenous Once Bienvenido Turner DO        metoprolol tartrate (LOPRESSOR) tablet 25 mg  25 mg Oral BID Bienvenido Turner DO   25 mg at 06/02/24 2104    pantoprazole (PROTONIX) IV push injection 40 mg  40 mg Intravenous Q12H Bienvenido Turner DO   40 mg at 06/02/24 2100    piperacillin-tazobactam (ZOSYN) 4.5 g vial to attach to  mL bag  4.5 g Intravenous Q6H Bienvenido Turner  mL/hr at 06/03/24 0240 4.5 g at 06/03/24 0240    potassium chloride 10 mEq in 100 mL sterile water infusion  10 mEq Intravenous Q1H Bienvenido Turner DO        sodium chloride (PF) 0.9% PF flush 3 mL  3 mL Intracatheter Q8H Poonam Ramsey PA-C   3 mL at 06/03/24 0242    tamsulosin (FLOMAX) capsule 0.4 mg  0.4 mg Oral At Bedtime Poonam Ramsey PA-C   0.4 mg at 06/02/24 2107     Labs and Imaging results below reviewed today.  Recent Labs   Lab 06/03/24  1005 06/03/24  0656 06/02/24  0547   WBC 4.3 3.9* 13.4*   HGB 12.9* 13.1* 14.6   HCT  38.0* 38.4* 42.2   MCV 94 94 93    168 195     Recent Labs   Lab 06/03/24  0656 06/02/24  0547 06/01/24  1657    141 138   POTASSIUM 3.7 3.8 4.7   CHLORIDE 109* 106 102   CO2 22 22 22   ANIONGAP 11 13 14   * 140* 164*   BUN 32.3* 26.3* 24.7*   CR 0.90 0.70 0.81   GFRESTIMATED 85 >90 88   REN 7.9* 8.2* 8.6*   MAG 2.0 2.0  --    PHOS  --  3.2  --    PROTTOTAL 5.5* 6.1* 6.3*   ALBUMIN 3.1* 3.2* 3.4*   BILITOTAL 1.3* 1.4* 1.4*   ALKPHOS 56 69 77   AST 19 16 10   ALT 12 12 13     Recent Results (from the past 24 hour(s))   XR Abdomen Port 1 View    Narrative    EXAM: XR ABDOMEN PORT 1 VIEW  LOCATION: [facility]  DATE: 6/2/2024    INDICATION: large cecum on CT.  Hoping to reassess size on Xr.  COMPARISON: CT of the abdomen and pelvis 6/1/2024      Impression    IMPRESSION:      There is persistent gas and fluid dilation of large and small bowel, suggesting an adynamic ileus. The third acquired image shows gas distention of the lower cecum which measures around 9.5 cm diameter, unchanged from prior day CT. The gastric tube is   slightly changed in position with tip now in the right upper quadrant in the distal stomach.

## 2024-06-03 NOTE — PROGRESS NOTES
Antimicrobial Stewardship Team Note    Antimicrobial Stewardship Program - A joint venture between [facility] and [facility] [employer] to optimize antibiotic management.     Patient: Eddie Peralta  MRN: [id number]  Allergies: Patient has no known allergies.    Brief Summary: Eddie Peralta is a 84 year old male admitted on 5/31/24 with constipation and abdominal pain following spinal fusion on 5/28 at [facility]. PMH significant for cataracts, arthritis, a fib, GERD, HLD, BPH, and spinal stenosis. CT a/p with mildly dilated ileal small bowel loops and ascending colon with tapering to non-dilated transverse/distal colon consistent with ileus. Worsened over 24-48h, repeat CT with increased dilation of the cecum concerning for early Shadi's. General surgery and GI consulted.    He was started on empiric Zosyn on 6/1 for sepsis. Patient's pain has improved with tap water enema but reports ongoing nausea and distension, continue to monitor with plans for neostigmine if no improvement. WBC count 11.9 on admission, 4.3 today. Afebrile, stable on room air. Blood cultures negative.         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 He was started on Zosyn on 6/1 with imaging negative for infectious source, afebrile. He has completed adequate empiric course, recommend stopping antibiotics at this time.    Recommendations:  Stop Zosyn.    Discussed with ID Staff MD Huyen Santiago, PharmD, BCIDP    Vital Signs/Clinical Features:  Vitals         06/01 0700  06/02 0659 06/02 0700  06/03 0659 06/03 0700  06/03 1252   Most Recent      Temp ( F) 98 -  99.6    97  -  98.8      97.4     97.4 (36.3) 06/03 0830    Pulse 104 -  118    56 -  160    100 -  102     100 06/03 1021    Resp 16 -  18    16 -  24      20     20 06/03 0830    /98 -  172/99    116/75 -  150/97    132/90 -  133/85     133/85 06/03 1021    SpO2 (%) 96 -  98    95 -  98      97     97 06/03 0830            Labs  Estimated Creatinine Clearance: 73.5 mL/min (based on SCr of 0.9 mg/dL).  Recent Labs   Lab Test 05/31/24  0810 06/01/24  1030 06/01/24  1657 06/02/24  0547 06/03/24  0656   CR 0.77 0.77 0.81 0.70 0.90       Recent Labs   Lab Test 05/31/24  0810 06/01/24  1030 06/01/24  1602 06/02/24  0547 06/03/24  0656 06/03/24  1005   WBC 11.9* 13.3*  --  13.4* 3.9* 4.3   HGB 13.7 15.3 14.9 14.6 13.1* 12.9*   HCT 39.7* 44.6  --  42.2 38.4* 38.0*   MCV 92 94  --  93 94 94   * 167  --  195 168 169       Recent Labs   Lab Test 05/31/24  0810 06/01/24  1030 06/01/24  1657 06/02/24  0547 06/03/24  0656   BILITOTAL 1.3* 1.4* 1.4* 1.4* 1.3*   ALKPHOS 79 77 77 69 56   ALBUMIN 3.4* 3.3* 3.4* 3.2* 3.1*   AST 20 17 10 16 19   ALT 14 9 13 12 12       Recent Labs   Lab Test 06/01/24  1237 06/01/24  1602 06/02/24  0734   LACT 2.6* 3.0* 1.7             Culture Results:  7-Day Micro Results       Procedure Component Value Units Date/Time    Blood Culture Arm, Right [[id number]]  (Normal) Collected: 06/01/24 1708    Order Status: Completed Lab Status: Preliminary result Updated: 06/02/24 1846    Specimen: Blood from Arm, Right      Culture No growth after 1 day    Blood Culture Arm, Left [[id number]]  (Normal) Collected: 06/01/24 1657    Order Status: Completed Lab Status: Preliminary result Updated: 06/02/24 1846    Specimen: Blood from Arm, Left      Culture No growth after 1 day            Recent Labs   Lab Test 05/31/24  0916 06/01/24  1818   URINEPH 6.0 5.5   NITRITE Negative Negative   LEUKEST Negative Negative   WBCU <1 1                         Imaging: XR Abdomen Port 1 View    Result Date: 6/2/2024  EXAM:  XR ABDOMEN PORT 1 VIEW LOCATION: [facility] DATE: 6/2/2024 INDICATION: large cecum on CT.  Hoping to reassess size on Xr. COMPARISON: CT of the abdomen and pelvis 6/1/2024     IMPRESSION:  There is persistent gas and fluid dilation of large and small bowel, suggesting an adynamic ileus. The third acquired image shows gas distention of the lower cecum which measures around 9.5 cm diameter, unchanged from prior day CT. The gastric tube is slightly changed in position with tip now in the right upper quadrant in the distal stomach.     CT Abdomen Pelvis w Contrast    Result Date: 6/1/2024  EXAM: CT ABDOMEN PELVIS W CONTRAST LOCATION: [facility] DATE: 6/1/2024 INDICATION: Here with ileus vs SBO.  Clinically worsening despite cares.  Lactic acid climbing.  Rule out intraabdominal pathology, ischemic gut, perforation, etc. COMPARISON: 5/31/2024 TECHNIQUE: CT scan of the abdomen and pelvis was performed following injection of IV contrast. Multiplanar reformats were obtained. Dose reduction techniques were used. CONTRAST: 100mL isovue 370 FINDINGS: LOWER CHEST: Bibasilar atelectasis. HEPATOBILIARY: Small cyst noted within the left hepatic lobe. Liver otherwise unremarkable. Small stones are noted within the gallbladder. PANCREAS: Normal. SPLEEN: Normal. ADRENAL GLANDS: Normal. KIDNEYS/BLADDER: No significant mass, stone, or hydronephrosis. BOWEL: Several dilated loops of small bowel are noted throughout the abdomen and pelvis to the level of the cecum measuring up to 3.9 cm. The cecum is distended measuring up to 9.5 cm. The colon gradually tapers near the sigmoid colon with no obstructing  mass. LYMPH NODES: Normal. VASCULATURE: Mild atherosclerotic disease of the abdominal aorta. PELVIC ORGANS: Bladder within normal limits. MUSCULOSKELETAL: Degenerative changes of spine and hips. Status post surgical fixation of lower lumbar spine.     IMPRESSION: 1.  Several dilated loops of  small bowel are noted throughout the abdomen and pelvis to the level of the cecum measuring up to 3.9 cm. The cecum is distended measuring up to 9.5 cm. The colon gradually tapers near the sigmoid colon with no obstructing mass. Findings are suggestive of ileus, although obstruction also remains a possibility. Recommend surgical consultation. No evidence of ischemia. 2.  Cholelithiasis.     XR Abdomen 2 Views    Result Date: 6/1/2024  EXAM: XR ABDOMEN 2 VIEWS LOCATION: [facility] DATE: 06/01/2024 INDICATION: Follow-up Ileus. See on CT scan from 05/31/2024. COMPARISON: CT 05/31/2024.     IMPRESSION: Interval significant increase of dilated multiple small bowel loops and continued diffuse dilatation of the colon. This is worrisome for progression of diffuse ileus. No free air identified. Spine fusion changes at the lower lumbar level.     XR Abdomen Port 1 View    Result Date: 6/1/2024  EXAM: XR ABDOMEN PORT 1 VIEW LOCATION: [facility] DATE: 6/1/2024 INDICATION: assess NGT placement COMPARISON: Abdominal radiograph earlier the same day and CT abdomen/pelvis 5/31/2024     IMPRESSION: Gastric tube has been placed with tip and sidehole overlying the stomach. Similar gaseous dilatation of the visualized large and small bowel loops. No pneumoperitoneum. Partially visualized lumbar fusion hardware.    CT Abdomen Pelvis w Contrast    Result Date: 5/31/2024  CT ABDOMEN/PELVIS WITH CONTRAST May 31, 2024 9:06 AM CLINICAL HISTORY: Abdominal pain and distention. TECHNIQUE: CT scan of the abdomen and pelvis was performed following injection of IV contrast. Multiplanar reformats were obtained. Dose reduction techniques were used. CONTRAST: 100mL Isovue-370. COMPARISON: None. FINDINGS: LOWER CHEST: Trace bibasilar atelectasis. Tiny hiatal hernia. HEPATOBILIARY: Cholelithiasis. Left hepatic lobe subcentimeter hypodensity is too small to characterize, although most likely benign. PANCREAS:  No significant mass, duct dilatation, or inflammatory change. SPLEEN: Unremarkable. ADRENAL GLANDS: No significant nodules. KIDNEYS: No significant mass, stones, or hydronephrosis. BOWEL: Mildly distended ascending colon (up to 7 cm) and distal ileal small bowel loops (up to 3.5 cm) with tapering to nondilated transverse/distal colon. No discrete transition point. No pneumatosis. No significant wall thickening. VASCULATURE: Mild atherosclerosis. Aortic branch which vessels are grossly patent. LYMPH NODE AND PERITONEUM: No enlarged lymph node. Trace abdominopelvic ascites. No free air. PELVIS: No pelvic mass. MUSCULOSKELETAL: Tiny fat-containing umbilical hernia. Lower lumbar spine fusion hardware. No destructive osseous lesion. OTHER: None.     IMPRESSION: 1.  Mildly dilated distal ileal small bowel loops and ascending colon with tapering to nondilated transverse/distal colon consistent with ileus. No discrete transition point. 2.  Trace abdominopelvic ascites. 3.  Cholelithiasis. CORRIE SANDHU MD   SYSTEM ID:  [id number]    XR Lumbar Spine Port 2/3 Views    Result Date: 5/28/2024  Indication: Spinal localization/hardware assessment. Comparison: None. Technique: Multiple radiographs of the lumbar spine. Findings: A radiograph performed 807 demonstrates a spinal localization device which projects over the L4 spinous process.  Radiographs performed 917 demonstrate placement of L4-5 interbody and dorsal lateral fusion hardware.  Chronic T12/L1 superior endplate deformities are noted.    XR Cervical Spine Port 2/3 Views    Result Date: 5/28/2024  Indication: Spinal localization/hardware assessment. Comparison: None. Technique: Multiple radiographs of the lumbar spine. Findings: A radiograph performed 807 demonstrates a spinal localization device which projects over the L4 spinous process.  Radiographs performed 917 demonstrate placement of L4-5 interbody and dorsal lateral fusion hardware.  Chronic T12/L1  superior endplate deformities are noted.

## 2024-06-03 NOTE — PROGRESS NOTES
"GASTROENTEROLOGY PROGRESS NOTE        SUBJECTIVE:  Watery liquid stool after enemas, passing flatus. No fever, no nausea, no abdominal pain.      OBJECTIVE:    /85 (BP Location: Right arm)   Pulse 96   Temp 97.4  F (36.3  C) (Temporal)   Resp 20   Ht 1.778 m (5' 10\")   Wt 95.8 kg (211 lb 1.6 oz)   SpO2 97%   BMI 30.29 kg/m    Temp (24hrs), Av.1  F (36.7  C), Min:97.4  F (36.3  C), Max:98.8  F (37.1  C)    Patient Vitals for the past 72 hrs:   Weight   24 1048 95.8 kg (211 lb 1.6 oz)   24 1358 93 kg (205 lb 0.4 oz)       Intake/Output Summary (Last 24 hours) at 6/3/2024 1345  Last data filed at 6/3/2024 0550  Gross per 24 hour   Intake 688 ml   Output 2150 ml   Net -1462 ml        PHYSICAL EXAM     Constitutional: NAD, NG    Abdomen: round, soft, largely non tender         Additional Comments:  ROS, FH, SH: See initial GI consult for details.     I have reviewed the patient's new clinical lab results:     Recent Labs   Lab Test 24  1005 24  0656 24  0547   WBC 4.3 3.9* 13.4*   HGB 12.9* 13.1* 14.6   MCV 94 94 93    168 195     Recent Labs   Lab Test 24  0656 24  0547 24  1657   POTASSIUM 3.7 3.8 4.7   CHLORIDE 109* 106 102   CO2 22 22 22   BUN 32.3* 26.3* 24.7*   ANIONGAP 11 13 14     Recent Labs   Lab Test 24  0656 24  0547 24  1818 24  1657 24  1030 24  0916 24  0810   ALBUMIN 3.1* 3.2*  --  3.4*   < >  --  3.4*   BILITOTAL 1.3* 1.4*  --  1.4*   < >  --  1.3*   ALT 12 12  --  13   < >  --  14   AST 19 16  --  10   < >  --  20   PROTEIN  --   --  30*  --   --  Negative  --    LIPASE  --   --   --   --   --   --  20    < > = values in this interval not displayed.       ASSESSMENT/ PLAN  Eddie Peralta is an 84 year old male with a history of atrial fibrillation, GERD, spinal stenosis who presents to the emergency department for evaluation of constipation and abdominal pain following a spinal fusion of L4 " and L5 on 5/28 at [facility]      1. 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 He is improving with passage of flatus. Nausea improved.     -- could consider neostigmine if failing to improving   -- agree with surgery plans for clamping trial. No surgery indicated at this time.       Total time: 35 minutes of total time was spent providing patient care, including patient evaluation, reviewing documentation/test results, and .     Discussed with SHELLY DavidsonC  [facility] ( [facility])

## 2024-06-04 ENCOUNTER — APPOINTMENT (OUTPATIENT)
Dept: PHYSICAL THERAPY | Facility: CLINIC | Age: 84
DRG: 394 | End: 2024-06-04
Payer: MEDICARE

## 2024-06-04 LAB
ANION GAP SERPL CALCULATED.3IONS-SCNC: 13 MMOL/L (ref 7–15)
BUN SERPL-MCNC: 29.2 MG/DL (ref 8–23)
CALCIUM SERPL-MCNC: 7.8 MG/DL (ref 8.8–10.2)
CHLORIDE SERPL-SCNC: 111 MMOL/L (ref 98–107)
CREAT SERPL-MCNC: 0.82 MG/DL (ref 0.67–1.17)
DEPRECATED HCO3 PLAS-SCNC: 18 MMOL/L (ref 22–29)
EGFRCR SERPLBLD CKD-EPI 2021: 88 ML/MIN/1.73M2
GLUCOSE SERPL-MCNC: 87 MG/DL (ref 70–99)
MAGNESIUM SERPL-MCNC: 2 MG/DL (ref 1.7–2.3)
PLATELET # BLD AUTO: 168 10E3/UL (ref 150–450)
POTASSIUM SERPL-SCNC: 3.3 MMOL/L (ref 3.4–5.3)
POTASSIUM SERPL-SCNC: 3.7 MMOL/L (ref 3.4–5.3)
SODIUM SERPL-SCNC: 142 MMOL/L (ref 135–145)
UFH PPP CHRO-ACNC: 0.39 IU/ML
UFH PPP CHRO-ACNC: 0.52 IU/ML
UFH PPP CHRO-ACNC: <0.1 IU/ML

## 2024-06-04 PROCEDURE — 83735 ASSAY OF MAGNESIUM: CPT | Performed by: STUDENT IN AN ORGANIZED HEALTH CARE EDUCATION/TRAINING PROGRAM

## 2024-06-04 PROCEDURE — 120N000001 HC R&B MED SURG/OB

## 2024-06-04 PROCEDURE — 80048 BASIC METABOLIC PNL TOTAL CA: CPT | Performed by: INTERNAL MEDICINE

## 2024-06-04 PROCEDURE — 97530 THERAPEUTIC ACTIVITIES: CPT | Mod: GP

## 2024-06-04 PROCEDURE — 36415 COLL VENOUS BLD VENIPUNCTURE: CPT | Performed by: STUDENT IN AN ORGANIZED HEALTH CARE EDUCATION/TRAINING PROGRAM

## 2024-06-04 PROCEDURE — 250N000013 HC RX MED GY IP 250 OP 250 PS 637: Performed by: STUDENT IN AN ORGANIZED HEALTH CARE EDUCATION/TRAINING PROGRAM

## 2024-06-04 PROCEDURE — 250N000011 HC RX IP 250 OP 636: Performed by: STUDENT IN AN ORGANIZED HEALTH CARE EDUCATION/TRAINING PROGRAM

## 2024-06-04 PROCEDURE — 250N000011 HC RX IP 250 OP 636: Performed by: INTERNAL MEDICINE

## 2024-06-04 PROCEDURE — 99233 SBSQ HOSP IP/OBS HIGH 50: CPT | Performed by: INTERNAL MEDICINE

## 2024-06-04 PROCEDURE — 97116 GAIT TRAINING THERAPY: CPT | Mod: GP

## 2024-06-04 PROCEDURE — 85049 AUTOMATED PLATELET COUNT: CPT | Performed by: STUDENT IN AN ORGANIZED HEALTH CARE EDUCATION/TRAINING PROGRAM

## 2024-06-04 PROCEDURE — C9113 INJ PANTOPRAZOLE SODIUM, VIA: HCPCS | Performed by: STUDENT IN AN ORGANIZED HEALTH CARE EDUCATION/TRAINING PROGRAM

## 2024-06-04 PROCEDURE — 258N000003 HC RX IP 258 OP 636: Performed by: STUDENT IN AN ORGANIZED HEALTH CARE EDUCATION/TRAINING PROGRAM

## 2024-06-04 PROCEDURE — 85520 HEPARIN ASSAY: CPT | Performed by: STUDENT IN AN ORGANIZED HEALTH CARE EDUCATION/TRAINING PROGRAM

## 2024-06-04 PROCEDURE — 250N000013 HC RX MED GY IP 250 OP 250 PS 637: Performed by: PHYSICIAN ASSISTANT

## 2024-06-04 PROCEDURE — 84132 ASSAY OF SERUM POTASSIUM: CPT | Performed by: STUDENT IN AN ORGANIZED HEALTH CARE EDUCATION/TRAINING PROGRAM

## 2024-06-04 RX ORDER — POTASSIUM CHLORIDE 1500 MG/1
20 TABLET, EXTENDED RELEASE ORAL ONCE
Status: COMPLETED | OUTPATIENT
Start: 2024-06-04 | End: 2024-06-04

## 2024-06-04 RX ORDER — MAGNESIUM OXIDE 400 MG/1
400 TABLET ORAL EVERY 4 HOURS
Status: COMPLETED | OUTPATIENT
Start: 2024-06-04 | End: 2024-06-04

## 2024-06-04 RX ORDER — POTASSIUM CHLORIDE 1500 MG/1
40 TABLET, EXTENDED RELEASE ORAL ONCE
Status: COMPLETED | OUTPATIENT
Start: 2024-06-04 | End: 2024-06-04

## 2024-06-04 RX ADMIN — POTASSIUM CHLORIDE 40 MEQ: 1500 TABLET, EXTENDED RELEASE ORAL at 08:58

## 2024-06-04 RX ADMIN — HEPARIN SODIUM 1250 UNITS/HR: 10000 INJECTION, SOLUTION INTRAVENOUS at 05:31

## 2024-06-04 RX ADMIN — POTASSIUM CHLORIDE 20 MEQ: 1500 TABLET, EXTENDED RELEASE ORAL at 17:27

## 2024-06-04 RX ADMIN — SODIUM CHLORIDE: 900 INJECTION INTRAVENOUS at 05:19

## 2024-06-04 RX ADMIN — METOPROLOL TARTRATE 25 MG: 25 TABLET, FILM COATED ORAL at 08:59

## 2024-06-04 RX ADMIN — HEPARIN SODIUM 1550 UNITS/HR: 10000 INJECTION, SOLUTION INTRAVENOUS at 08:53

## 2024-06-04 RX ADMIN — PANTOPRAZOLE SODIUM 40 MG: 40 INJECTION, POWDER, FOR SOLUTION INTRAVENOUS at 21:47

## 2024-06-04 RX ADMIN — PANTOPRAZOLE SODIUM 40 MG: 40 INJECTION, POWDER, FOR SOLUTION INTRAVENOUS at 09:01

## 2024-06-04 RX ADMIN — MAGNESIUM OXIDE TAB 400 MG (241.3 MG ELEMENTAL MG) 400 MG: 400 (241.3 MG) TAB at 08:58

## 2024-06-04 RX ADMIN — TAMSULOSIN HYDROCHLORIDE 0.4 MG: 0.4 CAPSULE ORAL at 21:47

## 2024-06-04 RX ADMIN — SODIUM CHLORIDE: 900 INJECTION INTRAVENOUS at 14:51

## 2024-06-04 RX ADMIN — METOPROLOL TARTRATE 25 MG: 25 TABLET, FILM COATED ORAL at 20:03

## 2024-06-04 RX ADMIN — MAGNESIUM OXIDE TAB 400 MG (241.3 MG ELEMENTAL MG) 400 MG: 400 (241.3 MG) TAB at 12:41

## 2024-06-04 RX ADMIN — HEPARIN SODIUM 1350 UNITS/HR: 10000 INJECTION, SOLUTION INTRAVENOUS at 21:13

## 2024-06-04 ASSESSMENT — ACTIVITIES OF DAILY LIVING (ADL)
ADLS_ACUITY_SCORE: 25
ADLS_ACUITY_SCORE: 46
ADLS_ACUITY_SCORE: 29
ADLS_ACUITY_SCORE: 25
ADLS_ACUITY_SCORE: 29
ADLS_ACUITY_SCORE: 25
ADLS_ACUITY_SCORE: 25
ADLS_ACUITY_SCORE: 29
ADLS_ACUITY_SCORE: 29
ADLS_ACUITY_SCORE: 25

## 2024-06-04 NOTE — PROGRESS NOTES
"GASTROENTEROLOGY PROGRESS NOTE        SUBJECTIVE:  Having watery bowel movements every 3 hours or so, passing flatus. No fever, no n/v. Endorses some slight discomfort in his lower abdomen that is new since yesterday.      OBJECTIVE:  /83 (BP Location: Left arm)   Pulse 94   Temp 97.8  F (36.6  C) (Temporal)   Resp 18   Ht 1.778 m (5' 10\")   Wt 95.8 kg (211 lb 1.6 oz)   SpO2 97%   BMI 30.29 kg/m    Temp (24hrs), Av  F (36.7  C), Min:97.5  F (36.4  C), Max:98.7  F (37.1  C)    Patient Vitals for the past 72 hrs:   Weight   24 1048 95.8 kg (211 lb 1.6 oz)       Intake/Output Summary (Last 24 hours) at 2024 0851  Last data filed at 2024 0519  Gross per 24 hour   Intake 1795 ml   Output --   Net 1795 ml        PHYSICAL EXAM  GENERAL: Pleasant, no obvious distress  NECK: Supple without adenopathy  EYES: No scleral icterus  ABDOMEN: Round. Soft, non-tender, no guarding/rebound/mass.  MUSKULOSKELETAL:  Warm and well perfused, moves all extremities well  SKIN: No jaundice  NEUROLOGIC: Alert and oriented  PSYCHIATRIC: Normal affect     Additional Comments:  ROS, FH, SH: See initial GI consult for details.     I have reviewed the patient's new clinical lab results:     Recent Labs   Lab Test 24  0652 24  1005 24  0656 24  0547   WBC  --  4.3 3.9* 13.4*   HGB  --  12.9* 13.1* 14.6   MCV  --  94 94 93    169 168 195     Recent Labs   Lab Test 24  0652 24  0656 24  0547   POTASSIUM 3.3* 3.7 3.8   CHLORIDE 111* 109* 106   CO2 18* 22 22   BUN 29.2* 32.3* 26.3*   ANIONGAP 13 11 13     Recent Labs   Lab Test 24  0656 24  0547 24  1818 24  1657 24  1030 24  0916 24  0810   ALBUMIN 3.1* 3.2*  --  3.4*   < >  --  3.4*   BILITOTAL 1.3* 1.4*  --  1.4*   < >  --  1.3*   ALT 12 12  --  13   < >  --  14   AST 19 16  --  10   < >  --  20   PROTEIN  --   --  30*  --   --  Negative  --    LIPASE  --   --   --   --   --   -- "  20    < > = values in this interval not displayed.       [id number] Abdominal X-ray  IMPRESSION:    There is persistent gas and fluid dilation of large and small bowel, suggesting an adynamic ileus. The third acquired image shows gas distention of the lower cecum which measures around 9.5 cm diameter, unchanged from prior day CT. The gastric tube is   slightly changed in position with tip now in the right upper quadrant in the distal stomach.     ASSESSMENT/PLAN:  84 year old male with history of atrial fibrillation, GERD, spinal stenosis who presents to the ED for evaluation of constipation and abdominal pain following a spinal fusion of L4 and L5 on  at [facility].     Patient passing flatus and watery stools every 3 hours. Notes some lower abdominal tenderness, rating a 1 out of 10, that is new since yesterday. Abdomen is still distended, no nausea/vomiting. Surgery is following and no surgery plans. NG tube successfully removed after clamping trial. Patient advanced to clear liquid diet.      1. Post-op ileus vs. Streator syndrome  - Will consider repeating imaging if patient's lower abdominal pain worsens  - Encourage ambulation  - Continue clear liquid diet per surgery  - Could consider neostigmine if failing to improve    Discussed patient findings and plan with Dr. Elizondo.     Total time: 25 minutes of total time was spent providing patient care, including patient evaluation, reviewing documentation/test results, and .     Shannon Marcial PA-C  [facility] ([facility])

## 2024-06-04 NOTE — PLAN OF CARE
Goal Outcome Evaluation:      Plan of Care Reviewed With: patient, family      Overall Patient Progress: improvingOverall     Outcome Evaluation: tolerated shower today. walking in rob with family. PT assessed pt. up SBA GB and Walker  Pt up with assist of son walking in halls this afternoon and evening.  Outcome Evaluation:   Heparin drip bolus given at 0847 of 5,750 units. Then drip increased to 1550 units/hr.  Hep Ax redraw at 1349 0.52 hep stopped for one hour and restarted with decrease to 1350 units. Currently at 1350 units/hr. Recheck at 2255  Up SBA 1 GB/walker/IV pole.brace on when OOB.  PT assessed pt and said ok for family to walk with pt in rob. Tolerated shower. PIV NS at 100ml/hr. IV Zosyn discontinued. Pt having loose BMs. Denies pain with his back. Declines scheduled Tylenol.   Potassium 3.3 and Mg 2.0 replacement given. Recheck in am for Mg and potassium recheck at 1330 3.7 next will be tomorrow am.  Tolerated full liquids. Denies N/V Abd fullness      Problem: Adult Inpatient Plan of Care  Goal: Plan of Care Review  Description: The Plan of Care Review/Shift note should be completed every shift.  The Outcome Evaluation is a brief statement about your assessment that the patient is improving, declining, or no change.  This information will be displayed automatically on your shift  note.  6/4/2024 1405 by Sarah Sanches RN  Outcome: Progressing  6/4/2024 1313 by Sarah Sanches RN  Outcome: Progressing  Flowsheets (Taken 6/4/2024 1313)  Plan of Care Reviewed With:   patient   family  6/4/2024 1309 by Sarah Sanches RN  Outcome: Progressing  Flowsheets (Taken 6/4/2024 1309)  Outcome Evaluation: tolerated shower today. walking in rob with family. PT assessed pt. up SBA GB and Walker  Plan of Care Reviewed With:   patient   family  Goal: Patient-Specific Goal (Individualized)  Description: You can add care plan individualizations to a care plan. Examples of Individualization might be:   "\"Parent requests to be called daily at 9am for status\", \"I have a hard time hearing out of my right ear\", or \"Do not touch me to wake me up as it startles  me\".  6/4/2024 1405 by Sarah Sanches RN  Outcome: Progressing  6/4/2024 1313 by Sarah Sanches RN  Outcome: Progressing  6/4/2024 1309 by Sarah Sanches RN  Outcome: Progressing  Goal: Absence of Hospital-Acquired Illness or Injury  6/4/2024 1405 by Sarah Sanches RN  Outcome: Progressing  6/4/2024 1313 by Sarah Sanches RN  Outcome: Progressing  6/4/2024 1309 by Sarah Sanches RN  Outcome: Progressing  Intervention: Identify and Manage Fall Risk  Recent Flowsheet Documentation  Taken 6/4/2024 0925 by Sarah Sanches RN  Safety Promotion/Fall Prevention:   activity supervised   assistive device/personal items within reach   clutter free environment maintained   lighting adjusted   mobility aid in reach   nonskid shoes/slippers when out of bed   patient and family education   safety round/check completed   supervised activity  Intervention: Prevent Skin Injury  Recent Flowsheet Documentation  Taken 6/4/2024 0925 by Sarah Sanches RN  Body Position: supine, head elevated  Skin Protection:   adhesive use limited   transparent dressing maintained  Device Skin Pressure Protection:   absorbent pad utilized/changed   tubing/devices free from skin contact   adhesive use limited  Intervention: Prevent and Manage VTE (Venous Thromboembolism) Risk  Recent Flowsheet Documentation  Taken 6/4/2024 0925 by Sarah Sanches RN  VTE Prevention/Management: SCDs (sequential compression devices) off  Intervention: Prevent Infection  Recent Flowsheet Documentation  Taken 6/4/2024 0925 by Sarah Sanches RN  Infection Prevention:   rest/sleep promoted   hand hygiene promoted   environmental surveillance performed  Goal: Optimal Comfort and Wellbeing  6/4/2024 1405 by Sarah Sanches RN  Outcome: Progressing  6/4/2024 1313 by Myron" Sarah SHANKS RN  Outcome: Progressing  6/4/2024 1309 by Sarah Sanches RN  Outcome: Progressing  Goal: Readiness for Transition of Care  6/4/2024 1405 by Sarah Sanches RN  Outcome: Progressing  6/4/2024 1313 by Sarah Sanches RN  Outcome: Progressing  6/4/2024 1309 by Sarah Sanches RN  Outcome: Progressing     Problem: Intestinal Obstruction  Goal: Optimal Bowel Function  6/4/2024 1405 by Sarah Sanches RN  Outcome: Progressing  6/4/2024 1313 by Sarah Sanches RN  Outcome: Progressing  6/4/2024 1309 by Sarah Sanches RN  Outcome: Progressing  Intervention: Promote Bowel Function  Recent Flowsheet Documentation  Taken 6/4/2024 0925 by Sarah Sanches RN  Body Position: supine, head elevated  Head of Bed (HOB) Positioning: HOB at 20-30 degrees  Positioning/Transfer Devices:   pillows   in use  Goal: Fluid and Electrolyte Balance  6/4/2024 1405 by Sarah Sanches RN  Outcome: Progressing  6/4/2024 1313 by Sarah Sanches RN  Outcome: Progressing  6/4/2024 1309 by Sarah Sanches RN  Outcome: Progressing  Intervention: Monitor and Manage Hypovolemia  Recent Flowsheet Documentation  Taken 6/4/2024 0925 by Sarah Sanches RN  Fluid/Electrolyte Management:   fluids provided   electrolyte supplement adjusted  Goal: Absence of Infection Signs and Symptoms  6/4/2024 1405 by Sarah Sanches RN  Outcome: Progressing  6/4/2024 1313 by Sarah Sanches RN  Outcome: Progressing  6/4/2024 1309 by Sarah Sanches RN  Outcome: Progressing  Intervention: Prevent or Manage Infection  Recent Flowsheet Documentation  Taken 6/4/2024 0925 by Sarah Sanches RN  Infection Management: aseptic technique maintained  Goal: Optimize Nutrition Status  6/4/2024 1405 by Sarah Sanches RN  Outcome: Progressing  6/4/2024 1313 by Myron, Sarah A, RN  Outcome: Progressing  6/4/2024 1309 by Sarah Sanches, RN  Outcome: Progressing  Goal: Optimal Pain Control and  Function  6/4/2024 1405 by Sarah Sanches RN  Outcome: Progressing  6/4/2024 1313 by Sarah Sanches RN  Outcome: Progressing  6/4/2024 1309 by Sarah Sanches RN  Outcome: Progressing     Problem: Skin Injury Risk Increased  Goal: Skin Health and Integrity  6/4/2024 1405 by Sarah Sanches RN  Outcome: Progressing  6/4/2024 1313 by Sarah Sanches RN  Outcome: Progressing  6/4/2024 1309 by Sarah Sanches RN  Outcome: Progressing  Intervention: Plan: Nurse Driven Intervention: Moisture Management  Recent Flowsheet Documentation  Taken 6/4/2024 0925 by Sarah Sanches RN  Plan: Moisture Management: encourage regular toileting  Intervention: Plan: Nurse Driven Intervention: Friction and Shear  Recent Flowsheet Documentation  Taken 6/4/2024 0925 by Sarah Sanches RN  Friction/Shear Interventions: HOB 30 degrees or less  Intervention: Optimize Skin Protection  Recent Flowsheet Documentation  Taken 6/4/2024 0925 by Sarah Sanches RN  Pressure Reduction Techniques: frequent weight shift encouraged  Skin Protection:   adhesive use limited   transparent dressing maintained  Activity Management: activity encouraged  Head of Bed (HOB) Positioning: HOB at 20-30 degrees

## 2024-06-04 NOTE — PROGRESS NOTES
[facility]  Medicine Progress Note - Hospitalist Service  Date of Admission:  5/31/2024    Assessment & Plan   Eddie Peralta is a 84 year old male with a history of Cataract, Arthritis, Atrial Fibrillation, GERD, HLD, BPH, DDD, Spinal Stenosis who presents to the emergency department for evaluation of constipation and abdominal pain following a spinal fusion of L4 and L5 on 5/28 at [facility].      CT A/P on presentation showed mildly dilated ileal small bowel loops and ascending colon with tapering to non-dilated transverse/distal colon consistent with ileus.  He was monitored but unfortunately worsened over 24-48 hours.  XR showed worsened dilation of small bowel loops and lactic climbed.  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      General surgery & [facility] are following.  Patient has had some relief with tap water enema but remains very nauseated and distended.  Hospital course complicated by afib with RVR.        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  Last BM on 5/29.  Using Oxycodone for pain control after his spine surgery on 5/28.  On arrival, CT abd/pelvis reveals a mildly dilated distal ileal small bowel loops and ascending colon with tapering to nondilated transverse/distal colon consistent with ileus.  No discrete transition point.  Abd XR on AM 6/1 shows worsening.  Repeat CT on 6/1 shows increasingly distended cecum - now at 9.5 cm.  Discussed with surgery & [facility] - consideration for early shadi's (although cecum is generally >12 cm with this).  GI recommended tap water enema which helped with abdominal pain but patient has ongoing nausea and abdominal distention.   -General surgery & [facility] following, appreciate recs  -If patient worsens, will need to further consider neostigmine  -Continue gentle IV fluid hydration, liquid diet, analgesics prn; try to avoid  narcotics  -For pain control, the patient has IV dilaudid, scheduled tylenol  -Continue PPI.    Today.  -Patient tolerated liquid diet.  -Has multiple bowel movements, in setting of recent enemas.  -Abdominal distention is improving.  -No nausea or vomiting.  -Continue to monitor off antibiotics.  -IV fluids, gentle hydration.  -Avoid opioids as much as possible.      Chronic Afib with RVR episode during this hospital stay:  -Diagnosed 2022 s/p Cardioversion 7/2022 with recurrence shortly after.    -No hx of obstructive CAD or CHF per last Cardiology note 5/2023.    -Currently getting IV fluids, noted he was on as needed Lasix for edema.  -Not on any medication for rate control.  -Early during this hospitalization, his HR was well controlled.    -Unfortunately, this has slowly increased in the setting of pain/nausea and then jumped to 150-170s after ambulating on 6/2.  Cardiac telemetry is consistent with afib.  -metoprolol tartrate 25 mg BID  -K goal >4, Mag goal >2  -Per family, Eliquis was planned to be resumed 6/1/24 per his spine surgery team, on hold due to n.p.o. status and bowel obstruction.  -CHADSVasc score in range to anticoagulate but lower range.   - It was to resume post-op 6/1 but given concern for possible GI surgery/NPO status it has not yet resumed.   -Currently on heparin drip, will continue to monitor on heparin drip for now.  -Will consider starting DOAC if bowel obstruction is completely resolved within normal anticipated plan for surgery.      Lumbar Spinal Stenosis  S/p Transfacet/Transforaminal L4 to: L5 Posterior Spine Fusion with Instrumentation L4 to: L5 Transforaminal Lumbar Interbody Fusion L4 to: L5 on 5/28/24 at [facility]   -Continue brace and post op lifting and bending restrictions  -PT/SW consult      HLD  - resume pta Atorvastatin when taking po     GERD  Hx Conti's Esophagus  -Continue IV Protonix, will resume his PTA dose when oral intake improves     BPH  -Continue Flomax as able  "with GI issues and monitor PVR    PPE Used:  Mask        Diet: Full Liquid Diet    DVT Prophylaxis: Heparin drip  Tse Catheter: Not present  Lines: None     Cardiac Monitoring: ACTIVE order. Indication: Tachyarrhythmias, acute (48 hours)  Code Status: Full Code      Clinically Significant Risk Factors        # Hypokalemia: Lowest K = 3.3 mmol/L in last 2 days, will replace as needed       # Hypoalbuminemia: Lowest albumin = 3.1 g/dL at 6/3/2024  6:56 AM, will monitor as appropriate            # Obesity: Estimated body mass index is 30.29 kg/m  as calculated from the following:    Height as of this encounter: 1.778 m (5' 10\").    Weight as of this encounter: 95.8 kg (211 lb 1.6 oz).             Disposition Plan     Medically Ready for Discharge: Expect in the hospital 2-3 more days, pending improvement in bowel function and when he tolerates oral intake         I discussed with patient, with his significant other and also with his son at bedside.  All their question and concerns addressed.  Kodi Lowe MD  Hospitalist Service  [facility]  Securely message with [employer] (more info)  Text page via [facility] Paging/Directory   ______________________________________________________________________    Interval History   Patient seen and examined, assumed care today, H&P, labs, medications reviewed.  His significant other and his son at bedside.  Abdomen is still distended, stated is improving, no nausea or vomiting, tolerating oral intake.  He has multiple small bowel movements, mostly is liquid.  He feels hungry, appetite improving.    Physical Exam   Vital Signs: Temp: 97.8  F (36.6  C) Temp src: Temporal BP: 100/53 Pulse: 63   Resp: 18 SpO2: 97 % O2 Device: None (Room air)    Weight: 211 lbs 1.6 oz    GEN:  Alert, oriented, appears ill but comfortable, no overt distress.  HEENT:  Normocephalic/atraumatic, no scleral icterus, no nasal discharge, mouth moist.  CV:  Regular rate and rhythm, " no murmur or JVD.  S1 + S2 noted, no S3 or S4.  LUNGS:  Clear to auscultation bilaterally without rales/rhonchi/wheezing/retractions.  Mildly decreased breath sounds bases.  Symmetric chest rise on inhalation noted.  ABD:  Hypoactive bowel sounds, soft, non-tender,  moderately distended.  No guarding.  EXT:  Trace edema.  No cyanosis.  No new joint synovitis noted.  SKIN:  Dry to touch, no new exanthems noted in the visualized areas.    Medical Decision Making       Over 55 MINUTES SPENT BY ME on the date of service doing chart review, history, exam, documentation & further activities per the note.      Data   Medications   Current Facility-Administered Medications   Medication Dose Route Frequency Provider Last Rate Last Admin    heparin 25,000 units in 0.45% NaCl 250 mL ANTICOAGULANT infusion  0-5,000 Units/hr Intravenous Continuous Pan Max DO 15.5 mL/hr at 06/04/24 0853 1,550 Units/hr at 06/04/24 0853    sodium chloride 0.9 % infusion   Intravenous Continuous Bienvenido Turner  mL/hr at 06/04/24 0908 Rate Verify at 06/04/24 0908     Current Facility-Administered Medications   Medication Dose Route Frequency Provider Last Rate Last Admin    acetaminophen (TYLENOL) tablet 975 mg  975 mg Oral Q8H Bienvenido Turner, DO   975 mg at 06/03/24 0016    Or    acetaminophen (TYLENOL) Suppository 650 mg  650 mg Rectal Q8H Johnny Bienvenido, DO        metoprolol tartrate (LOPRESSOR) tablet 25 mg  25 mg Oral BID Bienvenido Turner DO   25 mg at 06/04/24 0859    pantoprazole (PROTONIX) IV push injection 40 mg  40 mg Intravenous Q12H Bienvenido Turner, DO   40 mg at 06/04/24 0901    sodium chloride (PF) 0.9% PF flush 3 mL  3 mL Intracatheter Q8H Poonam Ramsey PA-C   3 mL at 06/03/24 0242    tamsulosin (FLOMAX) capsule 0.4 mg  0.4 mg Oral At Bedtime Poonam Ramsey PA-C   0.4 mg at 06/03/24 2131     Labs and Imaging results below reviewed today.  Recent Labs   Lab 06/04/24  0652 06/03/24  1005 06/03/24  0656  06/02/24  0547   WBC  --  4.3 3.9* 13.4*   HGB  --  12.9* 13.1* 14.6   HCT  --  38.0* 38.4* 42.2   MCV  --  94 94 93    169 168 195     Recent Labs   Lab 06/04/24  0652 06/03/24  0656 06/02/24  0547 06/01/24  1657    142 141 138   POTASSIUM 3.3* 3.7 3.8 4.7   CHLORIDE 111* 109* 106 102   CO2 18* 22 22 22   ANIONGAP 13 11 13 14   GLC 87 106* 140* 164*   BUN 29.2* 32.3* 26.3* 24.7*   CR 0.82 0.90 0.70 0.81   GFRESTIMATED 88 85 >90 88   REN 7.8* 7.9* 8.2* 8.6*   MAG 2.0 2.0 2.0  --    PHOS  --   --  3.2  --    PROTTOTAL  --  5.5* 6.1* 6.3*   ALBUMIN  --  3.1* 3.2* 3.4*   BILITOTAL  --  1.3* 1.4* 1.4*   ALKPHOS  --  56 69 77   AST  --  19 16 10   ALT  --  12 12 13     No results found for this or any previous visit (from the past 24 hour(s)).

## 2024-06-04 NOTE — PLAN OF CARE
"Goal Outcome Evaluation:    Plan of Care Reviewed With: patient, spouse    Overall Patient Progress: improving    Outcome Evaluation: NG removed around 2000. Clear liquit diet tolerated. Denies pain. Loose BMs charted overnight. PIV running NS at 100ml/hr. Heparin gtts running on the 2nd PIV at 12.5ml/hr. K+ and Mg replaced yesterday. On tele. Room air.    Problem: Adult Inpatient Plan of Care  Goal: Plan of Care Review  Description: The Plan of Care Review/Shift note should be completed every shift.  The Outcome Evaluation is a brief statement about your assessment that the patient is improving, declining, or no change.  This information will be displayed automatically on your shift  note.  Outcome: Progressing  Flowsheets (Taken 6/4/2024 0144)  Outcome Evaluation: NG removed around 2000. Clear liquit diet tolerated. Denies pain. Loose BMs charted overnight. PIV running NS at 100ml/hr. Heparin gtts running on the 2nd PIV at 12.5ml/hr. K+ and Mg replaced yesterday. On tele. Room air.  Plan of Care Reviewed With:   patient   spouse  Overall Patient Progress: improving  Goal: Patient-Specific Goal (Individualized)  Description: You can add care plan individualizations to a care plan. Examples of Individualization might be:  \"Parent requests to be called daily at 9am for status\", \"I have a hard time hearing out of my right ear\", or \"Do not touch me to wake me up as it startles  me\".  Outcome: Progressing  Goal: Absence of Hospital-Acquired Illness or Injury  Outcome: Progressing  Intervention: Identify and Manage Fall Risk  Recent Flowsheet Documentation  Taken 6/4/2024 0032 by Naman Villegas RN  Safety Promotion/Fall Prevention: safety round/check completed  Intervention: Prevent Skin Injury  Recent Flowsheet Documentation  Taken 6/4/2024 0032 by Naman Villegas RN  Body Position:   right   side-lying 30 degrees  Skin Protection:   adhesive use limited   transparent dressing maintained  Device Skin Pressure " Protection: absorbent pad utilized/changed  Intervention: Prevent and Manage VTE (Venous Thromboembolism) Risk  Recent Flowsheet Documentation  Taken 6/4/2024 0032 by Naman Villegas RN  VTE Prevention/Management: SCDs (sequential compression devices) on  Intervention: Prevent Infection  Recent Flowsheet Documentation  Taken 6/4/2024 0032 by Naman Villegas RN  Infection Prevention:   rest/sleep promoted   single patient room provided   equipment surfaces disinfected   hand hygiene promoted  Goal: Optimal Comfort and Wellbeing  Outcome: Progressing  Goal: Readiness for Transition of Care  Outcome: Progressing

## 2024-06-04 NOTE — PROGRESS NOTES
"[facility]   General Surgery Progress Note           Assessment and Plan:   Assessment:   Ileus vs possible [place] syndrome following recent spine surgery   - low potassium, on replacement protocol         Plan:   -full liquid diet  -GI following - consider neostigmine if failing to improve   -no surgical intervention at this time  Seen and agree; is much improved but still bloated; okay to trial full liquids.         Interval History:   Pt sitting at edge of bed, getting ready for walk with PT.  Mild discomfort in lower abdomen.  Having loose stools about every 3 hours.  Passing gas.  Feeling hungry.         Physical Exam:   Blood pressure 100/53, pulse 63, temperature 97.8  F (36.6  C), temperature source Temporal, resp. rate 18, height 1.778 m (5' 10\"), weight 95.8 kg (211 lb 1.6 oz), SpO2 97%.    I/O last 3 completed shifts:  In: 1795 [I.V.:1795]  Out: -     Abdomen:   soft, rounded, non tender          Data:   Data   Recent Labs   Lab 06/04/24  0652 06/03/24  1005 06/03/24  0656 06/02/24  0547   WBC  --  4.3 3.9* 13.4*   HGB  --  12.9* 13.1* 14.6   MCV  --  94 94 93    169 168 195     --  142 141   POTASSIUM 3.3*  --  3.7 3.8   CHLORIDE 111*  --  109* 106   CO2 18*  --  22 22   BUN 29.2*  --  32.3* 26.3*   CR 0.82  --  0.90 0.70   ANIONGAP 13  --  11 13   REN 7.8*  --  7.9* 8.2*   GLC 87  --  106* 140*   ALBUMIN  --   --  3.1* 3.2*   PROTTOTAL  --   --  5.5* 6.1*   BILITOTAL  --   --  1.3* 1.4*   ALKPHOS  --   --  56 69   ALT  --   --  12 12   AST  --   --  19 16           Liz Thurston PA-C     "

## 2024-06-04 NOTE — PLAN OF CARE
Physical Therapy Discharge Summary    Reason for therapy discharge:    All goals and outcomes met, no further needs identified.    Progress towards therapy goal(s). See goals on Care Plan in Epic electronic health record for goal details.  Goals met    Therapy recommendation(s):    Patient progressing to mod I when ambulating with FWW, SBA with IV pole. Patient tolerating >500 ft ambulation today. Encouraged continued ambulation while hospitalized, okay'd to ambulate with family assist. Patient reports family to assist at discharge as well, has access to FWW and SPC.

## 2024-06-05 LAB
MAGNESIUM SERPL-MCNC: 1.8 MG/DL (ref 1.7–2.3)
POTASSIUM SERPL-SCNC: 3.1 MMOL/L (ref 3.4–5.3)
POTASSIUM SERPL-SCNC: 3.2 MMOL/L (ref 3.4–5.3)
POTASSIUM SERPL-SCNC: 3.2 MMOL/L (ref 3.4–5.3)
UFH PPP CHRO-ACNC: 0.18 IU/ML
UFH PPP CHRO-ACNC: 0.42 IU/ML

## 2024-06-05 PROCEDURE — 85520 HEPARIN ASSAY: CPT | Performed by: STUDENT IN AN ORGANIZED HEALTH CARE EDUCATION/TRAINING PROGRAM

## 2024-06-05 PROCEDURE — 36415 COLL VENOUS BLD VENIPUNCTURE: CPT | Performed by: STUDENT IN AN ORGANIZED HEALTH CARE EDUCATION/TRAINING PROGRAM

## 2024-06-05 PROCEDURE — 99232 SBSQ HOSP IP/OBS MODERATE 35: CPT | Performed by: INTERNAL MEDICINE

## 2024-06-05 PROCEDURE — 250N000013 HC RX MED GY IP 250 OP 250 PS 637: Performed by: STUDENT IN AN ORGANIZED HEALTH CARE EDUCATION/TRAINING PROGRAM

## 2024-06-05 PROCEDURE — 99231 SBSQ HOSP IP/OBS SF/LOW 25: CPT | Mod: FS | Performed by: SURGERY

## 2024-06-05 PROCEDURE — 84132 ASSAY OF SERUM POTASSIUM: CPT | Performed by: STUDENT IN AN ORGANIZED HEALTH CARE EDUCATION/TRAINING PROGRAM

## 2024-06-05 PROCEDURE — 120N000001 HC R&B MED SURG/OB

## 2024-06-05 PROCEDURE — 250N000013 HC RX MED GY IP 250 OP 250 PS 637: Performed by: INTERNAL MEDICINE

## 2024-06-05 PROCEDURE — 83735 ASSAY OF MAGNESIUM: CPT | Performed by: STUDENT IN AN ORGANIZED HEALTH CARE EDUCATION/TRAINING PROGRAM

## 2024-06-05 PROCEDURE — 258N000003 HC RX IP 258 OP 636: Performed by: STUDENT IN AN ORGANIZED HEALTH CARE EDUCATION/TRAINING PROGRAM

## 2024-06-05 PROCEDURE — C9113 INJ PANTOPRAZOLE SODIUM, VIA: HCPCS | Performed by: STUDENT IN AN ORGANIZED HEALTH CARE EDUCATION/TRAINING PROGRAM

## 2024-06-05 PROCEDURE — 258N000003 HC RX IP 258 OP 636: Performed by: INTERNAL MEDICINE

## 2024-06-05 PROCEDURE — 250N000011 HC RX IP 250 OP 636: Performed by: STUDENT IN AN ORGANIZED HEALTH CARE EDUCATION/TRAINING PROGRAM

## 2024-06-05 PROCEDURE — 250N000013 HC RX MED GY IP 250 OP 250 PS 637: Performed by: PHYSICIAN ASSISTANT

## 2024-06-05 RX ORDER — MAGNESIUM OXIDE 400 MG/1
400 TABLET ORAL EVERY 4 HOURS
Status: COMPLETED | OUTPATIENT
Start: 2024-06-05 | End: 2024-06-05

## 2024-06-05 RX ORDER — POTASSIUM CHLORIDE 1500 MG/1
40 TABLET, EXTENDED RELEASE ORAL ONCE
Status: COMPLETED | OUTPATIENT
Start: 2024-06-05 | End: 2024-06-05

## 2024-06-05 RX ADMIN — POTASSIUM CHLORIDE 40 MEQ: 1500 TABLET, EXTENDED RELEASE ORAL at 08:54

## 2024-06-05 RX ADMIN — Medication 400 MG: at 08:54

## 2024-06-05 RX ADMIN — APIXABAN 5 MG: 5 TABLET, FILM COATED ORAL at 11:56

## 2024-06-05 RX ADMIN — METOPROLOL TARTRATE 25 MG: 25 TABLET, FILM COATED ORAL at 08:52

## 2024-06-05 RX ADMIN — POTASSIUM CHLORIDE 40 MEQ: 1500 TABLET, EXTENDED RELEASE ORAL at 17:27

## 2024-06-05 RX ADMIN — SODIUM CHLORIDE: 900 INJECTION INTRAVENOUS at 21:38

## 2024-06-05 RX ADMIN — APIXABAN 5 MG: 5 TABLET, FILM COATED ORAL at 20:08

## 2024-06-05 RX ADMIN — POTASSIUM CHLORIDE 40 MEQ: 1500 TABLET, EXTENDED RELEASE ORAL at 21:32

## 2024-06-05 RX ADMIN — PANTOPRAZOLE SODIUM 40 MG: 40 INJECTION, POWDER, FOR SOLUTION INTRAVENOUS at 21:32

## 2024-06-05 RX ADMIN — SODIUM CHLORIDE: 900 INJECTION INTRAVENOUS at 17:28

## 2024-06-05 RX ADMIN — METOPROLOL TARTRATE 25 MG: 25 TABLET, FILM COATED ORAL at 20:08

## 2024-06-05 RX ADMIN — SODIUM CHLORIDE: 900 INJECTION INTRAVENOUS at 10:05

## 2024-06-05 RX ADMIN — Medication 400 MG: at 11:56

## 2024-06-05 RX ADMIN — SODIUM CHLORIDE: 900 INJECTION INTRAVENOUS at 00:29

## 2024-06-05 RX ADMIN — TAMSULOSIN HYDROCHLORIDE 0.4 MG: 0.4 CAPSULE ORAL at 21:32

## 2024-06-05 RX ADMIN — PANTOPRAZOLE SODIUM 40 MG: 40 INJECTION, POWDER, FOR SOLUTION INTRAVENOUS at 08:52

## 2024-06-05 ASSESSMENT — ACTIVITIES OF DAILY LIVING (ADL)
ADLS_ACUITY_SCORE: 26
ADLS_ACUITY_SCORE: 25
ADLS_ACUITY_SCORE: 26
ADLS_ACUITY_SCORE: 25
ADLS_ACUITY_SCORE: 25
ADLS_ACUITY_SCORE: 26
ADLS_ACUITY_SCORE: 25
ADLS_ACUITY_SCORE: 26
ADLS_ACUITY_SCORE: 25
ADLS_ACUITY_SCORE: 26
ADLS_ACUITY_SCORE: 26
ADLS_ACUITY_SCORE: 25
ADLS_ACUITY_SCORE: 25
ADLS_ACUITY_SCORE: 26
ADLS_ACUITY_SCORE: 25

## 2024-06-05 NOTE — PROGRESS NOTES
"GASTROENTEROLOGY PROGRESS NOTE        SUBJECTIVE:  Patient sitting upright in chair. Son at bedside. Patient feels well. Denies fever, n/v, abdominal pain. Feels abdominal distension is improved since yesterday. Has been ambulating frequently. He is passing frequent loose stools, about 15 within last 24 hours. Denies hematochezia or melena.      OBJECTIVE:  /82 (BP Location: Left arm)   Pulse 88   Temp 97.9  F (36.6  C) (Temporal)   Resp 18   Ht 1.778 m (5' 10\")   Wt 95.8 kg (211 lb 1.6 oz)   SpO2 98%   BMI 30.29 kg/m    Temp (24hrs), Av.9  F (36.6  C), Min:97.7  F (36.5  C), Max:98.1  F (36.7  C)    Patient Vitals for the past 72 hrs:   Weight   24 1048 95.8 kg (211 lb 1.6 oz)       Intake/Output Summary (Last 24 hours) at 2024 1227  Last data filed at 2024 0029  Gross per 24 hour   Intake 1924 ml   Output --   Net 1924 ml        PHYSICAL EXAM  GENERAL: Pleasant, no obvious distress  NECK: Supple without adenopathy  EYES: No scleral icterus  ABDOMEN: Round. Soft, non-tender, no guarding/rebound/mass.  MUSKULOSKELETAL:  Warm and well perfused, moves all extremities well  SKIN: No jaundice  NEUROLOGIC: Alert and oriented  PSYCHIATRIC: Normal affect     Additional Comments:  ROS, FH, SH: See initial GI consult for details.     I have reviewed the patient's new clinical lab results:     Recent Labs   Lab Test 24  0652 24  1005 24  0656 24  0547   WBC  --  4.3 3.9* 13.4*   HGB  --  12.9* 13.1* 14.6   MCV  --  94 94 93    169 168 195     Recent Labs   Lab Test 24  0632 24  1511 24  0652 24  0656 24  0547   POTASSIUM 3.1* 3.7 3.3* 3.7 3.8   CHLORIDE  --   --  111* 109* 106   CO2  --   --  18* 22 22   BUN  --   --  29.2* 32.3* 26.3*   ANIONGAP  --   --  13 11 13     Recent Labs   Lab Test 24  0656 24  0547 24  1818 24  1657 24  1030 24  0916 24  0810   ALBUMIN 3.1* 3.2*  --  3.4*   < >  --  " 3.4*   BILITOTAL 1.3* 1.4*  --  1.4*   < >  --  1.3*   ALT 12 12  --  13   < >  --  14   AST 19 16  --  10   < >  --  20   PROTEIN  --   --  30*  --   --  Negative  --    LIPASE  --   --   --   --   --   --  20    < > = values in this interval not displayed.       [id number] Abdominal X-ray  IMPRESSION:    There is persistent gas and fluid dilation of large and small bowel, suggesting an adynamic ileus. The third acquired image shows gas distention of the lower cecum which measures around 9.5 cm diameter, unchanged from prior day CT. The gastric tube is   slightly changed in position with tip now in the right upper quadrant in the distal stomach.     ASSESSMENT:  84 year old male with history of atrial fibrillation, GERD, spinal stenosis who presents to the ED for evaluation of constipation and abdominal pain following a spinal fusion of L4 and L5 on  at [facility].     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 Patient improved is passing flatus and frequent watery stools. No abdominal tenderness. Abdomen is still distended, but patient feels this is improved. No nausea/vomiting. Surgery is following with no plans for surgery. Patient has advanced to regular diet.  Expect patient's loose stools to improve with regular diet and holding of magnesium.      1. Post-op ileus   2. Loose stools  - Discontinue magnesium as this could be contributing to loose stools   - Advance to regular diet per surgery  - Encourage ambulation  - Could consider neostigmine if failing to improve    GI will sign off. Please reach out with questions or concerns.     Discussed patient findings and plan with Dr. Elizondo.     Total time: 25 minutes of total time was spent providing patient care, including patient evaluation, reviewing documentation/test results, and .     Shannon Marcial PA-C  [facility] ([facility])

## 2024-06-05 NOTE — PROGRESS NOTES
[facility]  Medicine Progress Note - Hospitalist Service  Date of Admission:  5/31/2024    Assessment & Plan   Eddie Peralta is a 84 year old male with a history of Cataract, Arthritis, Atrial Fibrillation, GERD, HLD, BPH, DDD, Spinal Stenosis who presents to the emergency department for evaluation of constipation and abdominal pain following a spinal fusion of L4 and L5 on 5/28 at [facility].      CT A/P on presentation showed mildly dilated ileal small bowel loops and ascending colon with tapering to non-dilated transverse/distal colon consistent with ileus.  He was monitored but unfortunately worsened over 24-48 hours.  XR showed worsened dilation of small bowel loops and lactic climbed.  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      General surgery & [facility] are following.  Patient has had some relief with tap water enema but remains very nauseated and distended.  Hospital course complicated by afib with RVR.        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  Last BM on 5/29.  Using Oxycodone for pain control after his spine surgery on 5/28.  On arrival, CT abd/pelvis reveals a mildly dilated distal ileal small bowel loops and ascending colon with tapering to nondilated transverse/distal colon consistent with ileus.  No discrete transition point.  Abd XR on AM 6/1 shows worsening.  Repeat CT on 6/1 shows increasingly distended cecum - now at 9.5 cm.  Discussed with surgery & [facility] - consideration for early shadi's (although cecum is generally >12 cm with this).  GI recommended tap water enema which helped with abdominal pain but patient has ongoing nausea and abdominal distention.   -General surgery & [facility] following, appreciate recs  -If patient worsens, will need to further consider neostigmine  -Continue gentle IV fluid hydration, liquid diet, analgesics prn; try to avoid  narcotics  -For pain control, the patient has IV dilaudid, scheduled tylenol  -Continue PPI.    Today.  -Patient tolerated liquid diet and advanced to regular,.  -BM frequency also improving,, the color also changed from clear water to loose stool.  -Abdominal distention is improving.  -No nausea or vomiting.  -Continue to monitor off antibiotics.  -Gentle IV fluid, will consider restarting later today.  -Avoid opioids as much as possible.  -Discontinued heparin drip as there was no plan for surgery, Eliquis restarted.  -Surgery and GI input appreciated.  -If abdominal distention continues to improve, bowel movement is more consistent and tolerates regular diet can be discharged within 1 to 2 days.      Chronic Afib with RVR episode during this hospital stay:  -Diagnosed 2022 s/p Cardioversion 7/2022 with recurrence shortly after.    -No hx of obstructive CAD or CHF per last Cardiology note 5/2023.    -Has been on IV fluid as he was n.p.o., will consider stopping it if he tolerates oral intake .  -Not on any medication for rate control.  -Early during this hospitalization, his HR was well controlled.    -Unfortunately, this has slowly increased in the setting of pain/nausea and then jumped to 150-170s after ambulating on 6/2.  Cardiac telemetry is consistent with afib.  -metoprolol tartrate 25 mg BID  -K goal >4, Mag goal >2  -Restarted Eliquis 6/5, heparin discontinued .  -CHADSVasc score in range to anticoagulate but lower range.   - It was to resume post-op 6/1 but given concern for possible GI surgery/NPO status it has not yet resumed.   -Currently on heparin drip, will continue to monitor on heparin drip for now.  -Will consider starting DOAC if bowel obstruction is completely resolved within normal anticipated plan for surgery.      Lumbar Spinal Stenosis  S/p Transfacet/Transforaminal L4 to: L5 Posterior Spine Fusion with Instrumentation L4 to: L5 Transforaminal Lumbar Interbody Fusion L4 to: L5 on 5/28/24 at  [facility]   -Continue brace and post op lifting and bending restrictions  -PT/SW consult      HLD  - resume pta Atorvastatin when taking po     GERD  Hx Conti's Esophagus  -Continue IV Protonix, will resume his PTA dose when oral intake improves     BPH  -Continue Flomax as able with GI issues and monitor PVR    PPE Used:  Mask        Diet: Regular Diet Adult    DVT Prophylaxis: Heparin drip  Tse Catheter: Not present  Lines: None     Cardiac Monitoring: ACTIVE order. Indication: Tachyarrhythmias, acute (48 hours)  Code Status: Full Code      Clinically Significant Risk Factors        # Hypokalemia: Lowest K = 3.1 mmol/L in last 2 days, will replace as needed       # Hypoalbuminemia: Lowest albumin = 3.1 g/dL at 6/3/2024  6:56 AM, will monitor as appropriate            # Obesity: Estimated body mass index is 30.29 kg/m  as calculated from the following:    Height as of this encounter: 1.778 m (5' 10\").    Weight as of this encounter: 95.8 kg (211 lb 1.6 oz).             Disposition Plan     Medically Ready for Discharge: Expect in the hospital 2 more days, pending improvement in bowel function and when he tolerates oral intake         I discussed with patient, with his son at bedside, all their questions answered.  Kodi Lowe MD  Hospitalist Service  [facility]  Securely message with [employer] (more info)  Text page via [facility] Paging/Directory   ______________________________________________________________________    Interval History   Patient seen and examined, no new overnight issues, had bowel movements, no slowing, not clear watery at this time. H&P, labs, medications reviewed.  His significant other and son at bedside.  Abdomen is still distended, stated it is improving, no nausea or vomiting, tolerating oral intake.  He has multiple small bowel movements, mostly liquid.  He feels hungry, appetite improving.    Physical Exam   Vital Signs: Temp: 97.9  F (36.6  C) Temp src: Temporal " BP: 125/82 Pulse: 88   Resp: 18 SpO2: 98 % O2 Device: None (Room air)    Weight: 211 lbs 1.6 oz    GEN:  Alert, oriented, appears ill but comfortable, no overt distress.  HEENT:  Normocephalic/atraumatic, no scleral icterus, no nasal discharge, mouth moist.  CV:  Regular rate and rhythm, no murmur or JVD.  S1 + S2 noted, no S3 or S4.  LUNGS:  Clear to auscultation bilaterally without rales/rhonchi/wheezing/retractions.  Mildly decreased breath sounds bases.  Symmetric chest rise on inhalation noted.  ABD:  Hypoactive bowel sounds, soft, non-tender,  moderately distended.  No guarding.  EXT:  Trace edema.  No cyanosis.  No new joint synovitis noted.  SKIN:  Dry to touch, no new exanthems noted in the visualized areas.    Medical Decision Making       Over 55 MINUTES SPENT BY ME on the date of service doing chart review, history, exam, documentation & further activities per the note.      Data   Medications   Current Facility-Administered Medications   Medication Dose Route Frequency Provider Last Rate Last Admin    sodium chloride 0.9 % infusion   Intravenous Continuous Kodi Lowe MD 75 mL/hr at 06/05/24 1112 Rate Change at 06/05/24 1112     Current Facility-Administered Medications   Medication Dose Route Frequency Provider Last Rate Last Admin    acetaminophen (TYLENOL) tablet 975 mg  975 mg Oral Q8H Bienvenido Turner DO   975 mg at 06/03/24 0016    Or    acetaminophen (TYLENOL) Suppository 650 mg  650 mg Rectal Q8H Bienvenido Turner DO        apixaban ANTICOAGULANT (ELIQUIS) tablet 5 mg  5 mg Oral BID Kodi Lowe MD   5 mg at 06/05/24 1156    metoprolol tartrate (LOPRESSOR) tablet 25 mg  25 mg Oral BID Bienvenido Turner DO   25 mg at 06/05/24 0852    pantoprazole (PROTONIX) IV push injection 40 mg  40 mg Intravenous Q12H Bienvenido Turner DO   40 mg at 06/05/24 0852    sodium chloride (PF) 0.9% PF flush 3 mL  3 mL Intracatheter Q8H Poonam Ramsey PA-C   3 mL at 06/05/24 1112    tamsulosin  (FLOMAX) capsule 0.4 mg  0.4 mg Oral At Bedtime Poonam Ramsey PA-C   0.4 mg at 06/04/24 2147     Labs and Imaging results below reviewed today.  Recent Labs   Lab 06/04/24  0652 06/03/24  1005 06/03/24  0656 06/02/24  0547   WBC  --  4.3 3.9* 13.4*   HGB  --  12.9* 13.1* 14.6   HCT  --  38.0* 38.4* 42.2   MCV  --  94 94 93    169 168 195     Recent Labs   Lab 06/05/24  1333 06/05/24  0632 06/04/24  1511 06/04/24  0652 06/03/24  0656 06/02/24  0547 06/01/24  1657 06/01/24  1657   NA  --   --   --  142 142 141  --  138   POTASSIUM 3.2* 3.1* 3.7 3.3* 3.7 3.8  --  4.7   CHLORIDE  --   --   --  111* 109* 106  --  102   CO2  --   --   --  18* 22 22  --  22   ANIONGAP  --   --   --  13 11 13  --  14   GLC  --   --   --  87 106* 140*  --  164*   BUN  --   --   --  29.2* 32.3* 26.3*  --  24.7*   CR  --   --   --  0.82 0.90 0.70  --  0.81   GFRESTIMATED  --   --   --  88 85 >90  --  88   REN  --   --   --  7.8* 7.9* 8.2*  --  8.6*   MAG  --  1.8  --  2.0 2.0 2.0   < >  --    PHOS  --   --   --   --   --  3.2  --   --    PROTTOTAL  --   --   --   --  5.5* 6.1*  --  6.3*   ALBUMIN  --   --   --   --  3.1* 3.2*  --  3.4*   BILITOTAL  --   --   --   --  1.3* 1.4*  --  1.4*   ALKPHOS  --   --   --   --  56 69  --  77   AST  --   --   --   --  19 16  --  10   ALT  --   --   --   --  12 12  --  13    < > = values in this interval not displayed.     No results found for this or any previous visit (from the past 24 hour(s)).

## 2024-06-05 NOTE — PLAN OF CARE
"Goal Outcome Evaluation:    Plan of Care Reviewed With: patient, child    Overall Patient Progress: improving    Outcome Evaluation: Full liquid diet continued. NS running at 100ml/hr. On room air. Heparin gtts rate adjusted with bolus given. Up independently in room with minimal help from son. K+ and Mg redraw this morning. On tele. Denies pain and abdominal discomfort.    Problem: Adult Inpatient Plan of Care  Goal: Plan of Care Review  Description: The Plan of Care Review/Shift note should be completed every shift.  The Outcome Evaluation is a brief statement about your assessment that the patient is improving, declining, or no change.  This information will be displayed automatically on your shift  note.  Outcome: Progressing  Flowsheets (Taken 6/5/2024 0151)  Outcome Evaluation: Full liquid diet continued. NS running at 100ml/hr. On room air. Heparin gtts rate adjusted with bolus given. Up independently in room with minimal help from son. K+ and Mg redraw this morning. On tele. Denies pain and abdominal discomfort.  Plan of Care Reviewed With:   patient   child  Overall Patient Progress: improving  Goal: Patient-Specific Goal (Individualized)  Description: You can add care plan individualizations to a care plan. Examples of Individualization might be:  \"Parent requests to be called daily at 9am for status\", \"I have a hard time hearing out of my right ear\", or \"Do not touch me to wake me up as it startles  me\".  Outcome: Progressing  Goal: Absence of Hospital-Acquired Illness or Injury  Outcome: Progressing  Intervention: Identify and Manage Fall Risk  Recent Flowsheet Documentation  Taken 6/5/2024 0016 by Naman Villegas RN  Safety Promotion/Fall Prevention: safety round/check completed  Intervention: Prevent Skin Injury  Recent Flowsheet Documentation  Taken 6/5/2024 0016 by Naman Villegas RN  Body Position:   right   side-lying 30 degrees  Skin Protection:   adhesive use limited   transparent dressing " maintained  Device Skin Pressure Protection: absorbent pad utilized/changed  Intervention: Prevent and Manage VTE (Venous Thromboembolism) Risk  Recent Flowsheet Documentation  Taken 6/5/2024 0016 by Naman Villegas RN  VTE Prevention/Management: SCDs (sequential compression devices) on  Intervention: Prevent Infection  Recent Flowsheet Documentation  Taken 6/5/2024 0016 by Naman Villegas RN  Infection Prevention:   rest/sleep promoted   single patient room provided   equipment surfaces disinfected   hand hygiene promoted  Goal: Optimal Comfort and Wellbeing  Outcome: Progressing  Goal: Readiness for Transition of Care  Outcome: Progressing

## 2024-06-05 NOTE — PROGRESS NOTES
"[facility]   General Surgery Progress Note           Assessment and Plan:   Assessment:   Ileus vs possible [place] syndrome following recent spine surgery         Plan:   -advance to regular diet  -GI following   -no surgery planned. OK to restart Eliquis.           Physician Attestation     I saw and evaluated Eddie Peralta as part of a shared APRN/PA visit.     I personally reviewed the vital signs, medications, and labs.    I personally provided a substantive portion of care for this patient and I approve the care plan as written by the PHANI.  I was involved with Medical Decision Making including:  Agree with above. Clinically resolving. Advance diet. Suspect home within a day or two    Radha Lopez MD  Date of Service (when I saw the patient): 06/05/24        Interval History:   Pt comfortable in chair, son present. No abd pain, mild bloating. Last enema was 2 days ago, continues to have watery stools. Tolerating full liquid diet, hungry and wants diet advancement.         Physical Exam:   Blood pressure 127/73, pulse 87, temperature 97.7  F (36.5  C), temperature source Temporal, resp. rate 16, height 1.778 m (5' 10\"), weight 95.8 kg (211 lb 1.6 oz), SpO2 98%.    I/O last 3 completed shifts:  In: 1924 [I.V.:1924]  Out: -     Abdomen:   soft, rounded, non tender          Data:   Data   Recent Labs   Lab 06/05/24  0632 06/04/24  1511 06/04/24  0652 06/03/24  1005 06/03/24  0656 06/02/24  0547   WBC  --   --   --  4.3 3.9* 13.4*   HGB  --   --   --  12.9* 13.1* 14.6   MCV  --   --   --  94 94 93   PLT  --   --  168 169 168 195   NA  --   --  142  --  142 141   POTASSIUM 3.1* 3.7 3.3*  --  3.7 3.8   CHLORIDE  --   --  111*  --  109* 106   CO2  --   --  18*  --  22 22   BUN  --   --  29.2*  --  32.3* 26.3*   CR  --   --  0.82  --  0.90 0.70   ANIONGAP  --   --  13  --  11 13   REN  --   --  7.8*  --  7.9* 8.2*   GLC  --   --  87  --  106* 140*   ALBUMIN  --   --   --   --  3.1* 3.2*   PROTTOTAL  " --   --   --   --  5.5* 6.1*   BILITOTAL  --   --   --   --  1.3* 1.4*   ALKPHOS  --   --   --   --  56 69   ALT  --   --   --   --  12 12   AST  --   --   --   --  19 16           Nova Edwards PA-C

## 2024-06-05 NOTE — PLAN OF CARE
"Goal Outcome Evaluation:      Plan of Care Reviewed With: patient (Son)    Overall Patient Progress: improvingOverall Patient Progress: improving    Outcome Evaluation: pt Diet increased to Regular diet about 1040. Heparin gtt    Heparin gtt discontinued. Eliquis BID started.    Potassium and Mg replaced. Potassium recheck 3.2 ordered another replacement  SBA GB and IV pole and Back Brace.  Son at bedside helps with walks and cares  Continues to have many watery stools. Abdomin tender in spots at times but no fullness and still distended.   Regular diet tolerated  Remote tele AFib CVR  Plan is 2-3 more days until discharge home with son    Problem: Adult Inpatient Plan of Care  Goal: Plan of Care Review  Description: The Plan of Care Review/Shift note should be completed every shift.  The Outcome Evaluation is a brief statement about your assessment that the patient is improving, declining, or no change.  This information will be displayed automatically on your shift  note.  6/5/2024 1018 by Sarah Sanches RN  Outcome: Progressing  Flowsheets (Taken 6/5/2024 1017)  Outcome Evaluation: pt Diet increased to Regular diet about 1040. Heparin gtt  Plan of Care Reviewed With: (Son) patient  Overall Patient Progress: improving  6/5/2024 1017 by Sarah Sanches RN  Outcome: Progressing  Flowsheets (Taken 6/5/2024 1017)  Outcome Evaluation: pt Diet increased to Regular diet about 1040. Heparin gtt  Plan of Care Reviewed With: (Son) patient  Overall Patient Progress: improving  Goal: Patient-Specific Goal (Individualized)  Description: You can add care plan individualizations to a care plan. Examples of Individualization might be:  \"Parent requests to be called daily at 9am for status\", \"I have a hard time hearing out of my right ear\", or \"Do not touch me to wake me up as it startles  me\".  6/5/2024 1018 by Sarah Sanches RN  Outcome: Progressing  6/5/2024 1017 by Sarah Sanches RN  Outcome: " Progressing  Goal: Absence of Hospital-Acquired Illness or Injury  6/5/2024 1018 by Sarah Sanches RN  Outcome: Progressing  6/5/2024 1017 by Sarah Sanches RN  Outcome: Progressing  Intervention: Identify and Manage Fall Risk  Recent Flowsheet Documentation  Taken 6/5/2024 0901 by Sarah Sanches RN  Safety Promotion/Fall Prevention:   activity supervised   assistive device/personal items within reach   clutter free environment maintained   lighting adjusted   mobility aid in reach   nonskid shoes/slippers when out of bed   patient and family education   safety round/check completed  Intervention: Prevent Skin Injury  Recent Flowsheet Documentation  Taken 6/5/2024 0901 by Sarah Sanches RN  Skin Protection:   adhesive use limited   transparent dressing maintained  Device Skin Pressure Protection:   absorbent pad utilized/changed   adhesive use limited  Intervention: Prevent and Manage VTE (Venous Thromboembolism) Risk  Recent Flowsheet Documentation  Taken 6/5/2024 0901 by Sarah Sanches RN  VTE Prevention/Management: SCDs (sequential compression devices) off  Intervention: Prevent Infection  Recent Flowsheet Documentation  Taken 6/5/2024 0901 by Sarah Sanches RN  Infection Prevention:   environmental surveillance performed   hand hygiene promoted   rest/sleep promoted  Goal: Optimal Comfort and Wellbeing  6/5/2024 1018 by Sarah Sanches RN  Outcome: Progressing  6/5/2024 1017 by Sarah Sanches RN  Outcome: Progressing  Goal: Readiness for Transition of Care  6/5/2024 1018 by Sarah Sanches RN  Outcome: Progressing  6/5/2024 1017 by Sarah Sanches RN  Outcome: Progressing     Problem: Intestinal Obstruction  Goal: Optimal Bowel Function  6/5/2024 1018 by Sarah Sanches RN  Outcome: Progressing  6/5/2024 1017 by Sarah Sanches RN  Outcome: Progressing  Intervention: Promote Bowel Function  Recent Flowsheet Documentation  Taken 6/5/2024 0901 by Myron  Sarah SHANKS RN  Positioning/Transfer Devices:   in use   pillows  Chair: Upright in chair  Goal: Fluid and Electrolyte Balance  6/5/2024 1018 by Sarah Sanches RN  Outcome: Progressing  6/5/2024 1017 by Sarah Sanches RN  Outcome: Progressing  Intervention: Monitor and Manage Hypovolemia  Recent Flowsheet Documentation  Taken 6/5/2024 0901 by Sarah Sanches RN  Fluid/Electrolyte Management:   electrolyte supplement initiated   intravenous fluid replacement initiated  Goal: Absence of Infection Signs and Symptoms  6/5/2024 1018 by Sarah Sanches RN  Outcome: Progressing  6/5/2024 1017 by Sarah Sanches RN  Outcome: Progressing  Intervention: Prevent or Manage Infection  Recent Flowsheet Documentation  Taken 6/5/2024 0901 by Sarah Sanches RN  Infection Management: aseptic technique maintained  Goal: Optimize Nutrition Status  6/5/2024 1018 by Sarah Sanches RN  Outcome: Progressing  6/5/2024 1017 by Sarah Sanches RN  Outcome: Progressing  Goal: Optimal Pain Control and Function  6/5/2024 1018 by Sarah Sanches RN  Outcome: Progressing  6/5/2024 1017 by Sarah Sanches RN  Outcome: Progressing     Problem: Skin Injury Risk Increased  Goal: Skin Health and Integrity  6/5/2024 1018 by Sarah Sanches RN  Outcome: Progressing  6/5/2024 1017 by Sarah Sanches RN  Outcome: Progressing  Intervention: Plan: Nurse Driven Intervention: Moisture Management  Recent Flowsheet Documentation  Taken 6/5/2024 0901 by Sarah Sanches RN  Plan: Moisture Management: encourage regular toileting  Intervention: Plan: Nurse Driven Intervention: Friction and Shear  Recent Flowsheet Documentation  Taken 6/5/2024 0901 by Sarah Sanches RN  Friction/Shear Interventions: HOB 30 degrees or less  Intervention: Optimize Skin Protection  Recent Flowsheet Documentation  Taken 6/5/2024 0901 by Sarah Sanches RN  Pressure Reduction Techniques: frequent weight shift  encouraged  Skin Protection:   adhesive use limited   transparent dressing maintained  Activity Management: activity encouraged

## 2024-06-06 LAB
BACTERIA BLD CULT: NO GROWTH
BACTERIA BLD CULT: NO GROWTH
ERYTHROCYTE [DISTWIDTH] IN BLOOD BY AUTOMATED COUNT: 12.7 % (ref 10–15)
HCT VFR BLD AUTO: 36.1 % (ref 40–53)
HGB BLD-MCNC: 12.9 G/DL (ref 13.3–17.7)
MAGNESIUM SERPL-MCNC: 1.8 MG/DL (ref 1.7–2.3)
MCH RBC QN AUTO: 32.3 PG (ref 26.5–33)
MCHC RBC AUTO-ENTMCNC: 35.7 G/DL (ref 31.5–36.5)
MCV RBC AUTO: 91 FL (ref 78–100)
PLATELET # BLD AUTO: 202 10E3/UL (ref 150–450)
POTASSIUM SERPL-SCNC: 3.6 MMOL/L (ref 3.4–5.3)
RBC # BLD AUTO: 3.99 10E6/UL (ref 4.4–5.9)
WBC # BLD AUTO: 6.3 10E3/UL (ref 4–11)

## 2024-06-06 PROCEDURE — 83735 ASSAY OF MAGNESIUM: CPT | Performed by: STUDENT IN AN ORGANIZED HEALTH CARE EDUCATION/TRAINING PROGRAM

## 2024-06-06 PROCEDURE — 36415 COLL VENOUS BLD VENIPUNCTURE: CPT | Performed by: STUDENT IN AN ORGANIZED HEALTH CARE EDUCATION/TRAINING PROGRAM

## 2024-06-06 PROCEDURE — 250N000013 HC RX MED GY IP 250 OP 250 PS 637: Performed by: STUDENT IN AN ORGANIZED HEALTH CARE EDUCATION/TRAINING PROGRAM

## 2024-06-06 PROCEDURE — 84132 ASSAY OF SERUM POTASSIUM: CPT | Performed by: STUDENT IN AN ORGANIZED HEALTH CARE EDUCATION/TRAINING PROGRAM

## 2024-06-06 PROCEDURE — 99232 SBSQ HOSP IP/OBS MODERATE 35: CPT | Performed by: INTERNAL MEDICINE

## 2024-06-06 PROCEDURE — 258N000003 HC RX IP 258 OP 636: Mod: JZ | Performed by: INTERNAL MEDICINE

## 2024-06-06 PROCEDURE — 120N000001 HC R&B MED SURG/OB

## 2024-06-06 PROCEDURE — 250N000011 HC RX IP 250 OP 636: Performed by: INTERNAL MEDICINE

## 2024-06-06 PROCEDURE — 250N000013 HC RX MED GY IP 250 OP 250 PS 637: Performed by: INTERNAL MEDICINE

## 2024-06-06 PROCEDURE — C9113 INJ PANTOPRAZOLE SODIUM, VIA: HCPCS | Performed by: STUDENT IN AN ORGANIZED HEALTH CARE EDUCATION/TRAINING PROGRAM

## 2024-06-06 PROCEDURE — 99231 SBSQ HOSP IP/OBS SF/LOW 25: CPT | Performed by: PHYSICIAN ASSISTANT

## 2024-06-06 PROCEDURE — 85014 HEMATOCRIT: CPT | Performed by: INTERNAL MEDICINE

## 2024-06-06 PROCEDURE — 250N000011 HC RX IP 250 OP 636: Performed by: STUDENT IN AN ORGANIZED HEALTH CARE EDUCATION/TRAINING PROGRAM

## 2024-06-06 PROCEDURE — 250N000013 HC RX MED GY IP 250 OP 250 PS 637: Performed by: PHYSICIAN ASSISTANT

## 2024-06-06 RX ORDER — PANTOPRAZOLE SODIUM 40 MG/1
40 TABLET, DELAYED RELEASE ORAL
Status: DISCONTINUED | OUTPATIENT
Start: 2024-06-06 | End: 2024-06-11 | Stop reason: HOSPADM

## 2024-06-06 RX ORDER — MAGNESIUM HYDROXIDE/ALUMINUM HYDROXICE/SIMETHICONE 120; 1200; 1200 MG/30ML; MG/30ML; MG/30ML
30 SUSPENSION ORAL EVERY 4 HOURS PRN
Status: DISCONTINUED | OUTPATIENT
Start: 2024-06-06 | End: 2024-06-11 | Stop reason: HOSPADM

## 2024-06-06 RX ORDER — MAGNESIUM SULFATE HEPTAHYDRATE 40 MG/ML
2 INJECTION, SOLUTION INTRAVENOUS ONCE
Status: COMPLETED | OUTPATIENT
Start: 2024-06-06 | End: 2024-06-06

## 2024-06-06 RX ORDER — POTASSIUM CHLORIDE 1.5 G/1.58G
20 POWDER, FOR SOLUTION ORAL ONCE
Status: COMPLETED | OUTPATIENT
Start: 2024-06-06 | End: 2024-06-06

## 2024-06-06 RX ADMIN — METOPROLOL TARTRATE 25 MG: 25 TABLET, FILM COATED ORAL at 21:04

## 2024-06-06 RX ADMIN — PANTOPRAZOLE SODIUM 40 MG: 40 TABLET, DELAYED RELEASE ORAL at 16:08

## 2024-06-06 RX ADMIN — APIXABAN 5 MG: 5 TABLET, FILM COATED ORAL at 10:31

## 2024-06-06 RX ADMIN — MAGNESIUM SULFATE HEPTAHYDRATE 2 G: 40 INJECTION, SOLUTION INTRAVENOUS at 10:38

## 2024-06-06 RX ADMIN — ALUMINUM HYDROXIDE, MAGNESIUM HYDROXIDE, AND SIMETHICONE 30 ML: 1200; 120; 1200 SUSPENSION ORAL at 03:39

## 2024-06-06 RX ADMIN — TAMSULOSIN HYDROCHLORIDE 0.4 MG: 0.4 CAPSULE ORAL at 21:04

## 2024-06-06 RX ADMIN — SODIUM CHLORIDE: 900 INJECTION INTRAVENOUS at 12:18

## 2024-06-06 RX ADMIN — POTASSIUM CHLORIDE FOR ORAL SOLUTION 20 MEQ: 1.5 POWDER, FOR SOLUTION ORAL at 06:36

## 2024-06-06 RX ADMIN — METOPROLOL TARTRATE 25 MG: 25 TABLET, FILM COATED ORAL at 10:31

## 2024-06-06 RX ADMIN — APIXABAN 5 MG: 5 TABLET, FILM COATED ORAL at 21:04

## 2024-06-06 RX ADMIN — ACETAMINOPHEN 975 MG: 325 TABLET, FILM COATED ORAL at 10:31

## 2024-06-06 RX ADMIN — PANTOPRAZOLE SODIUM 40 MG: 40 INJECTION, POWDER, FOR SOLUTION INTRAVENOUS at 10:31

## 2024-06-06 ASSESSMENT — ACTIVITIES OF DAILY LIVING (ADL)
ADLS_ACUITY_SCORE: 26
ADLS_ACUITY_SCORE: 25
ADLS_ACUITY_SCORE: 26
ADLS_ACUITY_SCORE: 25
ADLS_ACUITY_SCORE: 25
ADLS_ACUITY_SCORE: 26

## 2024-06-06 NOTE — PLAN OF CARE
"Goal Outcome Evaluation:    Plan of Care Reviewed With: patient, child    Overall Patient Progress: improving    Outcome Evaluation: A little abdominal discomfort after solid food. Plan to take it easy on intake until in the morning. PIV running NS at 75ml/hr. K+ replaced. Recheck at 0207. On tele.    Problem: Adult Inpatient Plan of Care  Goal: Plan of Care Review  Description: The Plan of Care Review/Shift note should be completed every shift.  The Outcome Evaluation is a brief statement about your assessment that the patient is improving, declining, or no change.  This information will be displayed automatically on your shift  note.  Outcome: Progressing  Flowsheets (Taken 6/5/2024 2222)  Outcome Evaluation: A little abdominal discomfort after solid food. Plan to take it easy on intake until in the morning. PIV running NS at 75ml/hr. K+ replaced. Recheck at 0207. On tele.  Plan of Care Reviewed With:   patient   child  Overall Patient Progress: improving  Goal: Patient-Specific Goal (Individualized)  Description: You can add care plan individualizations to a care plan. Examples of Individualization might be:  \"Parent requests to be called daily at 9am for status\", \"I have a hard time hearing out of my right ear\", or \"Do not touch me to wake me up as it startles  me\".  Outcome: Progressing  Goal: Absence of Hospital-Acquired Illness or Injury  Outcome: Progressing  Intervention: Identify and Manage Fall Risk  Recent Flowsheet Documentation  Taken 6/5/2024 2020 by Naman Villegas RN  Safety Promotion/Fall Prevention: safety round/check completed  Intervention: Prevent Skin Injury  Recent Flowsheet Documentation  Taken 6/5/2024 2020 by Naman Villegas RN  Body Position:   position changed independently   supine, head elevated  Skin Protection:   adhesive use limited   transparent dressing maintained  Device Skin Pressure Protection: absorbent pad utilized/changed  Intervention: Prevent and Manage VTE (Venous " Thromboembolism) Risk  Recent Flowsheet Documentation  Taken 6/5/2024 2020 by Naman Villegas RN  VTE Prevention/Management: SCDs (sequential compression devices) on  Intervention: Prevent Infection  Recent Flowsheet Documentation  Taken 6/5/2024 2020 by Naman Villegas RN  Infection Prevention:   rest/sleep promoted   single patient room provided   equipment surfaces disinfected   hand hygiene promoted  Goal: Optimal Comfort and Wellbeing  Outcome: Progressing  Goal: Readiness for Transition of Care  Outcome: Progressing

## 2024-06-06 NOTE — PLAN OF CARE
Goal Outcome Evaluation:      Plan of Care Reviewed With: patient    Overall Patient Progress: no changeOverall Patient Progress: no change       Pt cont to report abd discomfort describing as having the stomach flu. MD paged and PRN MaaLox given with some relief. On tele, had 14 runs of Vtach. K replaced.       Problem: Adult Inpatient Plan of Care  Goal: Plan of Care Review    Outcome: Progressing  Flowsheets (Taken 6/6/2024 0755)  Plan of Care Reviewed With: patient  Overall Patient Progress: no change  Goal: Patient-Specific Goal (Individualized)    Outcome: Progressing  Goal: Absence of Hospital-Acquired Illness or Injury  Outcome: Progressing  Intervention: Identify and Manage Fall Risk  Recent Flowsheet Documentation  Taken 6/6/2024 0022 by Virginie Dubose RN  Safety Promotion/Fall Prevention:   assistive device/personal items within reach   safety round/check completed   nonskid shoes/slippers when out of bed   lighting adjusted   clutter free environment maintained  Intervention: Prevent Infection  Recent Flowsheet Documentation  Taken 6/6/2024 0022 by Virginie Dubose RN  Infection Prevention:   rest/sleep promoted   single patient room provided   equipment surfaces disinfected   hand hygiene promoted  Goal: Optimal Comfort and Wellbeing  Outcome: Progressing  Goal: Readiness for Transition of Care  Outcome: Progressing     Problem: Intestinal Obstruction  Goal: Optimal Bowel Function  Outcome: Progressing  Goal: Fluid and Electrolyte Balance  Outcome: Progressing  Intervention: Monitor and Manage Hypovolemia  Recent Flowsheet Documentation  Taken 6/6/2024 0022 by Virginie Dubose, RN  Fluid/Electrolyte Management:   intravenous fluids adjusted   electrolyte supplement adjusted  Goal: Absence of Infection Signs and Symptoms  Outcome: Progressing  Goal: Optimize Nutrition Status  Outcome: Progressing  Goal: Optimal Pain Control and Function  Outcome: Progressing     Problem:  Skin Injury Risk Increased  Goal: Skin Health and Integrity  Outcome: Progressing  Intervention: Optimize Skin Protection  Recent Flowsheet Documentation  Taken 6/6/2024 0347 by Virginie Dubose, RN  Activity Management: ambulated to bathroom

## 2024-06-06 NOTE — PROGRESS NOTES
"[facility]   General Surgery Progress Note           Assessment and Plan:   Assessment:   Ileus vs possible [place] syndrome following recent spine surgery         Plan:   -Trial low fiber diet  -re-consult GI if fails to tolerate solid diet today  -no surgery planned.   Agree, seems improved, suspect it will be gradual that his bowel function returns to its norm.           Interval History:   Pt comfortable in chair, son and grandson in room. Discussed worsened pain after eating roast turkey last night which subsided after Maalox. Now feeling well again and hungry. Would like to try scrambled eggs. C/o poor sleep due to watery diarrhea every 90 minutes. Requiring magnesium replacement which may be contributing per GI note.       Physical Exam:   Blood pressure (!) 151/83, pulse 91, temperature 98.7  F (37.1  C), temperature source Temporal, resp. rate 18, height 1.778 m (5' 10\"), weight 95.8 kg (211 lb 1.6 oz), SpO2 98%.    I/O last 3 completed shifts:  In: 1843 [I.V.:1843]  Out: -     Abdomen:   soft, rounded, non tender          Data:   Data   Recent Labs   Lab 06/06/24  0657 06/06/24  0201 06/05/24  2021 06/05/24  1333 06/04/24  1511 06/04/24  0652 06/03/24  1005 06/03/24  0656 06/02/24  0547   WBC 6.3  --   --   --   --   --  4.3 3.9* 13.4*   HGB 12.9*  --   --   --   --   --  12.9* 13.1* 14.6   MCV 91  --   --   --   --   --  94 94 93     --   --   --   --  168 169 168 195   NA  --   --   --   --   --  142  --  142 141   POTASSIUM  --  3.6 3.2* 3.2*   < > 3.3*  --  3.7 3.8   CHLORIDE  --   --   --   --   --  111*  --  109* 106   CO2  --   --   --   --   --  18*  --  22 22   BUN  --   --   --   --   --  29.2*  --  32.3* 26.3*   CR  --   --   --   --   --  0.82  --  0.90 0.70   ANIONGAP  --   --   --   --   --  13  --  11 13   REN  --   --   --   --   --  7.8*  --  7.9* 8.2*   GLC  --   --   --   --   --  87  --  106* 140*   ALBUMIN  --   --   --   --   --   --   --  3.1* 3.2*   PROTTOTAL  " --   --   --   --   --   --   --  5.5* 6.1*   BILITOTAL  --   --   --   --   --   --   --  1.3* 1.4*   ALKPHOS  --   --   --   --   --   --   --  56 69   ALT  --   --   --   --   --   --   --  12 12   AST  --   --   --   --   --   --   --  19 16    < > = values in this interval not displayed.           Nova Edwards PA-C

## 2024-06-06 NOTE — PROGRESS NOTES
[facility]  Medicine Progress Note - Hospitalist Service  Date of Admission:  5/31/2024    Assessment & Plan   Eddie Peralta is a 84 year old male with a history of Cataract, Arthritis, Atrial Fibrillation, GERD, HLD, BPH, DDD, Spinal Stenosis who presents to the emergency department for evaluation of constipation and abdominal pain following a spinal fusion of L4 and L5 on 5/28 at [facility].      CT A/P on presentation showed mildly dilated ileal small bowel loops and ascending colon with tapering to non-dilated transverse/distal colon consistent with ileus.  He was monitored but unfortunately worsened over 24-48 hours.  XR showed worsened dilation of small bowel loops and lactic climbed.  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      General surgery & [facility] are following.  Patient has had some relief with tap water enema but remains very nauseated and distended.  Hospital course complicated by afib with RVR.        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  Last BM on 5/29.  Using Oxycodone for pain control after his spine surgery on 5/28.  On arrival, CT abd/pelvis reveals a mildly dilated distal ileal small bowel loops and ascending colon with tapering to nondilated transverse/distal colon consistent with ileus.  No discrete transition point.  Abd XR on AM 6/1 shows worsening.  Repeat CT on 6/1 shows increasingly distended cecum - now at 9.5 cm.  Discussed with surgery & [facility] - consideration for early shadi's (although cecum is generally >12 cm with this).  GI recommended tap water enema which helped with abdominal pain but patient has ongoing nausea and abdominal distention.   -General surgery & [facility] following, appreciate recs  -If patient worsens, will need to further consider neostigmine  -Continue gentle IV fluid hydration, liquid diet, analgesics prn; try to avoid  narcotics  -For pain control, the patient has IV dilaudid, scheduled tylenol  -Continue PPI.    Today.  -Overnight developed vague abdominal discomfort and Miramax was given.  -Also noted few runs ov V-tach, lytes replaced.  -Diet was advanced to regular 6/5, but had episode of pain last night again.  -Monitor lytes.  -Monitor BM, and adjust stool softeners.  -Abdominal distention is still there, slightly better  -No nausea or vomiting.  -Continue to monitor off antibiotics.  -Avoid opioids as much as possible.  -Discontinued heparin drip as there was no plan for surgery, Eliquis restarted 6/5.  -Surgery and GI input appreciated.  -If abdominal distention continues to improve, pain is better, tolerates oral intake, can be discharged in 1-2 days.  -Advised to go slow on diet.  He felt bloated and then developed pain after he ate more than she should per patient    Chronic Afib with RVR episode during this hospital stay:  -Diagnosed 2022 s/p Cardioversion 7/2022 with recurrence shortly after.    -No hx of obstructive CAD or CHF per last Cardiology note 5/2023.    -Has been on IV fluid as he was n.p.o., will consider stopping it if he tolerates oral intake .  -Not on any medication for rate control.  -Early during this hospitalization, his HR was well controlled.    -Unfortunately, this has slowly increased in the setting of pain/nausea and then jumped to 150-170s after ambulating on 6/2.  Cardiac telemetry is consistent with afib.  -metoprolol tartrate 25 mg BID  -K goal >4, Mag goal >2  -Restarted Eliquis 6/5, heparin discontinued .  -CHADSVasc score within the anticoagulation range.   -Initially he was on heparin drip for now and transition to Eliquis.      Lumbar Spinal Stenosis  S/p Transfacet/Transforaminal L4 to: L5 Posterior Spine Fusion with Instrumentation L4 to: L5 Transforaminal Lumbar Interbody Fusion L4 to: L5 on 5/28/24 at [facility]   -Continue brace and post op lifting and bending restrictions  -PT/SW  "consult      HLD  - resume pta Atorvastatin when taking po     GERD  Hx Conti's Esophagus  -Continue IV Protonix, will resume his PTA dose when oral intake improves     BPH  -Continue Flomax as able with GI issues and monitor PVR         Diet: Clear Liquid Diet    DVT Prophylaxis: Heparin drip  Tse Catheter: Not present  Lines: None     Cardiac Monitoring: ACTIVE order. Indication: Tachyarrhythmias, acute (48 hours)  Code Status: Full Code      Clinically Significant Risk Factors        # Hypokalemia: Lowest K = 3.1 mmol/L in last 2 days, will replace as needed       # Hypoalbuminemia: Lowest albumin = 3.1 g/dL at 6/3/2024  6:56 AM, will monitor as appropriate            # Obesity: Estimated body mass index is 30.29 kg/m  as calculated from the following:    Height as of this encounter: 1.778 m (5' 10\").    Weight as of this encounter: 95.8 kg (211 lb 1.6 oz).             Disposition Plan     Medically Ready for Discharge: Expect in the hospital 1-2 more days, pending improvement in bowel function and when tolerates oral intake       I discussed with patient, with his son at bedside, all their questions answered.  Kodi Lowe MD  Hospitalist Service  [facility]  Securely message with [employer] (more info)  Text page via [employer] Paging/Directory   ______________________________________________________________________    Interval History   Patient seen and examined, reviewed overnight events,. He is having multiple loose stool, labs, medications reviewed.  His grandson at bedside.  Abdominal distention improving, no nausea or vomiting, tolerating oral intake.  He has multiple small bowel movements, mostly liquid.  He feels hungry, appetite improving.    Physical Exam   Vital Signs: Temp: 98.7  F (37.1  C) Temp src: Temporal BP: 129/76 Pulse: 87   Resp: 18 SpO2: 98 % O2 Device: None (Room air)    Weight: 211 lbs 1.6 oz    GEN:  Alert, oriented, appears ill but comfortable, no overt " distress.  HEENT:  Normocephalic/atraumatic, no scleral icterus, no nasal discharge, mouth moist.  CV:  Regular rate and rhythm, no murmur or JVD.  S1 + S2 noted, no S3 or S4.  LUNGS:  Clear to auscultation bilaterally without rales/rhonchi/wheezing/retractions.  Mildly decreased breath sounds bases.  Symmetric chest rise on inhalation noted.  ABD:  Hypoactive bowel sounds, soft, non-tender,  moderately distended.  No guarding.  EXT:  Trace edema.  No cyanosis.  No new joint synovitis noted.  SKIN:  Dry to touch, no new exanthems noted in the visualized areas.    Medical Decision Making       Over 55 MINUTES SPENT BY ME on the date of service doing chart review, history, exam, documentation & further activities per the note.      Data   Medications   Current Facility-Administered Medications   Medication Dose Route Frequency Provider Last Rate Last Admin    sodium chloride 0.9 % infusion   Intravenous Continuous Kodi Lowe MD 75 mL/hr at 06/05/24 2138 New Bag at 06/05/24 2138     Current Facility-Administered Medications   Medication Dose Route Frequency Provider Last Rate Last Admin    acetaminophen (TYLENOL) tablet 975 mg  975 mg Oral Q8H Bienvenido Turner DO   975 mg at 06/03/24 0016    Or    acetaminophen (TYLENOL) Suppository 650 mg  650 mg Rectal Q8H Bienvenido Turner DO        apixaban ANTICOAGULANT (ELIQUIS) tablet 5 mg  5 mg Oral BID Kodi Lowe MD   5 mg at 06/05/24 2008    magnesium sulfate 2 g in 50 mL sterile water intermittent infusion  2 g Intravenous Once Kodi Lowe MD        metoprolol tartrate (LOPRESSOR) tablet 25 mg  25 mg Oral BID Bienvenido Turner DO   25 mg at 06/05/24 2008    pantoprazole (PROTONIX) IV push injection 40 mg  40 mg Intravenous Q12H Bienvenido Turner DO   40 mg at 06/05/24 2132    sodium chloride (PF) 0.9% PF flush 3 mL  3 mL Intracatheter Q8H Poonam Ramsey PA-C   3 mL at 06/05/24 1112    tamsulosin (FLOMAX) capsule 0.4 mg  0.4 mg Oral At  Bedtime Poonam Ramsey PA-C   0.4 mg at 06/05/24 2132     Labs and Imaging results below reviewed today.  Recent Labs   Lab 06/06/24  0657 06/04/24  0652 06/03/24  1005 06/03/24  0656   WBC 6.3  --  4.3 3.9*   HGB 12.9*  --  12.9* 13.1*   HCT 36.1*  --  38.0* 38.4*   MCV 91  --  94 94    168 169 168     Recent Labs   Lab 06/06/24  0657 06/06/24  0201 06/05/24  2021 06/05/24  1333 06/05/24  0632 06/04/24  1511 06/04/24  0652 06/03/24  0656 06/02/24  0547 06/01/24  1657 06/01/24  1657   NA  --   --   --   --   --   --  142 142 141  --  138   POTASSIUM  --  3.6 3.2* 3.2* 3.1*   < > 3.3* 3.7 3.8  --  4.7   CHLORIDE  --   --   --   --   --   --  111* 109* 106  --  102   CO2  --   --   --   --   --   --  18* 22 22  --  22   ANIONGAP  --   --   --   --   --   --  13 11 13  --  14   GLC  --   --   --   --   --   --  87 106* 140*  --  164*   BUN  --   --   --   --   --   --  29.2* 32.3* 26.3*  --  24.7*   CR  --   --   --   --   --   --  0.82 0.90 0.70  --  0.81   GFRESTIMATED  --   --   --   --   --   --  88 85 >90  --  88   REN  --   --   --   --   --   --  7.8* 7.9* 8.2*  --  8.6*   MAG 1.8  --   --   --  1.8  --  2.0 2.0 2.0   < >  --    PHOS  --   --   --   --   --   --   --   --  3.2  --   --    PROTTOTAL  --   --   --   --   --   --   --  5.5* 6.1*  --  6.3*   ALBUMIN  --   --   --   --   --   --   --  3.1* 3.2*  --  3.4*   BILITOTAL  --   --   --   --   --   --   --  1.3* 1.4*  --  1.4*   ALKPHOS  --   --   --   --   --   --   --  56 69  --  77   AST  --   --   --   --   --   --   --  19 16  --  10   ALT  --   --   --   --   --   --   --  12 12  --  13    < > = values in this interval not displayed.     No results found for this or any previous visit (from the past 24 hour(s)).

## 2024-06-06 NOTE — PROGRESS NOTES
Cross Cover    Called for vague abdominal discomfort after eating solid food today.  Here with adynamic ileus after recent spinal surgery.  States simethicone not helpful in the past    Ordered Maalox (which has simethicone in it along with alum and mag) and see if that helps  Also decreased diet back to clears

## 2024-06-06 NOTE — PLAN OF CARE
"Goal Outcome Evaluation:      Plan of Care Reviewed With: patient    Overall Patient Progress: improvingOverall Patient Progress: improving    Outcome Evaluation: tolerating low fiber diet.    Up ind in room. VSS on room air. On tele. NS@75. Mag replaced. Tolerating low fiber diet.  Up walking the halls several times today. Plan TBD, will continue with plan of care.       Problem: Adult Inpatient Plan of Care  Goal: Plan of Care Review  Description: The Plan of Care Review/Shift note should be completed every shift.  The Outcome Evaluation is a brief statement about your assessment that the patient is improving, declining, or no change.  This information will be displayed automatically on your shift  note.  Outcome: Progressing  Flowsheets (Taken 6/6/2024 1740)  Outcome Evaluation: tolerating low fiber diet.  Plan of Care Reviewed With: patient  Overall Patient Progress: improving  Goal: Patient-Specific Goal (Individualized)  Description: You can add care plan individualizations to a care plan. Examples of Individualization might be:  \"Parent requests to be called daily at 9am for status\", \"I have a hard time hearing out of my right ear\", or \"Do not touch me to wake me up as it startles  me\".  Outcome: Progressing  Goal: Absence of Hospital-Acquired Illness or Injury  Outcome: Progressing  Intervention: Identify and Manage Fall Risk  Recent Flowsheet Documentation  Taken 6/6/2024 1031 by Melina Gagnon RN  Safety Promotion/Fall Prevention:   clutter free environment maintained   increased rounding and observation   lighting adjusted   nonskid shoes/slippers when out of bed   room near nurse's station  Intervention: Prevent Infection  Recent Flowsheet Documentation  Taken 6/6/2024 1031 by Melina Gagnon RN  Infection Prevention:   single patient room provided   rest/sleep promoted  Goal: Optimal Comfort and Wellbeing  Outcome: Progressing  Goal: Readiness for Transition of Care  Outcome: Progressing     Problem: " Intestinal Obstruction  Goal: Optimal Bowel Function  Outcome: Progressing  Goal: Fluid and Electrolyte Balance  Outcome: Progressing  Goal: Absence of Infection Signs and Symptoms  Outcome: Progressing  Goal: Optimize Nutrition Status  Outcome: Progressing  Goal: Optimal Pain Control and Function  Outcome: Progressing     Problem: Skin Injury Risk Increased  Goal: Skin Health and Integrity  Outcome: Progressing  Intervention: Plan: Nurse Driven Intervention: Moisture Management  Recent Flowsheet Documentation  Taken 6/6/2024 1000 by Melina Gagnon RN  Moisture Interventions: Encourage regular toileting  Intervention: Plan: Nurse Driven Intervention: Friction and Shear  Recent Flowsheet Documentation  Taken 6/6/2024 1031 by Melina Gagnon RN  Friction/Shear Interventions: HOB 30 degrees or less

## 2024-06-06 NOTE — CONSULTS
"CLINICAL NUTRITION SERVICES  -  ASSESSMENT NOTE      Recommendations:   - Diet per MD teams.  Ok for smaller/more frequent meals.  - Discussed w/ patient option of chocolate Ensure daily at 10 am and sent 1 for sipping this evening.  He is aware of how to order prn as well.    - If requires diet regression, recommend consideration of nutrition support interventions, if aligns with goals of care, given timeframe in which diet restricted acutely and PTA.       MALNUTRITION:  % Weight Loss: Unable to determine, see below  % Intake:  </= 50% for >/= 5 days (severe malnutrition)  Subcutaneous Fat Loss:  None observed   Muscle Loss: Very difficult to tell between acute changes and baseline/age-related losses  Fluid Retention: None documented    Malnutrition Diagnosis: Unable to determine due to unclear wt trends          REASON FOR ASSESSMENT  Eddie Peralta is a 84 year old male seen by Registered Dietitian for length of stay.    PMH of: A fib, GERD, spinal stenosis, spinal fusion 5/28.    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    NUTRITION HISTORY  - Information obtained from patient, family in room, and chart.  - Diet at home: Regular w/ meals BID-TID +/- a snack daily.  - Barriers to PO intakes: Eddie tells me he was eating normally up the day prior to surgery (~5/27).  - Use of oral supplements: None.  - Allergies: NKFA.      CURRENT NUTRITION ORDERS  Diet Order:     Low fiber    Current Intake/Tolerance:  GI previously following, Surgery team following.  Patient NPO w/ NGT earlier in admit.  Diet has fluctuated throughout admit but most recently advanced to fulls 6/04 and solids since 6/05.  Did require diet downgrade back to clears early this AM but since advanced to solids/low fiber by Surgery.  As such, has had minimal nutrition throughout 6 day admit + reported last eating normally on ~5/27.      ANTHROPOMETRICS  Height: 5' 10\"  Weight: 211 lbs 1.6 oz  Body mass index is 30.29 kg/m .  Weight Status:  " Obesity Grade I BMI 30-34.9  Weight History:  Wt Readings from Last 10 Encounters:   06/03/24 95.8 kg (211 lb 1.6 oz)     - Wt of 200# from 5/21/2024.  - Wt of 205# from 1/12/2024.   - Patient tells me his usual weight is somewhere around 200-205#.  He is not sure what his wt has been doing over the past ~week.  - Wt has been variable during admit (200#, 205#, 211#).  Wt of 200# most accurate?  Not clear to me at this time.    LABS: Reviewed.    MEDICATIONS: Reviewed:  - NaCl at 75 mL/hr    GI: Stooling patterns noted w/ recorded BMs 6/01-6/04 and this AM.    SKIN: No current documentation of PI.       ASSESSED NUTRITION NEEDS PER APPROVED PRACTICE GUIDELINES:    Dosing Weight 91 kg - most accurate wt acutely?  Estimated Energy Needs: >/=1944 kcals - [address] Jeor  Justification: maintenance w/ activity factor >/=1.2  Estimated Protein Needs:  grams protein - 1-1.2 g pro/Kg  Justification: preservation of lean body mass, advanced age  Estimated Fluid Needs: per MD      NUTRITION DIAGNOSIS:  Inadequate oral intake related to altered GI function post-op as evidenced by meeting <50% of nutrition needs since ~5/27.    NUTRITION INTERVENTIONS  Recommendations / Nutrition Prescription  See above.    Implementation  Nutrition education: Provided education on above.    Medical Food Supplement: As above.     Collaboration and Referral of Nutrition care: Checked-in w/ RN on unit.    Nutrition goals:  Diet >/=fulls during review timeframe.  PO intakes of at least 50% of meals, snacks, or supplements TID to show improvement in PO intake trending.    MONITORING AND EVALUATION:  Progress towards goals will be monitored and evaluated per protocol and Practice Guidelines          Poonam Romero RDN, LD  Clinical Dietitian  3rd floor/ICU: [phone]  All other floors: [phone]  Weekend/holiday: [phone]  Office: [phone]

## 2024-06-07 LAB
HOLD SPECIMEN: NORMAL
MAGNESIUM SERPL-MCNC: 2 MG/DL (ref 1.7–2.3)
POTASSIUM SERPL-SCNC: 3 MMOL/L (ref 3.4–5.3)
POTASSIUM SERPL-SCNC: 3.6 MMOL/L (ref 3.4–5.3)

## 2024-06-07 PROCEDURE — 250N000011 HC RX IP 250 OP 636: Mod: JZ | Performed by: INTERNAL MEDICINE

## 2024-06-07 PROCEDURE — 84132 ASSAY OF SERUM POTASSIUM: CPT | Performed by: STUDENT IN AN ORGANIZED HEALTH CARE EDUCATION/TRAINING PROGRAM

## 2024-06-07 PROCEDURE — 250N000013 HC RX MED GY IP 250 OP 250 PS 637: Performed by: INTERNAL MEDICINE

## 2024-06-07 PROCEDURE — 120N000001 HC R&B MED SURG/OB

## 2024-06-07 PROCEDURE — 36415 COLL VENOUS BLD VENIPUNCTURE: CPT | Performed by: INTERNAL MEDICINE

## 2024-06-07 PROCEDURE — 250N000013 HC RX MED GY IP 250 OP 250 PS 637: Performed by: STUDENT IN AN ORGANIZED HEALTH CARE EDUCATION/TRAINING PROGRAM

## 2024-06-07 PROCEDURE — 83735 ASSAY OF MAGNESIUM: CPT | Performed by: INTERNAL MEDICINE

## 2024-06-07 PROCEDURE — 258N000003 HC RX IP 258 OP 636: Mod: JZ | Performed by: INTERNAL MEDICINE

## 2024-06-07 PROCEDURE — 250N000013 HC RX MED GY IP 250 OP 250 PS 637: Performed by: PHYSICIAN ASSISTANT

## 2024-06-07 PROCEDURE — 36415 COLL VENOUS BLD VENIPUNCTURE: CPT | Performed by: STUDENT IN AN ORGANIZED HEALTH CARE EDUCATION/TRAINING PROGRAM

## 2024-06-07 PROCEDURE — 99232 SBSQ HOSP IP/OBS MODERATE 35: CPT | Performed by: INTERNAL MEDICINE

## 2024-06-07 RX ORDER — POTASSIUM CHLORIDE 1500 MG/1
40 TABLET, EXTENDED RELEASE ORAL ONCE
Status: COMPLETED | OUTPATIENT
Start: 2024-06-07 | End: 2024-06-07

## 2024-06-07 RX ORDER — POTASSIUM CHLORIDE 1500 MG/1
20 TABLET, EXTENDED RELEASE ORAL ONCE
Status: DISCONTINUED | OUTPATIENT
Start: 2024-06-07 | End: 2024-06-07

## 2024-06-07 RX ORDER — SODIUM CHLORIDE AND POTASSIUM CHLORIDE 150; 900 MG/100ML; MG/100ML
INJECTION, SOLUTION INTRAVENOUS CONTINUOUS
Status: DISCONTINUED | OUTPATIENT
Start: 2024-06-07 | End: 2024-06-08

## 2024-06-07 RX ORDER — POTASSIUM CHLORIDE 1.5 G/1.58G
20 POWDER, FOR SOLUTION ORAL ONCE
Status: COMPLETED | OUTPATIENT
Start: 2024-06-07 | End: 2024-06-07

## 2024-06-07 RX ORDER — MAGNESIUM OXIDE 400 MG/1
400 TABLET ORAL EVERY 4 HOURS
Status: DISPENSED | OUTPATIENT
Start: 2024-06-07 | End: 2024-06-07

## 2024-06-07 RX ORDER — POTASSIUM CHLORIDE 750 MG/1
20 TABLET, EXTENDED RELEASE ORAL ONCE
Status: COMPLETED | OUTPATIENT
Start: 2024-06-07 | End: 2024-06-07

## 2024-06-07 RX ADMIN — APIXABAN 5 MG: 5 TABLET, FILM COATED ORAL at 20:46

## 2024-06-07 RX ADMIN — POTASSIUM CHLORIDE AND SODIUM CHLORIDE: 900; 150 INJECTION, SOLUTION INTRAVENOUS at 13:12

## 2024-06-07 RX ADMIN — POTASSIUM CHLORIDE 40 MEQ: 1500 TABLET, EXTENDED RELEASE ORAL at 08:59

## 2024-06-07 RX ADMIN — APIXABAN 5 MG: 5 TABLET, FILM COATED ORAL at 08:59

## 2024-06-07 RX ADMIN — METOPROLOL TARTRATE 25 MG: 25 TABLET, FILM COATED ORAL at 20:46

## 2024-06-07 RX ADMIN — METOPROLOL TARTRATE 25 MG: 25 TABLET, FILM COATED ORAL at 08:59

## 2024-06-07 RX ADMIN — PANTOPRAZOLE SODIUM 40 MG: 40 TABLET, DELAYED RELEASE ORAL at 08:59

## 2024-06-07 RX ADMIN — PANTOPRAZOLE SODIUM 40 MG: 40 TABLET, DELAYED RELEASE ORAL at 16:02

## 2024-06-07 RX ADMIN — ACETAMINOPHEN 975 MG: 325 TABLET, FILM COATED ORAL at 16:02

## 2024-06-07 RX ADMIN — POTASSIUM CHLORIDE 20 MEQ: 750 TABLET, FILM COATED, EXTENDED RELEASE ORAL at 11:06

## 2024-06-07 RX ADMIN — TAMSULOSIN HYDROCHLORIDE 0.4 MG: 0.4 CAPSULE ORAL at 20:46

## 2024-06-07 RX ADMIN — SODIUM CHLORIDE: 900 INJECTION INTRAVENOUS at 00:00

## 2024-06-07 RX ADMIN — Medication 400 MG: at 08:59

## 2024-06-07 RX ADMIN — POTASSIUM CHLORIDE FOR ORAL SOLUTION 20 MEQ: 1.5 POWDER, FOR SOLUTION ORAL at 16:02

## 2024-06-07 ASSESSMENT — ACTIVITIES OF DAILY LIVING (ADL)
ADLS_ACUITY_SCORE: 25
ADLS_ACUITY_SCORE: 25
ADLS_ACUITY_SCORE: 26
ADLS_ACUITY_SCORE: 25
ADLS_ACUITY_SCORE: 26
ADLS_ACUITY_SCORE: 25
ADLS_ACUITY_SCORE: 26
ADLS_ACUITY_SCORE: 25

## 2024-06-07 NOTE — PLAN OF CARE
"Please see flowsheets for detailed vital signs and assessments.   Vital Signs: stable. 02 - 98% on RA.   Pain: denies  Tele: A fib controlled. Hr - 92  CMS: Intact  Respiratory: LS clear  GI: Loose stools this shift.   : voiding w/o difficulty, up to bathroom  Skin: WDL  Activity: Independent in room. Back brace on when out of bed.   Diet: Low fiber. Poor appetite as is afraid to have symptoms.  Protocols: K & Mg protocol. Replaced and AM draw.  Plan: . Continue with plan of care.    A&Ox4. IV - Infusing 75ml/hr.     Problem: Adult Inpatient Plan of Care  Goal: Plan of Care Review  Description: The Plan of Care Review/Shift note should be completed every shift.  The Outcome Evaluation is a brief statement about your assessment that the patient is improving, declining, or no change.  This information will be displayed automatically on your shift  note.  Outcome: Progressing  Flowsheets (Taken 6/7/2024 1506)  Plan of Care Reviewed With: patient  Overall Patient Progress: improving  Goal: Patient-Specific Goal (Individualized)  Description: You can add care plan individualizations to a care plan. Examples of Individualization might be:  \"Parent requests to be called daily at 9am for status\", \"I have a hard time hearing out of my right ear\", or \"Do not touch me to wake me up as it startles  me\".  Outcome: Progressing  Goal: Absence of Hospital-Acquired Illness or Injury  Outcome: Progressing  Intervention: Identify and Manage Fall Risk  Recent Flowsheet Documentation  Taken 6/7/2024 1300 by Mee Brito RN  Safety Promotion/Fall Prevention:   activity supervised   assistive device/personal items within reach   clutter free environment maintained   lighting adjusted   nonskid shoes/slippers when out of bed   safety round/check completed  Intervention: Prevent Skin Injury  Recent Flowsheet Documentation  Taken 6/7/2024 1300 by Mee Brito RN  Body Position: supine, head elevated  Device Skin Pressure Protection: " absorbent pad utilized/changed  Taken 6/7/2024 0856 by Mee Brito RN  Body Position: supine, head elevated  Intervention: Prevent and Manage VTE (Venous Thromboembolism) Risk  Recent Flowsheet Documentation  Taken 6/7/2024 1300 by Mee Brito RN  VTE Prevention/Management: SCDs (sequential compression devices) off  Goal: Optimal Comfort and Wellbeing  Outcome: Progressing  Goal: Readiness for Transition of Care  Outcome: Progressing     Problem: Intestinal Obstruction  Goal: Optimal Bowel Function  Outcome: Progressing  Intervention: Promote Bowel Function  Recent Flowsheet Documentation  Taken 6/7/2024 1300 by Mee Brito RN  Body Position: supine, head elevated  Head of Bed (HOB) Positioning: HOB at 20-30 degrees  Taken 6/7/2024 0856 by Mee Brito RN  Body Position: supine, head elevated  Head of Bed (HOB) Positioning: HOB at 20-30 degrees  Goal: Fluid and Electrolyte Balance  Outcome: Progressing  Intervention: Monitor and Manage Hypovolemia  Recent Flowsheet Documentation  Taken 6/7/2024 1300 by Mee Brito RN  Fluid/Electrolyte Management:   electrolyte supplement adjusted   fluids provided   intravenous fluid replacement initiated  Goal: Absence of Infection Signs and Symptoms  Outcome: Progressing  Goal: Optimize Nutrition Status  Outcome: Progressing  Goal: Optimal Pain Control and Function  Outcome: Progressing     Problem: Skin Injury Risk Increased  Goal: Skin Health and Integrity  Outcome: Progressing  Intervention: Plan: Nurse Driven Intervention: Moisture Management  Recent Flowsheet Documentation  Taken 6/7/2024 1300 by Mee Brito RN  Skin Appearance: Normal (no redness or breakdown)  Plan: Moisture Management: encourage regular toileting  Taken 6/7/2024 0749 by Mee Brito RN  Moisture Interventions: Encourage regular toileting  Bathing/Skin Care: other (see comments)  Intervention: Optimize Skin Protection  Recent Flowsheet Documentation  Taken 6/7/2024 1300 by Mee Brito  RN  Pressure Reduction Techniques: frequent weight shift encouraged  Head of Bed (HOB) Positioning: HOB at 20-30 degrees  Taken 6/7/2024 0856 by Mee Brito RN  Head of Bed (HOB) Positioning: HOB at 20-30 degrees   Goal Outcome Evaluation:      Plan of Care Reviewed With: patient    Overall Patient Progress: improvingOverall Patient Progress: improving

## 2024-06-07 NOTE — PLAN OF CARE
"Goal Outcome Evaluation:      Plan of Care Reviewed With: patient, child    Overall Patient Progress: improving    Outcome Evaluation: Pt is hemodynamically stable. Afebrile. Small BM x3 watery this afternoon. Abd is still distended with hypoactive sounds in all quadrants.    Pt denied pain, nausea/vomiting. Lung sounds clear throughout all fields anteriorly and posteriorly. Up independently in room with back brace. Voided clear yellow urine. Tolerating low fiber diet. Surgical incision to lower back without drainage, swelling. Secured with steri strips.       Problem: Adult Inpatient Plan of Care  Goal: Plan of Care Review  Description: The Plan of Care Review/Shift note should be completed every shift.  The Outcome Evaluation is a brief statement about your assessment that the patient is improving, declining, or no change.  This information will be displayed automatically on your shift  note.  Outcome: Progressing  Flowsheets (Taken 6/7/2024 1845)  Outcome Evaluation: Pt is hemodynamically stable. Afebrile. Small BM x1 watery this afternoon. Abd is still distended with hypoactive sounds in all quadrants.  Plan of Care Reviewed With:   patient   child  Overall Patient Progress: improving  Goal: Patient-Specific Goal (Individualized)  Description: You can add care plan individualizations to a care plan. Examples of Individualization might be:  \"Parent requests to be called daily at 9am for status\", \"I have a hard time hearing out of my right ear\", or \"Do not touch me to wake me up as it startles  me\".  Outcome: Progressing  Goal: Absence of Hospital-Acquired Illness or Injury  Outcome: Progressing  Intervention: Identify and Manage Fall Risk  Recent Flowsheet Documentation  Taken 6/7/2024 1621 by Dimple Miller, RN  Safety Promotion/Fall Prevention:   activity supervised   assistive device/personal items within reach   clutter free environment maintained   lighting adjusted   nonskid shoes/slippers when out of bed   " safety round/check completed  Intervention: Prevent Skin Injury  Recent Flowsheet Documentation  Taken 6/7/2024 1621 by Dimple Miller RN  Body Position: supine, head elevated  Skin Protection: adhesive use limited  Device Skin Pressure Protection: absorbent pad utilized/changed  Intervention: Prevent and Manage VTE (Venous Thromboembolism) Risk  Recent Flowsheet Documentation  Taken 6/7/2024 1621 by Dimple Miller RN  VTE Prevention/Management: SCDs (sequential compression devices) off  Intervention: Prevent Infection  Recent Flowsheet Documentation  Taken 6/7/2024 1621 by Dimple Miller RN  Infection Prevention:   rest/sleep promoted   personal protective equipment utilized   hand hygiene promoted   single patient room provided  Goal: Optimal Comfort and Wellbeing  Outcome: Progressing  Goal: Readiness for Transition of Care  Outcome: Progressing     Problem: Intestinal Obstruction  Goal: Optimal Bowel Function  Outcome: Progressing  Intervention: Promote Bowel Function  Recent Flowsheet Documentation  Taken 6/7/2024 1621 by Dimple Milelr RN  Body Position: supine, head elevated  Head of Bed (HOB) Positioning: HOB at 20-30 degrees  Positioning/Transfer Devices:   pillows   in use  Goal: Fluid and Electrolyte Balance  Outcome: Progressing  Intervention: Monitor and Manage Hypovolemia  Recent Flowsheet Documentation  Taken 6/7/2024 1621 by Dimple Miller RN  Fluid/Electrolyte Management: fluids provided  Goal: Absence of Infection Signs and Symptoms  Outcome: Progressing  Intervention: Prevent or Manage Infection  Recent Flowsheet Documentation  Taken 6/7/2024 1621 by Dimple Miller RN  Infection Management: aseptic technique maintained  Goal: Optimize Nutrition Status  Outcome: Progressing  Goal: Optimal Pain Control and Function  Outcome: Progressing     Problem: Skin Injury Risk Increased  Goal: Skin Health and Integrity  Outcome: Progressing  Intervention: Plan: Nurse Driven Intervention: Moisture  Management  Recent Flowsheet Documentation  Taken 6/7/2024 1621 by Dimple Miller, RN  Moisture Interventions:   Encourage regular toileting   No brief in bed  Skin Appearance: Normal (no redness or breakdown)  Frequency of Application: with each episode of incontinence  Plan: Moisture Management: encourage regular toileting  Intervention: Plan: Nurse Driven Intervention: Friction and Shear  Recent Flowsheet Documentation  Taken 6/7/2024 1621 by Dimple Miller, RN  Friction/Shear Interventions: HOB 30 degrees or less  Intervention: Optimize Skin Protection  Recent Flowsheet Documentation  Taken 6/7/2024 1621 by Dimple Miller, RN  Pressure Reduction Techniques: frequent weight shift encouraged  Skin Protection: adhesive use limited  Activity Management:   activity adjusted per tolerance   activity encouraged  Head of Bed (HOB) Positioning: HOB at 20-30 degrees       No

## 2024-06-07 NOTE — PROGRESS NOTES
[facility]  Medicine Progress Note - Hospitalist Service  Date of Admission:  5/31/2024    Assessment & Plan   Eddie Peralta is a 84 year old male with a history of Cataract, Arthritis, Atrial Fibrillation, GERD, HLD, BPH, DDD, Spinal Stenosis who presents to the emergency department for evaluation of constipation and abdominal pain following a spinal fusion of L4 and L5 on 5/28 at [facility].      CT A/P on presentation showed mildly dilated ileal small bowel loops and ascending colon with tapering to non-dilated transverse/distal colon consistent with ileus.  He was monitored but unfortunately worsened over 24-48 hours.  XR showed worsened dilation of small bowel loops and lactic climbed.  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      General surgery & [facility] are following.  Patient has had some relief with tap water enema but remains very nauseated and distended.  Hospital course complicated by afib with RVR.        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  Last BM on 5/29.  Using Oxycodone for pain control after his spine surgery on 5/28.  On arrival, CT abd/pelvis reveals a mildly dilated distal ileal small bowel loops and ascending colon with tapering to nondilated transverse/distal colon consistent with ileus.  No discrete transition point.  Abd XR on AM 6/1 shows worsening.  Repeat CT on 6/1 shows increasingly distended cecum - now at 9.5 cm.  Discussed with surgery & [facility] - consideration for early shadi's (although cecum is generally >12 cm with this).  GI recommended tap water enema which helped with abdominal pain but patient has ongoing nausea and abdominal distention.   -General surgery & [facility] following, appreciate recs  -If patient worsens, will need to further consider neostigmine  -Continue gentle IV fluid hydration, liquid diet, analgesics prn; try to avoid  narcotics  -For pain control, the patient has IV dilaudid, scheduled tylenol  -Continue PPI.    Today 6/7.  -Overnight about 4-5 bowel movements, abdominal discomfort is improving.  -Tolerating full liquid diet.  -Due to frequent loose stools, discontinued oral magnesium oxide replacement.  -Will use magnesium sulfate IV per protocol for hypomagnesemia.  -Continue close monitoring   -Ambulating on the hallway with his son.  -Abdomen is still distended but seem to be improving.  -Electrolytes are being replaced per protocol.  -No nausea or vomiting.  -Continue to monitor off antibiotics.  -Avoid opioids as much as possible.  -Discontinued heparin drip as there was no plan for surgery, Eliquis restarted on 6/5 and so far tolerating.  -Surgery and GI input appreciated.  -If abdominal distention continues to improve, pain is better, tolerates oral intake, can be discharged in 2 days.  -If patient does not tolerate advanced diet, will reconsult GI to reevaluate the patient    Chronic Afib with RVR episode during this hospital stay:  -Diagnosed 2022 s/p Cardioversion 7/2022 with recurrence shortly after.    -No hx of obstructive CAD or CHF per last Cardiology note 5/2023.    -Has been on IV fluid as he was n.p.o., will consider stopping it if he tolerates oral intake .  -Not on any medication for rate control.  -Early during this hospitalization, his HR was well controlled.    -Unfortunately, this has slowly increased in the setting of pain/nausea and then jumped to 150-170s after ambulating on 6/2.  Cardiac telemetry is consistent with afib.  -metoprolol tartrate 25 mg BID  -K goal >4, Mag goal >2  -Restarted Eliquis 6/5, heparin discontinued .  -CHADSVasc score within the anticoagulation range.       Lumbar Spinal Stenosis  S/p Transfacet/Transforaminal L4 to: L5 Posterior Spine Fusion with Instrumentation L4 to: L5 Transforaminal Lumbar Interbody Fusion L4 to: L5 on 5/28/24 at [facility]   -Continue brace and post op lifting  "and bending restrictions  -PT/SW consult      HLD  - resume pta Atorvastatin when taking po     GERD  Hx Conti's Esophagus  -Continue IV Protonix, will resume his PTA dose when oral intake improves     BPH  -Continue Flomax as able with GI issues and monitor PVR         Diet: Low Fiber Diet  Snacks/Supplements Adult: Ensure Enlive; With Meals    DVT Prophylaxis: Heparin drip  Tse Catheter: Not present  Lines: None     Cardiac Monitoring: ACTIVE order. Indication: Tachyarrhythmias, acute (48 hours)  Code Status: Full Code      Clinically Significant Risk Factors        # Hypokalemia: Lowest K = 3 mmol/L in last 2 days, will replace as needed       # Hypoalbuminemia: Lowest albumin = 3.1 g/dL at 6/3/2024  6:56 AM, will monitor as appropriate            # Obesity: Estimated body mass index is 30.29 kg/m  as calculated from the following:    Height as of this encounter: 1.778 m (5' 10\").    Weight as of this encounter: 95.8 kg (211 lb 1.6 oz).             Disposition Plan     Medically Ready for Discharge: Expect in the hospital likely 2-3 more days, pending slow improvement in bowel function.       I discussed with patient, with his son at bedside, all their questions answered.  Kodi Lowe MD  Hospitalist Service  [facility]  Securely message with [employer] (more info)  Text page via [employer] Paging/Directory   ______________________________________________________________________    Interval History   Patient seen and examined, his son stated he had about 4 bowel movements overnight, the frequency is decreasing.  No other issues, labs, medications reviewed.  His son at the bedside, ambulating the patient. No nausea or vomiting, tolerating oral intake but feels full quickly.  He continued to have liquid BMs.  Appetite is okay.  No significant abdominal pain    Physical Exam   Vital Signs: Temp: 97.9  F (36.6  C) Temp src: Temporal BP: 139/88 Pulse: 86   Resp: 16 SpO2: 99 % O2 Device: " None (Room air)    Weight: 211 lbs 1.6 oz    GEN:  Alert, oriented, appears ill but comfortable, no overt distress.  HEENT:  Normocephalic/atraumatic, no scleral icterus, no nasal discharge, mouth moist.  CV:  Regular rate and rhythm, no murmur or JVD.  S1 + S2 noted, no S3 or S4.  LUNGS:  Clear to auscultation bilaterally without rales/rhonchi/wheezing/retractions.  Mildly decreased breath sounds bases.  Symmetric chest rise on inhalation noted.  ABD:  Hypoactive bowel sounds, soft, non-tender,  moderately distended.  No guarding.  EXT:  Trace edema.  No cyanosis.  No new joint synovitis noted.  SKIN:  Dry to touch, no new exanthems noted in the visualized areas.    Medical Decision Making       Over 45 MINUTES SPENT BY ME on the date of service doing chart review, history, exam, documentation & further activities per the note.      Data   Medications   Current Facility-Administered Medications   Medication Dose Route Frequency Provider Last Rate Last Admin    0.9% sodium chloride + KCl 20 mEq/L infusion   Intravenous Continuous Kodi Lowe MD 75 mL/hr at 06/07/24 1312 New Bag at 06/07/24 1312     Current Facility-Administered Medications   Medication Dose Route Frequency Provider Last Rate Last Admin    acetaminophen (TYLENOL) tablet 975 mg  975 mg Oral Q8H Bienvenido Turner DO   975 mg at 06/06/24 1031    Or    acetaminophen (TYLENOL) Suppository 650 mg  650 mg Rectal Q8H Bienvenido Turner DO        apixaban ANTICOAGULANT (ELIQUIS) tablet 5 mg  5 mg Oral BID Kodi Lowe MD   5 mg at 06/07/24 0859    [Held by provider] magnesium oxide (MAG-OX) tablet 400 mg  400 mg Oral Q4H Bienvenido Turner DO   400 mg at 06/07/24 0859    metoprolol tartrate (LOPRESSOR) tablet 25 mg  25 mg Oral BID Bienvenido Turner DO   25 mg at 06/07/24 0859    pantoprazole (PROTONIX) EC tablet 40 mg  40 mg Oral BID AC Bienvenido Turner DO   40 mg at 06/07/24 0859    sodium chloride (PF) 0.9% PF flush 3 mL  3 mL Intracatheter  Q8H Poonam Ramsey PA-C   3 mL at 06/07/24 1107    tamsulosin (FLOMAX) capsule 0.4 mg  0.4 mg Oral At Bedtime Poonam Ramsey PA-C   0.4 mg at 06/06/24 2104     Labs and Imaging results below reviewed today.  Recent Labs   Lab 06/06/24  0657 06/04/24  0652 06/03/24  1005 06/03/24  0656   WBC 6.3  --  4.3 3.9*   HGB 12.9*  --  12.9* 13.1*   HCT 36.1*  --  38.0* 38.4*   MCV 91  --  94 94    168 169 168     Recent Labs   Lab 06/07/24  0559 06/06/24  0657 06/06/24  0201 06/05/24  2021 06/05/24  1333 06/05/24  0632 06/04/24  1511 06/04/24  0652 06/03/24  0656 06/02/24  0547 06/01/24  1657 06/01/24  1657   NA  --   --   --   --   --   --   --  142 142 141  --  138   POTASSIUM 3.0*  --  3.6 3.2*   < > 3.1*   < > 3.3* 3.7 3.8  --  4.7   CHLORIDE  --   --   --   --   --   --   --  111* 109* 106  --  102   CO2  --   --   --   --   --   --   --  18* 22 22  --  22   ANIONGAP  --   --   --   --   --   --   --  13 11 13  --  14   GLC  --   --   --   --   --   --   --  87 106* 140*  --  164*   BUN  --   --   --   --   --   --   --  29.2* 32.3* 26.3*  --  24.7*   CR  --   --   --   --   --   --   --  0.82 0.90 0.70  --  0.81   GFRESTIMATED  --   --   --   --   --   --   --  88 85 >90  --  88   REN  --   --   --   --   --   --   --  7.8* 7.9* 8.2*  --  8.6*   MAG 2.0 1.8  --   --   --  1.8  --  2.0 2.0 2.0   < >  --    PHOS  --   --   --   --   --   --   --   --   --  3.2  --   --    PROTTOTAL  --   --   --   --   --   --   --   --  5.5* 6.1*  --  6.3*   ALBUMIN  --   --   --   --   --   --   --   --  3.1* 3.2*  --  3.4*   BILITOTAL  --   --   --   --   --   --   --   --  1.3* 1.4*  --  1.4*   ALKPHOS  --   --   --   --   --   --   --   --  56 69  --  77   AST  --   --   --   --   --   --   --   --  19 16  --  10   ALT  --   --   --   --   --   --   --   --  12 12  --  13    < > = values in this interval not displayed.     No results found for this or any previous visit (from the past 24 hour(s)).

## 2024-06-07 NOTE — PROGRESS NOTES
"[facility]  General Surgery Progress Note    SUBJECTIVE:  Tolerating low fiber diet per chart    OBJECTIVE:  BP (!) 136/91 (BP Location: Right arm, Patient Position: Semi-Fuller's)   Pulse 86   Temp 97.8  F (36.6  C) (Temporal)   Resp 20   Ht 1.778 m (5' 10\")   Wt 95.8 kg (211 lb 1.6 oz)   SpO2 98%   BMI 30.29 kg/m         ASSESSMENT/PLAN:  Ok to discharge from surgical perspective.      Nova Edwards PA-C            "

## 2024-06-07 NOTE — PLAN OF CARE
"Goal Outcome Evaluation:      Plan of Care Reviewed With: patient, child    Overall Patient Progress: improvingOverall Patient Progress: improving    Outcome Evaluation: tolderated low fiber diet; denies pain; up independently in room; walked in the rob; poss d/c 6/7 with son      Problem: Adult Inpatient Plan of Care  Goal: Plan of Care Review  Description: The Plan of Care Review/Shift note should be completed every shift.  The Outcome Evaluation is a brief statement about your assessment that the patient is improving, declining, or no change.  This information will be displayed automatically on your shift  note.  Outcome: Progressing  Flowsheets (Taken 6/7/2024 0401)  Outcome Evaluation:   tolderated low fiber diet   denies pain   up independently in room   walked in the rob   poss d/c 6/7 with son  Plan of Care Reviewed With:   patient   child  Overall Patient Progress: improving  Goal: Patient-Specific Goal (Individualized)  Description: You can add care plan individualizations to a care plan. Examples of Individualization might be:  \"Parent requests to be called daily at 9am for status\", \"I have a hard time hearing out of my right ear\", or \"Do not touch me to wake me up as it startles  me\".  Outcome: Progressing  Goal: Absence of Hospital-Acquired Illness or Injury  Outcome: Progressing  Intervention: Identify and Manage Fall Risk  Recent Flowsheet Documentation  Taken 6/6/2024 2104 by Jose Fritz RN  Safety Promotion/Fall Prevention:   clutter free environment maintained   increased rounding and observation   lighting adjusted   nonskid shoes/slippers when out of bed   room near nurse's station  Intervention: Prevent Skin Injury  Recent Flowsheet Documentation  Taken 6/6/2024 2104 by Jose Fritz RN  Body Position: position changed independently  Intervention: Prevent and Manage VTE (Venous Thromboembolism) Risk  Recent Flowsheet Documentation  Taken 6/6/2024 2104 by Jose Fritz, KARINA  VTE " Prevention/Management: SCDs (sequential compression devices) off  Intervention: Prevent Infection  Recent Flowsheet Documentation  Taken 6/6/2024 2104 by Jose Fritz RN  Infection Prevention:   single patient room provided   rest/sleep promoted  Goal: Optimal Comfort and Wellbeing  Outcome: Progressing  Intervention: Monitor Pain and Promote Comfort  Recent Flowsheet Documentation  Taken 6/6/2024 2104 by Jose Fritz RN  Pain Management Interventions: declines  Goal: Readiness for Transition of Care  Outcome: Progressing     Problem: Intestinal Obstruction  Goal: Optimal Bowel Function  Outcome: Progressing  Intervention: Promote Bowel Function  Recent Flowsheet Documentation  Taken 6/6/2024 2104 by Jose Fritz RN  Body Position: position changed independently  Head of Bed (HOB) Positioning: HOB at 30-45 degrees  Positioning/Transfer Devices:   pillows   in use  Goal: Fluid and Electrolyte Balance  Outcome: Progressing  Goal: Absence of Infection Signs and Symptoms  Outcome: Progressing  Goal: Optimize Nutrition Status  Outcome: Progressing  Goal: Optimal Pain Control and Function  Outcome: Progressing  Intervention: Prevent or Manage Pain  Recent Flowsheet Documentation  Taken 6/6/2024 2104 by Jose Fritz RN  Pain Management Interventions: declines     Problem: Skin Injury Risk Increased  Goal: Skin Health and Integrity  Outcome: Progressing  Intervention: Plan: Nurse Driven Intervention: Moisture Management  Recent Flowsheet Documentation  Taken 6/6/2024 2000 by Jose Fritz RN  Moisture Interventions: Encourage regular toileting  Bathing/Skin Care: linen changed  Intervention: Optimize Skin Protection  Recent Flowsheet Documentation  Taken 6/6/2024 2104 by Jose Fritz RN  Activity Management: activity adjusted per tolerance  Head of Bed (HOB) Positioning: HOB at 30-45 degrees  Taken 6/6/2024 2045 by Jose Fritz RN  Activity Management: ambulated outside room

## 2024-06-07 NOTE — PROGRESS NOTES
Care Management Discharge Note    Discharge Date: 06/07/2024       Discharge Disposition: Home    Discharge Transportation: family or friend will provide    Patient/Family in Agreement with the Plan: yes    Additional Information:  Pt has no discharge needs identified.  SW signing off.    LEON Holliday, [facility]  Inpatient Care Coordination  [facility]  [phone]      [facility] LYONS [facility]

## 2024-06-08 ENCOUNTER — APPOINTMENT (OUTPATIENT)
Dept: GENERAL RADIOLOGY | Facility: CLINIC | Age: 84
DRG: 394 | End: 2024-06-08
Attending: INTERNAL MEDICINE
Payer: MEDICARE

## 2024-06-08 LAB
ANION GAP SERPL CALCULATED.3IONS-SCNC: 13 MMOL/L (ref 7–15)
BUN SERPL-MCNC: 14.6 MG/DL (ref 8–23)
CALCIUM SERPL-MCNC: 8.1 MG/DL (ref 8.8–10.2)
CHLORIDE SERPL-SCNC: 114 MMOL/L (ref 98–107)
CREAT SERPL-MCNC: 0.77 MG/DL (ref 0.67–1.17)
DEPRECATED HCO3 PLAS-SCNC: 17 MMOL/L (ref 22–29)
EGFRCR SERPLBLD CKD-EPI 2021: 89 ML/MIN/1.73M2
GLUCOSE SERPL-MCNC: 110 MG/DL (ref 70–99)
HOLD SPECIMEN: NORMAL
MAGNESIUM SERPL-MCNC: 1.9 MG/DL (ref 1.7–2.3)
POTASSIUM SERPL-SCNC: 3.5 MMOL/L (ref 3.4–5.3)
POTASSIUM SERPL-SCNC: 3.5 MMOL/L (ref 3.4–5.3)
SODIUM SERPL-SCNC: 144 MMOL/L (ref 135–145)

## 2024-06-08 PROCEDURE — 84132 ASSAY OF SERUM POTASSIUM: CPT | Performed by: STUDENT IN AN ORGANIZED HEALTH CARE EDUCATION/TRAINING PROGRAM

## 2024-06-08 PROCEDURE — 36415 COLL VENOUS BLD VENIPUNCTURE: CPT | Performed by: STUDENT IN AN ORGANIZED HEALTH CARE EDUCATION/TRAINING PROGRAM

## 2024-06-08 PROCEDURE — 74019 RADEX ABDOMEN 2 VIEWS: CPT

## 2024-06-08 PROCEDURE — 83735 ASSAY OF MAGNESIUM: CPT | Performed by: STUDENT IN AN ORGANIZED HEALTH CARE EDUCATION/TRAINING PROGRAM

## 2024-06-08 PROCEDURE — 250N000013 HC RX MED GY IP 250 OP 250 PS 637: Performed by: INTERNAL MEDICINE

## 2024-06-08 PROCEDURE — 80048 BASIC METABOLIC PNL TOTAL CA: CPT | Performed by: INTERNAL MEDICINE

## 2024-06-08 PROCEDURE — 250N000013 HC RX MED GY IP 250 OP 250 PS 637: Performed by: PHYSICIAN ASSISTANT

## 2024-06-08 PROCEDURE — 99232 SBSQ HOSP IP/OBS MODERATE 35: CPT | Performed by: INTERNAL MEDICINE

## 2024-06-08 PROCEDURE — 250N000011 HC RX IP 250 OP 636: Mod: JZ | Performed by: INTERNAL MEDICINE

## 2024-06-08 PROCEDURE — 120N000001 HC R&B MED SURG/OB

## 2024-06-08 PROCEDURE — 250N000013 HC RX MED GY IP 250 OP 250 PS 637: Performed by: STUDENT IN AN ORGANIZED HEALTH CARE EDUCATION/TRAINING PROGRAM

## 2024-06-08 RX ORDER — MAGNESIUM OXIDE 400 MG/1
400 TABLET ORAL EVERY 4 HOURS
Status: COMPLETED | OUTPATIENT
Start: 2024-06-08 | End: 2024-06-08

## 2024-06-08 RX ORDER — POTASSIUM CHLORIDE 1500 MG/1
20 TABLET, EXTENDED RELEASE ORAL ONCE
Status: COMPLETED | OUTPATIENT
Start: 2024-06-08 | End: 2024-06-08

## 2024-06-08 RX ADMIN — APIXABAN 5 MG: 5 TABLET, FILM COATED ORAL at 20:32

## 2024-06-08 RX ADMIN — ACETAMINOPHEN 975 MG: 325 TABLET, FILM COATED ORAL at 09:14

## 2024-06-08 RX ADMIN — ACETAMINOPHEN 975 MG: 325 TABLET, FILM COATED ORAL at 01:10

## 2024-06-08 RX ADMIN — TAMSULOSIN HYDROCHLORIDE 0.4 MG: 0.4 CAPSULE ORAL at 20:32

## 2024-06-08 RX ADMIN — METOPROLOL TARTRATE 25 MG: 25 TABLET, FILM COATED ORAL at 09:14

## 2024-06-08 RX ADMIN — Medication 400 MG: at 09:15

## 2024-06-08 RX ADMIN — APIXABAN 5 MG: 5 TABLET, FILM COATED ORAL at 09:15

## 2024-06-08 RX ADMIN — POTASSIUM CHLORIDE AND SODIUM CHLORIDE: 900; 150 INJECTION, SOLUTION INTRAVENOUS at 01:55

## 2024-06-08 RX ADMIN — PANTOPRAZOLE SODIUM 40 MG: 40 TABLET, DELAYED RELEASE ORAL at 06:43

## 2024-06-08 RX ADMIN — Medication 400 MG: at 14:26

## 2024-06-08 RX ADMIN — METOPROLOL TARTRATE 25 MG: 25 TABLET, FILM COATED ORAL at 20:32

## 2024-06-08 RX ADMIN — PANTOPRAZOLE SODIUM 40 MG: 40 TABLET, DELAYED RELEASE ORAL at 17:13

## 2024-06-08 RX ADMIN — POTASSIUM CHLORIDE 20 MEQ: 1500 TABLET, EXTENDED RELEASE ORAL at 09:14

## 2024-06-08 ASSESSMENT — ACTIVITIES OF DAILY LIVING (ADL)
ADLS_ACUITY_SCORE: 26
ADLS_ACUITY_SCORE: 26
ADLS_ACUITY_SCORE: 30
ADLS_ACUITY_SCORE: 29
ADLS_ACUITY_SCORE: 26
ADLS_ACUITY_SCORE: 26
ADLS_ACUITY_SCORE: 29
ADLS_ACUITY_SCORE: 26
ADLS_ACUITY_SCORE: 26
ADLS_ACUITY_SCORE: 30
ADLS_ACUITY_SCORE: 29
ADLS_ACUITY_SCORE: 29
ADLS_ACUITY_SCORE: 26
ADLS_ACUITY_SCORE: 30
ADLS_ACUITY_SCORE: 30
ADLS_ACUITY_SCORE: 26
ADLS_ACUITY_SCORE: 26
ADLS_ACUITY_SCORE: 30
ADLS_ACUITY_SCORE: 30
ADLS_ACUITY_SCORE: 26
ADLS_ACUITY_SCORE: 26

## 2024-06-08 NOTE — PLAN OF CARE
"Pt A&Ox4. Denies pain. Ambulating halls frequently. Up ad mary when son at bedside. TLSO brace on OOB. SBA. IVF discontinued. Multiple loose Bms, abdominal xray ordered - results pending. On high K and Mag protocol - replaced and recheck in AM. Discharge TBD.       Goal Outcome Evaluation:      Plan of Care Reviewed With: patient    Overall Patient Progress: improvingOverall Patient Progress: improving    Outcome Evaluation: multiple loose BMs, xray abd, tele Afib CVR      Problem: Adult Inpatient Plan of Care  Goal: Plan of Care Review  Description: The Plan of Care Review/Shift note should be completed every shift.  The Outcome Evaluation is a brief statement about your assessment that the patient is improving, declining, or no change.  This information will be displayed automatically on your shift  note.  Outcome: Progressing  Flowsheets (Taken 6/8/2024 1352)  Outcome Evaluation: multiple loose BMs, xray abd, tele Afib CVR  Plan of Care Reviewed With: patient  Overall Patient Progress: improving  Goal: Patient-Specific Goal (Individualized)  Description: You can add care plan individualizations to a care plan. Examples of Individualization might be:  \"Parent requests to be called daily at 9am for status\", \"I have a hard time hearing out of my right ear\", or \"Do not touch me to wake me up as it startles  me\".  Outcome: Progressing  Goal: Absence of Hospital-Acquired Illness or Injury  Outcome: Progressing  Intervention: Identify and Manage Fall Risk  Recent Flowsheet Documentation  Taken 6/8/2024 0922 by Yvrose Vazquez, RN  Safety Promotion/Fall Prevention: safety round/check completed  Taken 6/8/2024 0730 by Yvrose Vazquez, RN  Safety Promotion/Fall Prevention: safety round/check completed  Goal: Optimal Comfort and Wellbeing  Outcome: Progressing  Goal: Readiness for Transition of Care  Outcome: Progressing     "

## 2024-06-08 NOTE — PROGRESS NOTES
[facility]  Medicine Progress Note - Hospitalist Service  Date of Admission:  5/31/2024    Assessment & Plan   Eddie Peralta is a 84 year old male with a history of Cataract, Arthritis, Atrial Fibrillation, GERD, HLD, BPH, DDD, Spinal Stenosis who presents to the emergency department for evaluation of constipation and abdominal pain following a spinal fusion of L4 and L5 on 5/28 at [facility].      CT A/P on presentation showed mildly dilated ileal small bowel loops and ascending colon with tapering to non-dilated transverse/distal colon consistent with ileus.  He was monitored but unfortunately worsened over 24-48 hours.  XR showed worsened dilation of small bowel loops and lactic climbed.  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      General surgery & [facility] are following.  Patient has had some relief with tap water enema but remains very nauseated and distended.  Hospital course complicated by afib with RVR.        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  Last BM on 5/29.  Using Oxycodone for pain control after his spine surgery on 5/28.  On arrival, CT abd/pelvis reveals a mildly dilated distal ileal small bowel loops and ascending colon with tapering to nondilated transverse/distal colon consistent with ileus.  No discrete transition point.  Abd XR on AM 6/1 shows worsening.  Repeat CT on 6/1 shows increasingly distended cecum - now at 9.5 cm.  Discussed with surgery & [facility] - consideration for early shadi's (although cecum is generally >12 cm with this).  GI recommended tap water enema which helped with abdominal pain but patient has ongoing nausea and abdominal distention.   -General surgery & [facility] following, appreciate recs  -If patient worsens, will need to further consider neostigmine  -Continue gentle IV fluid hydration, liquid diet, analgesics prn; try to avoid  narcotics  -For pain control, the patient has IV dilaudid, scheduled tylenol  -Continue PPI.    Today 6/8.  -Overnight to early this morning, he had about 8 loose bowel movements in 12 hours.  -He also felt slightly worse abdominal pain early last night which later improved.  -No nausea or vomiting, tolerating low fiber diet, advised to eat small amount and more frequent..  -Magnesium oxide was discontinued due to frequent loose stool, but so far did not help to slow down the frequency of stooling.  -Use magnesium sulfate for hypomagnesemia.  -Continue to ambulate the patient 3-4 times a day on the hallway.  -Abdomen is distended but slowly improving.  -Electrolytes are being replaced per protocol.  -Continue to monitor off antibiotics.  -Avoid opioids as much as possible.  -Currently on Eliquis, started on 6/5 and so far tolerating.  -Surgery and GI input appreciated.  -If abdominal distention improve, pain is better, tolerates oral intake, and the frequency of the diarrhea improves, he can be discharged  in 2 days.  -If patient does not tolerate advanced diet, consider reconsulting  GI to reevaluate the patient.  -Abdominal x-ray done today reported as: Moderate gas distention of the bowel is again noted. The amount of gas in the colon has decreased since the previous exam, however, there is gas noted to the level of the rectum. No significant change in the moderate gas distention of the small bowel. Air-fluid levels are visualized in the small bowel on the upright film. Interval removal of the enteric tube. Surgical changes lumbar spine. Continued surveillance is recommended.    Chronic Afib with RVR episode during this hospital stay:  -Diagnosed 2022 s/p Cardioversion 7/2022 with recurrence shortly after.    -No hx of obstructive CAD or CHF per last Cardiology note 5/2023.    -Has been on IV fluid as he was n.p.o., will consider stopping it if he tolerates oral intake .  -Not on any medication for rate  "control.  -Early during this hospitalization, his HR was well controlled.    -Unfortunately, this has slowly increased in the setting of pain/nausea and then jumped to 150-170s after ambulating on 6/2.  Cardiac telemetry is consistent with afib.  -metoprolol tartrate 25 mg BID  -K goal >4, Mag goal >2  -Restarted Eliquis 6/5, heparin discontinued .  -CHADSVasc score within the anticoagulation range.       Lumbar Spinal Stenosis  S/p Transfacet/Transforaminal L4 to: L5 Posterior Spine Fusion with Instrumentation L4 to: L5 Transforaminal Lumbar Interbody Fusion L4 to: L5 on 5/28/24 at [facility]   -Continue brace and post op lifting and bending restrictions  -PT/SW consult      HLD  - resume pta Atorvastatin when taking po     GERD  Hx Conti's Esophagus  -Continue IV Protonix, will resume his PTA dose when oral intake improves     BPH  -Continue Flomax as able with GI issues and monitor PVR         Diet: Low Fiber Diet  Snacks/Supplements Adult: Ensure Enlive; With Meals    DVT Prophylaxis: Heparin drip  Tse Catheter: Not present  Lines: None     Cardiac Monitoring: ACTIVE order. Indication: Tachyarrhythmias, acute (48 hours)  Code Status: Full Code      Clinically Significant Risk Factors        # Hypokalemia: Lowest K = 3 mmol/L in last 2 days, will replace as needed       # Hypoalbuminemia: Lowest albumin = 3.1 g/dL at 6/3/2024  6:56 AM, will monitor as appropriate            # Obesity: Estimated body mass index is 30.29 kg/m  as calculated from the following:    Height as of this encounter: 1.778 m (5' 10\").    Weight as of this encounter: 95.8 kg (211 lb 1.6 oz).             Disposition Plan     Medically Ready for Discharge: Expect in the hospital likely 2-3 more days, pending slow improvement in bowel function.       I discussed with patient, and with his son at length at bedside, all their questions answered.  Kodi Lowe MD  Hospitalist Service  [facility]  Securely message with " [place] (more info)  Text page via [facility] Paging/Directory   ______________________________________________________________________    Interval History   Patient seen and examined.  He had about 8 bowel movements in 12 hours, and is mostly watery and liquidy.  There is no associated nausea.  Tolerating oral intake, feels discomfort few hours after eating. Labs, medications reviewed.  His son at the bedside. No nausea or vomiting.  He continued to have liquid BMs.  Abdominal discomfort, slowly improving.     Physical Exam   Vital Signs: Temp: 97.4  F (36.3  C) Temp src: Oral BP: 123/65 Pulse: 77   Resp: 18 SpO2: 95 % O2 Device: None (Room air)    Weight: 211 lbs 1.6 oz    GEN:  Alert, oriented, appears ill but comfortable, no overt distress.  HEENT:  Normocephalic/atraumatic, no scleral icterus, no nasal discharge, mouth moist.  CV:  Regular rate and rhythm, no murmur or JVD.  S1 + S2 noted, no S3 or S4.  LUNGS:  Clear to auscultation bilaterally without rales/rhonchi/wheezing/retractions.  Mildly decreased breath sounds bases.  Symmetric chest rise on inhalation noted.  ABD:  Hypoactive bowel sounds, soft, non-tender,  moderately distended, has abdominal binder.  No guarding.  EXT:  Trace edema.  No cyanosis.  No new joint synovitis noted.  SKIN:  Dry to touch, no new exanthems noted in the visualized areas.    Medical Decision Making       Over 40 MINUTES SPENT BY ME on the date of service doing chart review, history, exam, documentation & further activities per the note.      Data   Medications   Current Facility-Administered Medications   Medication Dose Route Frequency Provider Last Rate Last Admin     Current Facility-Administered Medications   Medication Dose Route Frequency Provider Last Rate Last Admin    acetaminophen (TYLENOL) tablet 975 mg  975 mg Oral Q8H Bienvenido Turner DO   975 mg at 06/08/24 0914    Or    acetaminophen (TYLENOL) Suppository 650 mg  650 mg Rectal Q8H Bienvenido Turner DO        apixaban  ANTICOAGULANT (ELIQUIS) tablet 5 mg  5 mg Oral BID Kodi Lowe MD   5 mg at 06/08/24 0915    metoprolol tartrate (LOPRESSOR) tablet 25 mg  25 mg Oral BID Bienvenido Turner DO   25 mg at 06/08/24 0914    pantoprazole (PROTONIX) EC tablet 40 mg  40 mg Oral BID AC Bienvenido Turner DO   40 mg at 06/08/24 0643    sodium chloride (PF) 0.9% PF flush 3 mL  3 mL Intracatheter Q8H Poonam Ramsey PA-C   3 mL at 06/07/24 1107    tamsulosin (FLOMAX) capsule 0.4 mg  0.4 mg Oral At Bedtime Poonam Ramesy PA-C   0.4 mg at 06/07/24 2046     Labs and Imaging results below reviewed today.  Recent Labs   Lab 06/06/24  0657 06/04/24  0652 06/03/24  1005 06/03/24  0656   WBC 6.3  --  4.3 3.9*   HGB 12.9*  --  12.9* 13.1*   HCT 36.1*  --  38.0* 38.4*   MCV 91  --  94 94    168 169 168     Recent Labs   Lab 06/08/24  0720 06/07/24  1450 06/07/24  0559 06/06/24  0657 06/04/24  1511 06/04/24  0652 06/03/24  0656 06/02/24  0547 06/01/24  1657 06/01/24  1657     --   --   --   --  142 142 141  --  138   POTASSIUM 3.5  3.5 3.6 3.0*  --    < > 3.3* 3.7 3.8  --  4.7   CHLORIDE 114*  --   --   --   --  111* 109* 106  --  102   CO2 17*  --   --   --   --  18* 22 22  --  22   ANIONGAP 13  --   --   --   --  13 11 13  --  14   *  --   --   --   --  87 106* 140*  --  164*   BUN 14.6  --   --   --   --  29.2* 32.3* 26.3*  --  24.7*   CR 0.77  --   --   --   --  0.82 0.90 0.70  --  0.81   GFRESTIMATED 89  --   --   --   --  88 85 >90  --  88   REN 8.1*  --   --   --   --  7.8* 7.9* 8.2*  --  8.6*   MAG 1.9  --  2.0 1.8   < > 2.0 2.0 2.0   < >  --    PHOS  --   --   --   --   --   --   --  3.2  --   --    PROTTOTAL  --   --   --   --   --   --  5.5* 6.1*  --  6.3*   ALBUMIN  --   --   --   --   --   --  3.1* 3.2*  --  3.4*   BILITOTAL  --   --   --   --   --   --  1.3* 1.4*  --  1.4*   ALKPHOS  --   --   --   --   --   --  56 69  --  77   AST  --   --   --   --   --   --  19 16  --  10   ALT  --   --   --   --   --    --  12 12  --  13    < > = values in this interval not displayed.     Recent Results (from the past 24 hour(s))   XR Abdomen 2 Views    Narrative    EXAM: XR ABDOMEN 2 VIEWS  LOCATION: [facility]  DATE: 06/08/2024    INDICATION: Bowel obstruction, ileus, abdominal distension.  COMPARISON: Abdominal film 06/02/2024.      Impression    IMPRESSION: Moderate gas distention of the bowel is again noted. The amount of gas in the colon has decreased since the previous exam, however, there is gas noted to the level of the rectum. No significant change in the moderate gas distention of the   small bowel. Air-fluid levels are visualized in the small bowel on the upright film. Interval removal of the enteric tube. Surgical changes lumbar spine. Continued surveillance is recommended.

## 2024-06-08 NOTE — PLAN OF CARE
"Goal Outcome Evaluation:      Plan of Care Reviewed With: patient, child    Overall Patient Progress: improvingOverall Patient Progress: improving       Care [id number]  Pt Expierenced multiple lose stools this shift and previous shift.   RA, independent in room with son. NS +k20meq infused 75 ml/hr. Tele Controlled Afib 80s. No Prn medications given. Possible discharge home with son in 1-3 days.  Bowel Movements at 23:20, 0005, 0220, 0642    Problem: Adult Inpatient Plan of Care  Goal: Plan of Care Review  Description: The Plan of Care Review/Shift note should be completed every shift.  The Outcome Evaluation is a brief statement about your assessment that the patient is improving, declining, or no change.  This information will be displayed automatically on your shift  note.  Outcome: Progressing  Flowsheets (Taken 6/8/2024 0514)  Plan of Care Reviewed With:   patient   child  Overall Patient Progress: improving  Goal: Patient-Specific Goal (Individualized)  Description: You can add care plan individualizations to a care plan. Examples of Individualization might be:  \"Parent requests to be called daily at 9am for status\", \"I have a hard time hearing out of my right ear\", or \"Do not touch me to wake me up as it startles  me\".  Outcome: Progressing  Goal: Absence of Hospital-Acquired Illness or Injury  Outcome: Progressing  Intervention: Identify and Manage Fall Risk  Recent Flowsheet Documentation  Taken 6/8/2024 0300 by Walker Hoyt, RN  Safety Promotion/Fall Prevention:   activity supervised   assistive device/personal items within reach   safety round/check completed   nonskid shoes/slippers when out of bed   lighting adjusted   clutter free environment maintained  Intervention: Prevent Skin Injury  Recent Flowsheet Documentation  Taken 6/8/2024 0300 by Walker Hoyt, RN  Body Position: position changed independently  Device Skin Pressure Protection: absorbent pad utilized/changed  Intervention: Prevent and " Manage VTE (Venous Thromboembolism) Risk  Recent Flowsheet Documentation  Taken 6/8/2024 0300 by Walker Hoyt RN  VTE Prevention/Management: SCDs (sequential compression devices) off  Intervention: Prevent Infection  Recent Flowsheet Documentation  Taken 6/8/2024 0300 by Walker Hoyt RN  Infection Prevention:   rest/sleep promoted   personal protective equipment utilized   hand hygiene promoted   single patient room provided  Goal: Optimal Comfort and Wellbeing  Outcome: Progressing  Intervention: Monitor Pain and Promote Comfort  Recent Flowsheet Documentation  Taken 6/8/2024 0110 by Walker Hoyt RN  Pain Management Interventions: declines  Goal: Readiness for Transition of Care  Outcome: Progressing

## 2024-06-09 LAB
ANION GAP SERPL CALCULATED.3IONS-SCNC: 13 MMOL/L (ref 7–15)
BUN SERPL-MCNC: 11.4 MG/DL (ref 8–23)
CALCIUM SERPL-MCNC: 8.4 MG/DL (ref 8.8–10.2)
CHLORIDE SERPL-SCNC: 106 MMOL/L (ref 98–107)
CREAT SERPL-MCNC: 0.8 MG/DL (ref 0.67–1.17)
DEPRECATED HCO3 PLAS-SCNC: 19 MMOL/L (ref 22–29)
EGFRCR SERPLBLD CKD-EPI 2021: 88 ML/MIN/1.73M2
GLUCOSE SERPL-MCNC: 103 MG/DL (ref 70–99)
HOLD SPECIMEN: NORMAL
MAGNESIUM SERPL-MCNC: 1.8 MG/DL (ref 1.7–2.3)
POTASSIUM SERPL-SCNC: 3.3 MMOL/L (ref 3.4–5.3)
POTASSIUM SERPL-SCNC: 3.3 MMOL/L (ref 3.4–5.3)
POTASSIUM SERPL-SCNC: 3.4 MMOL/L (ref 3.4–5.3)
POTASSIUM SERPL-SCNC: 4.4 MMOL/L (ref 3.4–5.3)
SODIUM SERPL-SCNC: 138 MMOL/L (ref 135–145)

## 2024-06-09 PROCEDURE — 250N000013 HC RX MED GY IP 250 OP 250 PS 637: Performed by: INTERNAL MEDICINE

## 2024-06-09 PROCEDURE — 250N000011 HC RX IP 250 OP 636: Mod: JZ | Performed by: INTERNAL MEDICINE

## 2024-06-09 PROCEDURE — 36415 COLL VENOUS BLD VENIPUNCTURE: CPT | Performed by: INTERNAL MEDICINE

## 2024-06-09 PROCEDURE — 83735 ASSAY OF MAGNESIUM: CPT | Performed by: INTERNAL MEDICINE

## 2024-06-09 PROCEDURE — 250N000013 HC RX MED GY IP 250 OP 250 PS 637: Performed by: PHYSICIAN ASSISTANT

## 2024-06-09 PROCEDURE — 120N000001 HC R&B MED SURG/OB

## 2024-06-09 PROCEDURE — 84132 ASSAY OF SERUM POTASSIUM: CPT | Performed by: INTERNAL MEDICINE

## 2024-06-09 PROCEDURE — 80048 BASIC METABOLIC PNL TOTAL CA: CPT | Performed by: INTERNAL MEDICINE

## 2024-06-09 PROCEDURE — 99232 SBSQ HOSP IP/OBS MODERATE 35: CPT | Performed by: INTERNAL MEDICINE

## 2024-06-09 PROCEDURE — 250N000013 HC RX MED GY IP 250 OP 250 PS 637: Performed by: STUDENT IN AN ORGANIZED HEALTH CARE EDUCATION/TRAINING PROGRAM

## 2024-06-09 RX ORDER — MAGNESIUM SULFATE HEPTAHYDRATE 40 MG/ML
2 INJECTION, SOLUTION INTRAVENOUS ONCE
Status: COMPLETED | OUTPATIENT
Start: 2024-06-09 | End: 2024-06-09

## 2024-06-09 RX ORDER — POTASSIUM CHLORIDE 1.5 G/1.58G
40 POWDER, FOR SOLUTION ORAL ONCE
Status: COMPLETED | OUTPATIENT
Start: 2024-06-09 | End: 2024-06-09

## 2024-06-09 RX ORDER — POTASSIUM CHLORIDE 1500 MG/1
40 TABLET, EXTENDED RELEASE ORAL ONCE
Status: DISCONTINUED | OUTPATIENT
Start: 2024-06-09 | End: 2024-06-09

## 2024-06-09 RX ADMIN — METOPROLOL TARTRATE 25 MG: 25 TABLET, FILM COATED ORAL at 20:16

## 2024-06-09 RX ADMIN — ACETAMINOPHEN 975 MG: 325 TABLET, FILM COATED ORAL at 23:52

## 2024-06-09 RX ADMIN — METOPROLOL TARTRATE 25 MG: 25 TABLET, FILM COATED ORAL at 09:44

## 2024-06-09 RX ADMIN — PANTOPRAZOLE SODIUM 40 MG: 40 TABLET, DELAYED RELEASE ORAL at 15:56

## 2024-06-09 RX ADMIN — TAMSULOSIN HYDROCHLORIDE 0.4 MG: 0.4 CAPSULE ORAL at 20:16

## 2024-06-09 RX ADMIN — ALUMINUM HYDROXIDE, MAGNESIUM HYDROXIDE, AND SIMETHICONE 30 ML: 1200; 120; 1200 SUSPENSION ORAL at 23:53

## 2024-06-09 RX ADMIN — POTASSIUM CHLORIDE FOR ORAL SOLUTION 40 MEQ: 1.5 POWDER, FOR SOLUTION ORAL at 09:46

## 2024-06-09 RX ADMIN — PANTOPRAZOLE SODIUM 40 MG: 40 TABLET, DELAYED RELEASE ORAL at 09:44

## 2024-06-09 RX ADMIN — POTASSIUM CHLORIDE FOR ORAL SOLUTION 40 MEQ: 1.5 POWDER, FOR SOLUTION ORAL at 16:06

## 2024-06-09 RX ADMIN — MAGNESIUM SULFATE HEPTAHYDRATE 2 G: 40 INJECTION, SOLUTION INTRAVENOUS at 09:37

## 2024-06-09 RX ADMIN — APIXABAN 5 MG: 5 TABLET, FILM COATED ORAL at 20:16

## 2024-06-09 RX ADMIN — APIXABAN 5 MG: 5 TABLET, FILM COATED ORAL at 09:44

## 2024-06-09 ASSESSMENT — ACTIVITIES OF DAILY LIVING (ADL)
ADLS_ACUITY_SCORE: 29
ADLS_ACUITY_SCORE: 30
ADLS_ACUITY_SCORE: 29
ADLS_ACUITY_SCORE: 30
ADLS_ACUITY_SCORE: 29
ADLS_ACUITY_SCORE: 29

## 2024-06-09 NOTE — PLAN OF CARE
"Outcome Evaluation: Abd still distended. Low pitched bowel sounds in all quadrants. Denied pain, N/V. Tolerating low fiber diet.       Plan of Care Reviewed With: patient, child    Overall Patient Progress: improvingOverall Patient Progress: improving        Problem: Adult Inpatient Plan of Care  Goal: Plan of Care Review  Description: The Plan of Care Review/Shift note should be completed every shift.  The Outcome Evaluation is a brief statement about your assessment that the patient is improving, declining, or no change.  This information will be displayed automatically on your shift  note.  Outcome: Progressing  Flowsheets (Taken 6/8/2024 2332)  Outcome Evaluation: Abd still distended. Low pitched bowel sounds in all quadrants. Denied pain, N/V.  Plan of Care Reviewed With:   patient   child  Overall Patient Progress: improving  Goal: Patient-Specific Goal (Individualized)  Description: You can add care plan individualizations to a care plan. Examples of Individualization might be:  \"Parent requests to be called daily at 9am for status\", \"I have a hard time hearing out of my right ear\", or \"Do not touch me to wake me up as it startles  me\".  Outcome: Progressing  Goal: Absence of Hospital-Acquired Illness or Injury  Outcome: Progressing  Intervention: Identify and Manage Fall Risk  Recent Flowsheet Documentation  Taken 6/8/2024 1736 by Dimple Miller, RN  Safety Promotion/Fall Prevention:   assistive device/personal items within reach   clutter free environment maintained   lighting adjusted   nonskid shoes/slippers when out of bed   safety round/check completed   increased rounding and observation   increase visualization of patient   mobility aid in reach   patient and family education   room organization consistent  Intervention: Prevent Skin Injury  Recent Flowsheet Documentation  Taken 6/8/2024 1736 by Dimple Miller, RN  Body Position: supine, head elevated  Skin Protection: adhesive use limited  Device " Skin Pressure Protection: absorbent pad utilized/changed  Intervention: Prevent and Manage VTE (Venous Thromboembolism) Risk  Recent Flowsheet Documentation  Taken 6/8/2024 1736 by Dimple Miller RN  VTE Prevention/Management: SCDs (sequential compression devices) off  Intervention: Prevent Infection  Recent Flowsheet Documentation  Taken 6/8/2024 1736 by Dimple Miller RN  Infection Prevention:   rest/sleep promoted   personal protective equipment utilized   hand hygiene promoted   single patient room provided  Goal: Optimal Comfort and Wellbeing  Outcome: Progressing  Goal: Readiness for Transition of Care  Outcome: Progressing     Problem: Intestinal Obstruction  Goal: Optimal Bowel Function  Outcome: Progressing  Intervention: Promote Bowel Function  Recent Flowsheet Documentation  Taken 6/8/2024 1736 by Dimple Miller RN  Body Position: supine, head elevated  Head of Bed (HOB) Positioning: HOB at 20-30 degrees  Positioning/Transfer Devices:   pillows   in use  Chair: Upright in chair  Goal: Fluid and Electrolyte Balance  Outcome: Progressing  Intervention: Monitor and Manage Hypovolemia  Recent Flowsheet Documentation  Taken 6/8/2024 1736 by Dimple Miller RN  Fluid/Electrolyte Management: fluids provided  Goal: Absence of Infection Signs and Symptoms  Outcome: Progressing  Intervention: Prevent or Manage Infection  Recent Flowsheet Documentation  Taken 6/8/2024 1736 by Dimple Miller RN  Infection Management: aseptic technique maintained  Goal: Optimize Nutrition Status  Outcome: Progressing  Goal: Optimal Pain Control and Function  Outcome: Progressing     Problem: Skin Injury Risk Increased  Goal: Skin Health and Integrity  Outcome: Progressing  Intervention: Plan: Nurse Driven Intervention: Moisture Management  Recent Flowsheet Documentation  Taken 6/8/2024 2000 by Dimple Miller RN  Moisture Interventions:   Encourage regular toileting   No brief in bed  Bathing/Skin Care:   dressed/undressed   linen  changed  Taken 6/8/2024 1736 by Dimple Miller, RN  Moisture Interventions: Encourage regular toileting  Plan: Moisture Management: encourage regular toileting  Intervention: Plan: Nurse Driven Intervention: Friction and Shear  Recent Flowsheet Documentation  Taken 6/8/2024 1736 by Dimple Miller, RN  Friction/Shear Interventions: HOB 30 degrees or less  Intervention: Optimize Skin Protection  Recent Flowsheet Documentation  Taken 6/8/2024 1736 by Dimple Miller, RN  Pressure Reduction Techniques: frequent weight shift encouraged  Skin Protection: adhesive use limited  Activity Management:   activity adjusted per tolerance   activity encouraged  Head of Bed (HOB) Positioning: HOB at 20-30 degrees     Problem: Electrolyte Imbalance  Goal: Electrolyte Balance  Outcome: Progressing  Intervention: Monitor and Manage Electrolyte Imbalance  Recent Flowsheet Documentation  Taken 6/8/2024 1736 by Dimple Miller, RN  Fluid/Electrolyte Management: fluids provided

## 2024-06-09 NOTE — PLAN OF CARE
"No pain. Eating small meals more often, low fiber diet. Son at bedside. No nausea. Good bowel sounds. Feels less bloated. BM are decreasing in numbers. Plan is home tomorrow with son.   Incision to posterior back open to air.   Up with brace on in the hallways with walker. Ambulates often independently with son.   Very pleasant.   Tele discontinued. Afib.   Mild edema to right leg/ankle.   Magnesium replaced. Mag lab in the AM.  Potassium replaced x2. Recheck at 2100 today.     Problem: Adult Inpatient Plan of Care  Goal: Plan of Care Review  Description: The Plan of Care Review/Shift note should be completed every shift.  The Outcome Evaluation is a brief statement about your assessment that the patient is improving, declining, or no change.  This information will be displayed automatically on your shift  note.  Outcome: Progressing  Flowsheets (Taken 6/9/2024 1600)  Plan of Care Reviewed With: patient  Overall Patient Progress: improving  Goal: Patient-Specific Goal (Individualized)  Description: You can add care plan individualizations to a care plan. Examples of Individualization might be:  \"Parent requests to be called daily at 9am for status\", \"I have a hard time hearing out of my right ear\", or \"Do not touch me to wake me up as it startles  me\".  Outcome: Progressing  Goal: Absence of Hospital-Acquired Illness or Injury  Outcome: Progressing  Intervention: Identify and Manage Fall Risk  Recent Flowsheet Documentation  Taken 6/9/2024 1211 by Hedy Middleton, RN  Safety Promotion/Fall Prevention:   assistive device/personal items within reach   increased rounding and observation   clutter free environment maintained   increase visualization of patient   lighting adjusted   mobility aid in reach   nonskid shoes/slippers when out of bed  Intervention: Prevent Skin Injury  Recent Flowsheet Documentation  Taken 6/9/2024 1211 by Hedy Middleton, RN  Body Position: position changed independently  Device " Skin Pressure Protection: absorbent pad utilized/changed  Intervention: Prevent and Manage VTE (Venous Thromboembolism) Risk  Recent Flowsheet Documentation  Taken 6/9/2024 1211 by Hedy Middleton RN  VTE Prevention/Management: SCDs (sequential compression devices) off  Intervention: Prevent Infection  Recent Flowsheet Documentation  Taken 6/9/2024 1211 by Hedy Middleton, RN  Infection Prevention:   rest/sleep promoted   hand hygiene promoted  Goal: Optimal Comfort and Wellbeing  Outcome: Progressing  Goal: Readiness for Transition of Care  Outcome: Progressing     Problem: Intestinal Obstruction  Goal: Optimal Bowel Function  Outcome: Progressing  Intervention: Promote Bowel Function  Recent Flowsheet Documentation  Taken 6/9/2024 1211 by Hedy Middleton, RN  Body Position: position changed independently  Head of Bed (HOB) Positioning: HOB at 20-30 degrees  Positioning/Transfer Devices:   pillows   in use  Goal: Fluid and Electrolyte Balance  Outcome: Progressing  Goal: Absence of Infection Signs and Symptoms  Outcome: Progressing  Goal: Optimize Nutrition Status  Outcome: Progressing  Goal: Optimal Pain Control and Function  Outcome: Progressing     Problem: Skin Injury Risk Increased  Goal: Skin Health and Integrity  Outcome: Progressing  Intervention: Plan: Nurse Driven Intervention: Moisture Management  Recent Flowsheet Documentation  Taken 6/9/2024 1211 by Hedy Middleton, RN  Moisture Interventions: Encourage regular toileting  Plan: Moisture Management: encourage regular toileting  Intervention: Plan: Nurse Driven Intervention: Friction and Shear  Recent Flowsheet Documentation  Taken 6/9/2024 1211 by Hedy Middleton RN  Friction/Shear Interventions: HOB 30 degrees or less  Intervention: Optimize Skin Protection  Recent Flowsheet Documentation  Taken 6/9/2024 1211 by Hedy Middleton, RN  Activity Management: activity adjusted per tolerance  Head of Bed (HOB)  Positioning: HOB at 20-30 degrees     Problem: Electrolyte Imbalance  Goal: Electrolyte Balance  Outcome: Progressing   Goal Outcome Evaluation:      Plan of Care Reviewed With: patient    Overall Patient Progress: improvingOverall Patient Progress: improving

## 2024-06-09 NOTE — PLAN OF CARE
"Goal Outcome Evaluation:      Plan of Care Reviewed With: patient    Overall Patient Progress: no changeOverall Patient Progress: no change    Outcome Evaluation: Pt alert and orientated. on RA. Denies pain. up Ind. refused Tylenol. Tele- Afib CVR. Son at bedside    Temp: 98  F (36.7  C) Temp src: Temporal BP: 108/62 Pulse: 80   Resp: 18 SpO2: 98 % O2 Device: None (Room air)         Problem: Adult Inpatient Plan of Care  Goal: Plan of Care Review  Description: The Plan of Care Review/Shift note should be completed every shift.  The Outcome Evaluation is a brief statement about your assessment that the patient is improving, declining, or no change.  This information will be displayed automatically on your shift  note.  Outcome: Not Progressing  Flowsheets (Taken 6/9/2024 0559)  Outcome Evaluation: Pt alert and orientated. on RA. Denies pain. up Ind. refused Tylenol. Tele- Afib CVR. Son at bedside  Plan of Care Reviewed With: patient  Overall Patient Progress: no change  Goal: Patient-Specific Goal (Individualized)  Description: You can add care plan individualizations to a care plan. Examples of Individualization might be:  \"Parent requests to be called daily at 9am for status\", \"I have a hard time hearing out of my right ear\", or \"Do not touch me to wake me up as it startles  me\".  Outcome: Not Progressing  Goal: Absence of Hospital-Acquired Illness or Injury  Outcome: Not Progressing  Intervention: Prevent Skin Injury  Recent Flowsheet Documentation  Taken 6/8/2024 2342 by Char Hermosillo RN  Body Position: position changed independently  Intervention: Prevent and Manage VTE (Venous Thromboembolism) Risk  Recent Flowsheet Documentation  Taken 6/8/2024 2342 by Char Hermosillo RN  VTE Prevention/Management: SCDs (sequential compression devices) off  Intervention: Prevent Infection  Recent Flowsheet Documentation  Taken 6/8/2024 2342 by Char Hermosillo RN  Infection Prevention:   rest/sleep promoted   hand " hygiene promoted  Goal: Optimal Comfort and Wellbeing  Outcome: Not Progressing  Goal: Readiness for Transition of Care  Outcome: Not Progressing     Problem: Intestinal Obstruction  Goal: Optimal Bowel Function  Outcome: Not Progressing  Intervention: Promote Bowel Function  Recent Flowsheet Documentation  Taken 6/8/2024 2342 by Char Hermosillo RN  Body Position: position changed independently  Head of Bed (HOB) Positioning: HOB at 20-30 degrees  Positioning/Transfer Devices:   pillows   in use  Goal: Fluid and Electrolyte Balance  Outcome: Not Progressing  Goal: Absence of Infection Signs and Symptoms  Outcome: Not Progressing  Goal: Optimize Nutrition Status  Outcome: Not Progressing  Goal: Optimal Pain Control and Function  Outcome: Not Progressing     Problem: Skin Injury Risk Increased  Goal: Skin Health and Integrity  Outcome: Not Progressing  Intervention: Plan: Nurse Driven Intervention: Moisture Management  Recent Flowsheet Documentation  Taken 6/8/2024 2342 by Char Hermosillo RN  Moisture Interventions: Encourage regular toileting  Intervention: Plan: Nurse Driven Intervention: Friction and Shear  Recent Flowsheet Documentation  Taken 6/8/2024 2342 by Char Hermosillo RN  Friction/Shear Interventions: HOB 30 degrees or less  Intervention: Optimize Skin Protection  Recent Flowsheet Documentation  Taken 6/8/2024 2342 by Char Hermosillo, RN  Activity Management: activity adjusted per tolerance  Head of Bed (HOB) Positioning: HOB at 20-30 degrees

## 2024-06-09 NOTE — PROGRESS NOTES
[facility]  Medicine Progress Note - Hospitalist Service  Date of Admission:  5/31/2024    Assessment & Plan   Eddie Peralta is a 84 year old male with a history of Cataract, Arthritis, Atrial Fibrillation, GERD, HLD, BPH, DDD, Spinal Stenosis who presents to the emergency department for evaluation of constipation and abdominal pain following a spinal fusion of L4 and L5 on 5/28 at [facility].      CT A/P on presentation showed mildly dilated ileal small bowel loops and ascending colon with tapering to non-dilated transverse/distal colon consistent with ileus.  He was monitored but unfortunately worsened over 24-48 hours.  XR showed worsened dilation of small bowel loops and lactic climbed.  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      General surgery & [facility] are following.  Patient had significant relief with tap water enemas and now is tolerating lo fiber diet.  Hospital course complicated by afib with RVR.        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  Last BM on 5/29.  Using Oxycodone for pain control after his spine surgery on 5/28.  On arrival, CT abd/pelvis reveals a mildly dilated distal ileal small bowel loops and ascending colon with tapering to nondilated transverse/distal colon consistent with ileus.  No discrete transition point.  Abd XR on AM 6/1 shows worsening.  Repeat CT on 6/1 shows increasingly distended cecum - now at 9.5 cm.  Discussed with surgery & [facility] - consideration for early shadi's (although cecum is generally >12 cm with this).  GI recommended tap water enema which helped with abdominal pain but patient has ongoing nausea and abdominal distention.   -General surgery & [facility] following, appreciate recs  -If patient worsens, will need to further consider neostigmine -- though this is not expected.  -Continue gentle IV fluid hydration, liquid  "diet, analgesics prn; try to avoid narcotics  -For pain control, the patient has IV dilaudid, scheduled tylenol  -Continue PPI.    Today 6/9.  - \"no change\" per overnight nursing  - stool output slowed down through the day yesterday, but already has had 4 loose stools out since midnight (as of noon).   - pt reports feeling substantially better, tolerating low fiber diet.     Chronic Afib with RVR episode during this hospital stay:  -Diagnosed 2022 s/p Cardioversion 7/2022 with recurrence shortly after.    -No hx of obstructive CAD or CHF per last Cardiology note 5/2023.    -Has been on IV fluid as he was n.p.o., will consider stopping it if he tolerates oral intake .  -Not on any medication for rate control.  -Early during this hospitalization, his HR was well controlled.    -Increased in the setting of pain/nausea and then jumped to 150-170s after ambulating on 6/2.  Cardiac telemetry is consistent with afib.  HR range is generally .  -metoprolol tartrate 25 mg BID seems to have resulted in minimal HR control. But he remains now with HR < 100 at rest.   -K goal >4, Mag goal >2  -Restarted Eliquis 6/5, heparin discontinued .      Lumbar Spinal Stenosis  S/p Transfacet/Transforaminal L4 to: L5 Posterior Spine Fusion with Instrumentation L4 to: L5 Transforaminal Lumbar Interbody Fusion L4 to: L5 on 5/28/24 at [facility]   -Continue brace and post op lifting and bending restrictions  -PT/SW consult      HLD  - resume pta Atorvastatin when taking po     GERD  Hx Conti's Esophagus  - Protonix 40 mg BID     BPH  -Continue Flomax as able with GI issues and monitor PVR         Diet: Low Fiber Diet  Snacks/Supplements Adult: Ensure Enlive; With Meals    DVT Prophylaxis: Heparin drip  Tse Catheter: Not present  Lines: None     Cardiac Monitoring: ACTIVE order. Indication: Tachyarrhythmias, acute (48 hours)  Code Status: Full Code      Clinically Significant Risk Factors        # Hypokalemia: Lowest K = 3.3 mmol/L in last 2 " "days, will replace as needed       # Hypoalbuminemia: Lowest albumin = 3.1 g/dL at 6/3/2024  6:56 AM, will monitor as appropriate            # Obesity: Estimated body mass index is 30.29 kg/m  as calculated from the following:    Height as of this encounter: 1.778 m (5' 10\").    Weight as of this encounter: 95.8 kg (211 lb 1.6 oz).             Disposition Plan     Medically Ready for Discharge: likely tomorrow.  Son is present at bedside and pt is eager for discharge back home in [facility].        Case discussed with pt and son at bedside. .  Nicholas Goncalves MD  Hospitalist Service  [facility]  Securely message with [employer] (more info)  Text page via [employer] Paging/Directory   ______________________________________________________________________    Interval History   Chart reviewed, pt interviewed.   I met and assumed care of the patient today.  Remained in A fib overnight with CVR and afeb.  Not requiring supplemental oxygen.    Feeling markedly improved.     Physical Exam   Vital Signs: Temp: 98  F (36.7  C) Temp src: Temporal BP: 108/62 Pulse: 80   Resp: 18 SpO2: 98 % O2 Device: None (Room air)    Weight: 211 lbs 1.6 oz    GEN:  Alert, oriented, appears ill but comfortable, no overt distress.  HEENT:  Normocephalic/atraumatic, no scleral icterus, no nasal discharge, mouth moist.  CV:  RRR without murmur  LUNGS:  Clear to auscultation bilaterally without rales/rhonchi/wheezing/retractions.  Mildly decreased breath sounds bases.  Symmetric chest rise on inhalation noted.  ABD:  Normo-active bowel sounds, soft, non-tender,  moderately distended, has back brace.  No guarding.  EXT:  Trace edema.  No cyanosis.  No new joint synovitis noted.  SKIN:  Dry to touch, no new exanthems noted in the visualized areas.    Medical Decision Making       Over 40 MINUTES SPENT BY ME on the date of service doing chart review, history, exam, documentation & further activities per the note.      Data "   Medications   Current Facility-Administered Medications   Medication Dose Route Frequency Provider Last Rate Last Admin     Current Facility-Administered Medications   Medication Dose Route Frequency Provider Last Rate Last Admin    acetaminophen (TYLENOL) tablet 975 mg  975 mg Oral Q8H Bienvenido Turner DO   975 mg at 06/08/24 0914    Or    acetaminophen (TYLENOL) Suppository 650 mg  650 mg Rectal Q8H Bienvenido Turner DO        apixaban ANTICOAGULANT (ELIQUIS) tablet 5 mg  5 mg Oral BID Kodi Lowe MD   5 mg at 06/08/24 2032    metoprolol tartrate (LOPRESSOR) tablet 25 mg  25 mg Oral BID Bienvenido Turner DO   25 mg at 06/08/24 2032    pantoprazole (PROTONIX) EC tablet 40 mg  40 mg Oral BID AC Bienvenido Turner DO   40 mg at 06/08/24 1713    sodium chloride (PF) 0.9% PF flush 3 mL  3 mL Intracatheter Q8H Poonam Ramsey PA-C   3 mL at 06/08/24 2035    tamsulosin (FLOMAX) capsule 0.4 mg  0.4 mg Oral At Bedtime Poonam Ramsey PA-C   0.4 mg at 06/08/24 2032     Labs and Imaging results below reviewed today.  Recent Labs   Lab 06/06/24  0657 06/04/24  0652 06/03/24  1005 06/03/24  0656   WBC 6.3  --  4.3 3.9*   HGB 12.9*  --  12.9* 13.1*   HCT 36.1*  --  38.0* 38.4*   MCV 91  --  94 94    168 169 168     Recent Labs   Lab 06/09/24  0643 06/08/24  0720 06/07/24  1450 06/07/24  0559 06/04/24  1511 06/04/24  0652 06/03/24  0656   NA  --  144  --   --   --  142 142   POTASSIUM 3.3* 3.5  3.5 3.6 3.0*   < > 3.3* 3.7   CHLORIDE  --  114*  --   --   --  111* 109*   CO2  --  17*  --   --   --  18* 22   ANIONGAP  --  13  --   --   --  13 11   GLC  --  110*  --   --   --  87 106*   BUN  --  14.6  --   --   --  29.2* 32.3*   CR  --  0.77  --   --   --  0.82 0.90   GFRESTIMATED  --  89  --   --   --  88 85   REN  --  8.1*  --   --   --  7.8* 7.9*   MAG 1.8 1.9  --  2.0   < > 2.0 2.0   PROTTOTAL  --   --   --   --   --   --  5.5*   ALBUMIN  --   --   --   --   --   --  3.1*   BILITOTAL  --   --   --   --    --   --  1.3*   ALKPHOS  --   --   --   --   --   --  56   AST  --   --   --   --   --   --  19   ALT  --   --   --   --   --   --  12    < > = values in this interval not displayed.     No results found for this or any previous visit (from the past 24 hour(s)).

## 2024-06-10 ENCOUNTER — APPOINTMENT (OUTPATIENT)
Dept: GENERAL RADIOLOGY | Facility: CLINIC | Age: 84
DRG: 394 | End: 2024-06-10
Attending: INTERNAL MEDICINE
Payer: MEDICARE

## 2024-06-10 LAB
MAGNESIUM SERPL-MCNC: 2 MG/DL (ref 1.7–2.3)
POTASSIUM SERPL-SCNC: 3.9 MMOL/L (ref 3.4–5.3)

## 2024-06-10 PROCEDURE — 120N000001 HC R&B MED SURG/OB

## 2024-06-10 PROCEDURE — 250N000011 HC RX IP 250 OP 636: Mod: JZ | Performed by: INTERNAL MEDICINE

## 2024-06-10 PROCEDURE — 36415 COLL VENOUS BLD VENIPUNCTURE: CPT | Performed by: INTERNAL MEDICINE

## 2024-06-10 PROCEDURE — 250N000013 HC RX MED GY IP 250 OP 250 PS 637: Performed by: INTERNAL MEDICINE

## 2024-06-10 PROCEDURE — 99232 SBSQ HOSP IP/OBS MODERATE 35: CPT | Performed by: INTERNAL MEDICINE

## 2024-06-10 PROCEDURE — 250N000013 HC RX MED GY IP 250 OP 250 PS 637: Performed by: STUDENT IN AN ORGANIZED HEALTH CARE EDUCATION/TRAINING PROGRAM

## 2024-06-10 PROCEDURE — 84132 ASSAY OF SERUM POTASSIUM: CPT | Performed by: STUDENT IN AN ORGANIZED HEALTH CARE EDUCATION/TRAINING PROGRAM

## 2024-06-10 PROCEDURE — 74019 RADEX ABDOMEN 2 VIEWS: CPT

## 2024-06-10 PROCEDURE — 250N000013 HC RX MED GY IP 250 OP 250 PS 637: Performed by: PHYSICIAN ASSISTANT

## 2024-06-10 PROCEDURE — 258N000003 HC RX IP 258 OP 636: Mod: JZ | Performed by: INTERNAL MEDICINE

## 2024-06-10 PROCEDURE — 250N000011 HC RX IP 250 OP 636: Mod: JZ | Performed by: STUDENT IN AN ORGANIZED HEALTH CARE EDUCATION/TRAINING PROGRAM

## 2024-06-10 PROCEDURE — 83735 ASSAY OF MAGNESIUM: CPT | Performed by: INTERNAL MEDICINE

## 2024-06-10 RX ORDER — OMEPRAZOLE
20 KIT 2 TIMES DAILY PRN
Status: DISCONTINUED | OUTPATIENT
Start: 2024-06-10 | End: 2024-06-10

## 2024-06-10 RX ORDER — SODIUM CHLORIDE 9 MG/ML
INJECTION, SOLUTION INTRAVENOUS CONTINUOUS
Status: DISCONTINUED | OUTPATIENT
Start: 2024-06-10 | End: 2024-06-11 | Stop reason: HOSPADM

## 2024-06-10 RX ORDER — MAGNESIUM SULFATE HEPTAHYDRATE 40 MG/ML
2 INJECTION, SOLUTION INTRAVENOUS ONCE
Status: COMPLETED | OUTPATIENT
Start: 2024-06-10 | End: 2024-06-10

## 2024-06-10 RX ADMIN — SIMETHICONE 40 MG: 20 SUSPENSION/ DROPS ORAL at 02:25

## 2024-06-10 RX ADMIN — PANTOPRAZOLE SODIUM 40 MG: 40 TABLET, DELAYED RELEASE ORAL at 16:35

## 2024-06-10 RX ADMIN — Medication 40 MG: at 03:26

## 2024-06-10 RX ADMIN — METOPROLOL TARTRATE 25 MG: 25 TABLET, FILM COATED ORAL at 09:47

## 2024-06-10 RX ADMIN — MAGNESIUM SULFATE HEPTAHYDRATE 2 G: 40 INJECTION, SOLUTION INTRAVENOUS at 09:47

## 2024-06-10 RX ADMIN — SODIUM CHLORIDE: 9 INJECTION, SOLUTION INTRAVENOUS at 06:16

## 2024-06-10 RX ADMIN — METHOCARBAMOL 500 MG: 500 TABLET ORAL at 01:35

## 2024-06-10 RX ADMIN — METOPROLOL TARTRATE 25 MG: 25 TABLET, FILM COATED ORAL at 21:25

## 2024-06-10 RX ADMIN — HYDROMORPHONE HYDROCHLORIDE 0.2 MG: 0.2 INJECTION, SOLUTION INTRAMUSCULAR; INTRAVENOUS; SUBCUTANEOUS at 03:26

## 2024-06-10 RX ADMIN — PANTOPRAZOLE SODIUM 40 MG: 40 TABLET, DELAYED RELEASE ORAL at 09:47

## 2024-06-10 RX ADMIN — SODIUM CHLORIDE: 9 INJECTION, SOLUTION INTRAVENOUS at 20:23

## 2024-06-10 RX ADMIN — TAMSULOSIN HYDROCHLORIDE 0.4 MG: 0.4 CAPSULE ORAL at 21:25

## 2024-06-10 ASSESSMENT — ACTIVITIES OF DAILY LIVING (ADL)
ADLS_ACUITY_SCORE: 29

## 2024-06-10 NOTE — PLAN OF CARE
"Goal Outcome Evaluation:      Plan of Care Reviewed With: patient, child    Overall Patient Progress: decliningOverall Patient Progress: declining    Outcome Evaluation: Pt alert and orientated. Pt complained of increasing pain and indigestion throughout the night. Scheduled Tylenol given. PRN Maalax, robaxin, mylicon, protonix and diludadid given. Pt had an episode of emesis MD paged, scan done. Pt NPO and denied NG tube. If continous to have N/V will need NG tube placement. Up ind. 2 small bm. Son at bedside.    Temp: 98.1  F (36.7  C) Temp src: Temporal BP: (!) 151/99 Pulse: 106   Resp: 18 SpO2: 96 % O2 Device: None (Room air)         Problem: Adult Inpatient Plan of Care  Goal: Plan of Care Review  Description: The Plan of Care Review/Shift note should be completed every shift.  The Outcome Evaluation is a brief statement about your assessment that the patient is improving, declining, or no change.  This information will be displayed automatically on your shift  note.  Outcome: Not Progressing  Flowsheets (Taken 6/10/2024 0557)  Outcome Evaluation: Pt alert and orientated. Pt complained of increasing pain and indigestion throughout the night. Scheduled Tylenol given. PRN Maalax, robaxin, mylicon, protonix and diludadid given. Pt had an episode of emesis MD pagesailaja, scan done. Pt NPO and denied NG tube. If continous to have N/V will need NG tube placement. Up ind. 2 small bm. Son at bedside.  Plan of Care Reviewed With:   patient   child  Overall Patient Progress: declining  Goal: Patient-Specific Goal (Individualized)  Description: You can add care plan individualizations to a care plan. Examples of Individualization might be:  \"Parent requests to be called daily at 9am for status\", \"I have a hard time hearing out of my right ear\", or \"Do not touch me to wake me up as it startles  me\".  Outcome: Not Progressing  Goal: Absence of Hospital-Acquired Illness or Injury  Outcome: Not Progressing  Intervention: Identify " and Manage Fall Risk  Recent Flowsheet Documentation  Taken 6/9/2024 2352 by Char Hermosillo RN  Safety Promotion/Fall Prevention:   safety round/check completed   nonskid shoes/slippers when out of bed   clutter free environment maintained  Intervention: Prevent Skin Injury  Recent Flowsheet Documentation  Taken 6/9/2024 2352 by Char Hermosillo RN  Body Position: position changed independently  Device Skin Pressure Protection: absorbent pad utilized/changed  Intervention: Prevent and Manage VTE (Venous Thromboembolism) Risk  Recent Flowsheet Documentation  Taken 6/9/2024 2352 by Char Hermosillo RN  VTE Prevention/Management: SCDs (sequential compression devices) off  Intervention: Prevent Infection  Recent Flowsheet Documentation  Taken 6/9/2024 2352 by Char Hermosillo RN  Infection Prevention:   rest/sleep promoted   hand hygiene promoted  Goal: Optimal Comfort and Wellbeing  Outcome: Not Progressing  Goal: Readiness for Transition of Care  Outcome: Not Progressing     Problem: Intestinal Obstruction  Goal: Optimal Bowel Function  Outcome: Not Progressing  Intervention: Promote Bowel Function  Recent Flowsheet Documentation  Taken 6/9/2024 2352 by Char Hermosillo RN  Body Position: position changed independently  Positioning/Transfer Devices:   pillows   in use  Chair: Upright in chair  Goal: Fluid and Electrolyte Balance  Outcome: Not Progressing  Goal: Absence of Infection Signs and Symptoms  Outcome: Not Progressing  Goal: Optimize Nutrition Status  Outcome: Not Progressing  Goal: Optimal Pain Control and Function  Outcome: Not Progressing     Problem: Skin Injury Risk Increased  Goal: Skin Health and Integrity  Outcome: Not Progressing  Intervention: Plan: Nurse Driven Intervention: Moisture Management  Recent Flowsheet Documentation  Taken 6/9/2024 2352 by Char Hermosillo RN  Moisture Interventions: Encourage regular toileting  Plan: Moisture Management: encourage regular toileting  Intervention:  Plan: Nurse Driven Intervention: Friction and Shear  Recent Flowsheet Documentation  Taken 6/9/2024 2352 by Char Hermosillo, RN  Friction/Shear Interventions: HOB 30 degrees or less  Intervention: Optimize Skin Protection  Recent Flowsheet Documentation  Taken 6/9/2024 2352 by Char Hermosillo, RN  Activity Management:   ambulated outside room   up in chair

## 2024-06-10 NOTE — CONSULTS
"Surgery-[facility]  Pt seen and examined, film from overnight reviewed, known to our service from last week's evaluation. Had emesis after eating chicken at dinner but now states that he is feeling \"like a million bucks.\" I suspect he is still getting back to normal and may have had some delayed transit or the meat may have become a bolus and caused a transient obstruction. Will trial clears again and discussed staying away from meats and roughage in the coming weeks. Likely appropriate for discharge tomorrow.  "

## 2024-06-10 NOTE — PROGRESS NOTES
Cross Cover    Called for increasing indigestion and now with large emesis  Has been here since 5/31 with post op ileus/? Ogilvik's after recent LV-5 spinal fusion     Had been doing well and diet advanced to Low fiber on 6/6 and had been tolerating.  Plan was for discharge tomorrow     Over the course of the day and now night has had increasing indigestion all night and then had large emesis early this morning     -AXR 2 view stat to look for recurrent ileus, ? Need to replace NG  -changed diet back to NPO     Addendum  AXR with SBO    I went up to speak with Mr Fabian and his son who is at the bedside  Diet upgraded to low fiber several days ago and intermittently tolerated.  2 -3 days ago had some turkey and felt like he was having more indigestion and abdominal distension but that passed.  He had been avoiding meat and then last night ate some chicken and had recurrence of indigestion but this time with large emesis     He states after he vomited he felt great but notes that seems like his indigestion and nausea are starting to come back.       I reviewed his AXR and has diffuse dilated SB with AF levels.  He also has an AF level in his stomach.  I don't note any colonic dilation and rads doesn't comment on it so does not look like ogilvik's at least at this point.     His exam is moderately distended but BS are positive and he is non tender arguing against a full SBO       Reconsulted Surgery given recurrence  NPO x ice chips and meds, resumed IVF NS 75 ml/hour   Offered NG but he'd like to forgo it at this time, may be open to if vomiting recurs.  Orders in for prn NG

## 2024-06-10 NOTE — PLAN OF CARE
"Mag replaced. Recheck in am.   Few small watery stools. Passing gas when he stools.   Voiding. IV fluids.   Abd bloating \"better now than last night.\"  Up independent with walker, GB, and brace.   Family at bedside.   Has been NPO today.   Pleasant.   Much improved from last night but no intake so far today.   1415 had clear liquids now- small amounts. Grandson at bedside.   Problem: Adult Inpatient Plan of Care  Goal: Plan of Care Review  Description: The Plan of Care Review/Shift note should be completed every shift.  The Outcome Evaluation is a brief statement about your assessment that the patient is improving, declining, or no change.  This information will be displayed automatically on your shift  note.  Outcome: Not Progressing  Flowsheets (Taken 6/10/2024 1244)  Plan of Care Reviewed With:   patient   family  Overall Patient Progress: no change  Goal: Patient-Specific Goal (Individualized)  Description: You can add care plan individualizations to a care plan. Examples of Individualization might be:  \"Parent requests to be called daily at 9am for status\", \"I have a hard time hearing out of my right ear\", or \"Do not touch me to wake me up as it startles  me\".  Outcome: Not Progressing  Goal: Absence of Hospital-Acquired Illness or Injury  Outcome: Not Progressing  Intervention: Identify and Manage Fall Risk  Recent Flowsheet Documentation  Taken 6/10/2024 1138 by Hedy Middleton, RN  Safety Promotion/Fall Prevention:   safety round/check completed   nonskid shoes/slippers when out of bed   clutter free environment maintained  Intervention: Prevent Skin Injury  Recent Flowsheet Documentation  Taken 6/10/2024 1138 by Hedy Middleton, RN  Body Position: position changed independently  Device Skin Pressure Protection: absorbent pad utilized/changed  Intervention: Prevent and Manage VTE (Venous Thromboembolism) Risk  Recent Flowsheet Documentation  Taken 6/10/2024 1138 by Hedy Middleton, " RN  VTE Prevention/Management: SCDs (sequential compression devices) off  Intervention: Prevent Infection  Recent Flowsheet Documentation  Taken 6/10/2024 1138 by Hedy Middleton RN  Infection Prevention:   rest/sleep promoted   hand hygiene promoted  Goal: Optimal Comfort and Wellbeing  Outcome: Not Progressing  Goal: Readiness for Transition of Care  Outcome: Not Progressing     Problem: Intestinal Obstruction  Goal: Optimal Bowel Function  Outcome: Not Progressing  Intervention: Promote Bowel Function  Recent Flowsheet Documentation  Taken 6/10/2024 1138 by Hedy Middleton, RN  Body Position: position changed independently  Positioning/Transfer Devices:   pillows   in use  Goal: Fluid and Electrolyte Balance  Outcome: Not Progressing  Goal: Absence of Infection Signs and Symptoms  Outcome: Not Progressing  Goal: Optimize Nutrition Status  Outcome: Not Progressing  Goal: Optimal Pain Control and Function  Outcome: Not Progressing     Problem: Skin Injury Risk Increased  Goal: Skin Health and Integrity  Outcome: Not Progressing  Intervention: Plan: Nurse Driven Intervention: Moisture Management  Recent Flowsheet Documentation  Taken 6/10/2024 1138 by Hedy Middleton RN  Moisture Interventions: Encourage regular toileting  Intervention: Plan: Nurse Driven Intervention: Friction and Shear  Recent Flowsheet Documentation  Taken 6/10/2024 1138 by Hedy Middleton, RN  Friction/Shear Interventions: HOB 30 degrees or less  Intervention: Optimize Skin Protection  Recent Flowsheet Documentation  Taken 6/10/2024 1138 by Hedy Middleton, RN  Activity Management:   ambulated outside room   up in chair     Problem: Electrolyte Imbalance  Goal: Electrolyte Balance  Outcome: Not Progressing   Goal Outcome Evaluation:      Plan of Care Reviewed With: patient, family    Overall Patient Progress: no changeOverall Patient Progress: no change

## 2024-06-10 NOTE — PROGRESS NOTES
[facility]  Medicine Progress Note - Hospitalist Service  Date of Admission:  5/31/2024    Assessment & Plan   Eddie Peralta is a 84 year old male he came to the emergency department on 5/31 for evaluation of constipation and abdominal pain following a spinal fusion of L4 and L5 on 5/28 at [facility].      PMH is significant for Cataract, Arthritis, Atrial Fibrillation, GERD, HLD, BPH, DDD, Spinal Stenosis.  Notably also had remote appendectomy.    CT A/P on presentation showed mildly dilated ileal small bowel loops and ascending colon with tapering to non-dilated transverse/distal colon consistent with ileus.  He was monitored but unfortunately worsened over 24-48 hours.  XR showed worsened dilation of small bowel loops and lactic climbed.  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      General surgery & [facility] were following but have signed off in the absence of clear surgical issue.  Patient had significant relief with tap water enemas and now is intermittently tolerating low fiber diet. He was doing very well but on 6/9-10 overnight, he again became uncomfortable.  He ultimately vomited and AXR showed persistent SBO.    Hospital course further complicated by afib with RVR.        Post Op Ileus, initially had significant colonic distention, but that is now resolved.   Ileus persists with intermittent obstructive symptoms.  Recurent SBO noted on AXR on 6/10.  -General surgery did see pt again today. They have indicated that they do not think a G Challenge is indicated.  -In the absence of Shadi's, no indication for neostigmine  -Resume gentle IV fluid hydration, liquid diet, analgesics prn; try to avoid narcotics  -For pain control, the patient has IV dilaudid, scheduled tylenol  -Continue PPI.    Today 6/10.  - overnight had a large emesis.AXR confirms recurrent SBO.  - Gen surgery returned to see the pt and recommended return  to clears with slower advancement of oral intake. Poss discharge tomorrow per Gen Surg, though this seems premature to me.     Chronic Afib with RVR episode during this hospital stay:  -Diagnosed 2022 s/p Cardioversion 7/2022 with recurrence shortly after.    -No hx of obstructive CAD or CHF per last Cardiology note 5/2023.    -Has been on IV fluid as he was n.p.o., will consider stopping it if he tolerates oral intake .  -Not on any medication for rate control.  -Early during this hospitalization, his HR was well controlled.    -Increased in the setting of pain/nausea and then jumped to 150-170s after ambulating on 6/2.  Cardiac telemetry is consistent with afib.  HR range is generally .  -metoprolol tartrate 25 mg BID seems to have resulted in minimal HR control. But he remains now with HR < 100 at rest.   -K goal >4, Mag goal >2  -Restarted Eliquis 6/5, heparin discontinued .      Lumbar Spinal Stenosis  S/p Transfacet/Transforaminal L4 to: L5 Posterior Spine Fusion with Instrumentation L4 to: L5 Transforaminal Lumbar Interbody Fusion L4 to: L5 on 5/28/24 at [facility]   -Continue brace and post op lifting and bending restrictions  -PT/SW consult      HLD  - resume pta Atorvastatin when taking po     GERD  Hx Conti's Esophagus  - Protonix 40 mg BID     BPH  -Continue Flomax as able with GI issues and monitor PVR         Diet: Snacks/Supplements Adult: Ensure Enlive; With Meals  NPO for Medical/Clinical Reasons Except for: Meds, Ice Chips    DVT Prophylaxis: Heparin drip  Tse Catheter: Not present  Lines: None     Cardiac Monitoring: None  Code Status: Full Code      Clinically Significant Risk Factors        # Hypokalemia: Lowest K = 3.3 mmol/L in last 2 days, will replace as needed       # Hypoalbuminemia: Lowest albumin = 3.1 g/dL at 6/3/2024  6:56 AM, will monitor as appropriate            # Obesity: Estimated body mass index is 30.29 kg/m  as calculated from the following:    Height as of this encounter:  "1.778 m (5' 10\").    Weight as of this encounter: 95.8 kg (211 lb 1.6 oz).             Disposition Plan     Medically Ready for Discharge: Discharge held due to further complication (recurrent emesis). Possibly tomorrow.       Case discussed with pt and son at bedside. .  Nicholas Goncalves MD  Hospitalist Service  [facility]  Securely message with [employer] (more info)  Text page via [facility] Paging/Directory   ______________________________________________________________________    Interval History   I note the patient had a large emesis overnight.  Dr. Garcia was called and saw him.  Abdominal x-ray showed SBO.  She requested that surgery again get involved and put him back to clear liquids only for now.  IVF resumed.    Over the course of the day today, Mr. Peralta has done well. He is now passing gas and feels \"normal\".  He will resume smaller volumes of lower fat and lower residue diet.  Poss discharge tomorrow.     Physical Exam   Vital Signs: Temp: 98.1  F (36.7  C) Temp src: Temporal BP: (!) 151/99 Pulse: 106   Resp: 18 SpO2: 96 % O2 Device: None (Room air)    Weight: 211 lbs 1.6 oz    GEN:  Alert, oriented, appears ill but comfortable, no overt distress.  HEENT:  Normocephalic/atraumatic, no scleral icterus, no nasal discharge, mouth moist.  CV:  RRR without murmur  LUNGS:  Clear to auscultation bilaterally without rales/rhonchi/wheezing/retractions.  Mildly decreased breath sounds bases.  Symmetric chest rise on inhalation noted.  ABD:  Still decreased bowel sounds, soft, moderately distended, has back brace.  No guarding.  EXT:  Trace edema.  No cyanosis.  No new joint synovitis noted.  SKIN:  Dry to touch, no new exanthems noted in the visualized areas.    Medical Decision Making       Over 40 MINUTES SPENT BY ME on the date of service doing chart review, history, exam, documentation & further activities per the note.      Data   Medications   Current Facility-Administered Medications "   Medication Dose Route Frequency Provider Last Rate Last Admin    sodium chloride 0.9 % infusion   Intravenous Continuous Mariella Garcia MD 75 mL/hr at 06/10/24 0616 New Bag at 06/10/24 0616     Current Facility-Administered Medications   Medication Dose Route Frequency Provider Last Rate Last Admin    acetaminophen (TYLENOL) tablet 975 mg  975 mg Oral Q8H Bienvenido Turner DO   975 mg at 06/09/24 2352    Or    acetaminophen (TYLENOL) Suppository 650 mg  650 mg Rectal Q8H Bienvenido Turner DO        apixaban ANTICOAGULANT (ELIQUIS) tablet 5 mg  5 mg Oral BID Kodi Lowe MD   5 mg at 06/09/24 2016    metoprolol tartrate (LOPRESSOR) tablet 25 mg  25 mg Oral BID Bienvenido Turner DO   25 mg at 06/09/24 2016    pantoprazole (PROTONIX) EC tablet 40 mg  40 mg Oral BID AC Bienvenido Turner DO   40 mg at 06/09/24 1556    sodium chloride (PF) 0.9% PF flush 3 mL  3 mL Intracatheter Q8H Poonam Ramsey PA-C   3 mL at 06/10/24 0327    tamsulosin (FLOMAX) capsule 0.4 mg  0.4 mg Oral At Bedtime Poonam Ramsey PA-C   0.4 mg at 06/09/24 2016     Labs and Imaging results below reviewed today.  Recent Labs   Lab 06/06/24  0657 06/04/24  0652 06/03/24  1005   WBC 6.3  --  4.3   HGB 12.9*  --  12.9*   HCT 36.1*  --  38.0*   MCV 91  --  94    168 169     Recent Labs   Lab 06/10/24  0723 06/09/24  2122 06/09/24  1454 06/09/24  0643 06/08/24  0720 06/04/24  1511 06/04/24  0652   NA  --   --   --  138 144  --  142   POTASSIUM 3.9 4.4 3.4 3.3*  3.3* 3.5  3.5   < > 3.3*   CHLORIDE  --   --   --  106 114*  --  111*   CO2  --   --   --  19* 17*  --  18*   ANIONGAP  --   --   --  13 13  --  13   GLC  --   --   --  103* 110*  --  87   BUN  --   --   --  11.4 14.6  --  29.2*   CR  --   --   --  0.80 0.77  --  0.82   GFRESTIMATED  --   --   --  88 89  --  88   REN  --   --   --  8.4* 8.1*  --  7.8*   MAG 2.0  --   --  1.8 1.9   < > 2.0    < > = values in this interval not displayed.     Recent Results (from the past 24  hour(s))   XR Abdomen 2 Views    Narrative    EXAM: XR ABDOMEN 2 VIEWS  LOCATION: [facility]  DATE: 6/10/2024    INDICATION: recurrent n v, increased abdominal distension concerning for recurrent ileus vs SBO vs Shadi's  COMPARISON: Abdominal x-ray on 6/8/2024      Impression    IMPRESSION: Upright and supine views of the abdomen and pelvis were obtained. Multiple dilated small bowel loops with multiple air-fluid levels, findings consistent with small bowel obstruction. No free peritoneal or portal venous gas. Postsurgical   changes of lower lumbar spine fusion.

## 2024-06-11 VITALS
HEIGHT: 70 IN | TEMPERATURE: 97.4 F | OXYGEN SATURATION: 98 % | HEART RATE: 96 BPM | SYSTOLIC BLOOD PRESSURE: 121 MMHG | BODY MASS INDEX: 30.22 KG/M2 | DIASTOLIC BLOOD PRESSURE: 74 MMHG | RESPIRATION RATE: 18 BRPM | WEIGHT: 211.1 LBS

## 2024-06-11 PROBLEM — I48.91 ATRIAL FIBRILLATION WITH RVR (H): Status: ACTIVE | Noted: 2024-06-11

## 2024-06-11 LAB
MAGNESIUM SERPL-MCNC: 2.1 MG/DL (ref 1.7–2.3)
POTASSIUM SERPL-SCNC: 3.8 MMOL/L (ref 3.4–5.3)

## 2024-06-11 PROCEDURE — 83735 ASSAY OF MAGNESIUM: CPT | Performed by: INTERNAL MEDICINE

## 2024-06-11 PROCEDURE — 36415 COLL VENOUS BLD VENIPUNCTURE: CPT | Performed by: INTERNAL MEDICINE

## 2024-06-11 PROCEDURE — 84132 ASSAY OF SERUM POTASSIUM: CPT | Performed by: INTERNAL MEDICINE

## 2024-06-11 PROCEDURE — 99238 HOSP IP/OBS DSCHRG MGMT 30/<: CPT | Performed by: INTERNAL MEDICINE

## 2024-06-11 PROCEDURE — 258N000003 HC RX IP 258 OP 636: Mod: JZ | Performed by: INTERNAL MEDICINE

## 2024-06-11 PROCEDURE — 99231 SBSQ HOSP IP/OBS SF/LOW 25: CPT | Performed by: PHYSICIAN ASSISTANT

## 2024-06-11 PROCEDURE — 250N000013 HC RX MED GY IP 250 OP 250 PS 637: Performed by: STUDENT IN AN ORGANIZED HEALTH CARE EDUCATION/TRAINING PROGRAM

## 2024-06-11 RX ORDER — METOPROLOL TARTRATE 25 MG/1
25 TABLET, FILM COATED ORAL 2 TIMES DAILY
Qty: 60 TABLET | Refills: 0 | Status: SHIPPED | OUTPATIENT
Start: 2024-06-11

## 2024-06-11 RX ADMIN — PANTOPRAZOLE SODIUM 40 MG: 40 TABLET, DELAYED RELEASE ORAL at 16:25

## 2024-06-11 RX ADMIN — PANTOPRAZOLE SODIUM 40 MG: 40 TABLET, DELAYED RELEASE ORAL at 08:38

## 2024-06-11 RX ADMIN — APIXABAN 5 MG: 5 TABLET, FILM COATED ORAL at 11:52

## 2024-06-11 RX ADMIN — SODIUM CHLORIDE: 9 INJECTION, SOLUTION INTRAVENOUS at 08:39

## 2024-06-11 RX ADMIN — METOPROLOL TARTRATE 25 MG: 25 TABLET, FILM COATED ORAL at 08:38

## 2024-06-11 ASSESSMENT — ACTIVITIES OF DAILY LIVING (ADL)
ADLS_ACUITY_SCORE: 29
ADLS_ACUITY_SCORE: 27
ADLS_ACUITY_SCORE: 29
ADLS_ACUITY_SCORE: 27
ADLS_ACUITY_SCORE: 29
ADLS_ACUITY_SCORE: 27
ADLS_ACUITY_SCORE: 29
ADLS_ACUITY_SCORE: 29
ADLS_ACUITY_SCORE: 27
ADLS_ACUITY_SCORE: 29
ADLS_ACUITY_SCORE: 29

## 2024-06-11 NOTE — PLAN OF CARE
Goal Outcome Evaluation:      Plan of Care Reviewed With: patient, child    Overall Patient Progress: improvingOverall Patient Progress: improving    Patient discharged to home at approximately 1730 with son. IV taken out, discharge instructions gone through and all questions answered. Discharge medications given to patient, all personal items taken. Patient escorted off the unit on a wheelchair by staff.

## 2024-06-11 NOTE — DISCHARGE SUMMARY
[facility] Discharge Summary    Eddie Peralta MRN# [id number]   Age: 84 year old YOB: 1940     Date of Admission:  5/31/2024  Date of Discharge::  6/11/2024  Admitting Physician:  Bienvenido Turner DO  Discharge Physician:  Nicholas Goncalves MD            Admission Diagnoses:   Ileus (H) [K56.7]  Generalized abdominal pain [R10.84]          Discharge Diagnosis:     Principal Problem:    Ileus (H)  Active Problems:    Generalized abdominal pain         Procedures:   CT Abdomen and pelvis with contrast  AXR, multiple  CT Abdomen and pelvis with contrast  AXR, multiple          Medications Prior to Admission:     Medications Prior to Admission   Medication Sig Dispense Refill Last Dose    acetaminophen (TYLENOL) 325 MG tablet Take 975 mg by mouth every 6 hours as needed for mild pain       apixaban ANTICOAGULANT (ELIQUIS) 5 MG tablet Take 5 mg by mouth 2 times daily       atorvastatin (LIPITOR) 40 MG tablet Take 40 mg by mouth daily   5/30/2024 at am    methocarbamol (ROBAXIN) 500 MG tablet Take 500 mg by mouth every 6 hours as needed for muscle spasms       omeprazole (PRILOSEC) 40 MG DR capsule Take 40 mg by mouth daily   5/30/2024 at am    oxyCODONE (ROXICODONE) 5 MG tablet Take 5 mg by mouth every 4 hours as needed for severe pain       sennosides (SENOKOT) 8.6 MG tablet Take 2 tablets by mouth 2 times daily Since started on oxycodone       tamsulosin (FLOMAX) 0.4 MG capsule Take 0.4 mg by mouth at bedtime   5/30/2024 at              Discharge Medications:     Discharge Medication List as of 6/11/2024  5:04 PM        START taking these medications    Details   metoprolol tartrate (LOPRESSOR) 25 MG tablet Take 1 tablet (25 mg) by mouth 2 times daily, Disp-60 tablet, R-0, E-Prescribe           CONTINUE these medications which have NOT CHANGED    Details   acetaminophen (TYLENOL) 325 MG tablet Take 975 mg by mouth every 6 hours as needed for mild pain, Historical      apixaban  ANTICOAGULANT (ELIQUIS) 5 MG tablet Take 5 mg by mouth 2 times daily, Historical      atorvastatin (LIPITOR) 40 MG tablet Take 40 mg by mouth daily, Historical      methocarbamol (ROBAXIN) 500 MG tablet Take 500 mg by mouth every 6 hours as needed for muscle spasms, Historical      omeprazole (PRILOSEC) 40 MG DR capsule Take 40 mg by mouth daily, Historical      sennosides (SENOKOT) 8.6 MG tablet Take 2 tablets by mouth 2 times daily Since started on oxycodone, Historical      tamsulosin (FLOMAX) 0.4 MG capsule Take 0.4 mg by mouth at bedtime, Historical           STOP taking these medications       oxyCODONE (ROXICODONE) 5 MG tablet Comments:   Reason for Stopping:                     Consultations:   Consultation during this admission received from gastroenterology and surgery          Brief History of Illness:   Eddie Peralta is a 84 year old male who came to the emergency department at [facility] on 5/31 for evaluation of constipation and abdominal pain following a spinal fusion of L4 and L5 on 5/28 at [facility].            Hospital Course:   CT A/P on presentation showed mildly dilated ileal small bowel loops and ascending colon with tapering to non-dilated transverse/distal colon consistent with ileus.  He was monitored but unfortunately worsened over 24-48 hours.  XR showed worsened dilation of small bowel loops and lactic climbed.  Therefore a repeat CT a/p was ordered and showed increased dilation of the cecum concerning for early Altair's.       General surgery & [facility] were consulted but did not need to intervene with any procedures.  They subseqeuently signed off in the absence of clear surgical issue.  Mr. Peralta had significant relief with tap water enemas andwas gradually advanced to a low fiber diet. His course was prolonged due to recurrent vomiting, but ultimately, he did manage to advance his diet without emesis.      Hospital course further complicated by afib with RVR. Mr. Peralta has Chronic a fib at  "baseline, but developed RVR with the stress of this illness. His heart rate was adequately controlled with metoprolol tartrate 25 mg BID.    /74 (BP Location: Right arm)   Pulse 96   Temp 97.4  F (36.3  C) (Temporal)   Resp 18   Ht 1.778 m (5' 10\")   Wt 95.8 kg (211 lb 1.6 oz)   SpO2 98%   BMI 30.29 kg/m    On the date of discharge, Mr. Peralta is alert and comfortable in NAD.   HEENT: No focal muscular asymmetry  Chest: CTA, no increased WOB  COR: IRRIR without murmur  Abd: Soft, NTND without palpable mass          Discharge Instructions and Follow-Up:     Discharge diet: Low residue   Discharge activity: Restrictions are based on previous (post-op) recommendations.   Discharge follow-up: With PMD as needed.            Discharge Disposition:     Discharged to home      Attestation:  I have reviewed today's vital signs, notes, medications, labs and imaging.    Nicholas Goncalves MD     "

## 2024-06-11 NOTE — PLAN OF CARE
"RN Shift [id number]    Goal Outcome Evaluation    Plan of Care Reviewed With: patient, child    Overall Patient Progress: improving    Patient vital signs are at baseline: Yes  Patient able to ambulate as they were prior to admission or with assist devices provided by therapies during their stay:  Yes  Patient MUST void prior to discharge:  Yes  Patient able to tolerate oral intake:  Yes; clear liquid diet tolerated  Pain has adequate pain control using Oral analgesics:  Yes; no pain reported  Does patient have an identified :  Yes  Has goal D/C date and time been discussed with patient:  Yes     A/Ox4. Independent with walker. Son at bedside; cooperative. Refused scheduled Tylenol. Denies pain and no N/V or abdominal discomfort reported. Hypoactive bowel sounds in all quadrants. Patient stated \"I am hernández.\" Possible discharge today.     Problem: Adult Inpatient Plan of Care  Goal: Plan of Care Review  Description: The Plan of Care Review/Shift note should be completed every shift.  The Outcome Evaluation is a brief statement about your assessment that the patient is improving, declining, or no change.  This information will be displayed automatically on your shift  note.  Outcome: Progressing  Flowsheets (Taken 6/11/2024 0628)  Plan of Care Reviewed With:   patient   child  Overall Patient Progress: improving  Goal: Patient-Specific Goal (Individualized)  Description: You can add care plan individualizations to a care plan. Examples of Individualization might be:  \"Parent requests to be called daily at 9am for status\", \"I have a hard time hearing out of my right ear\", or \"Do not touch me to wake me up as it startles  me\".  Outcome: Progressing  Goal: Absence of Hospital-Acquired Illness or Injury  Outcome: Progressing  Intervention: Identify and Manage Fall Risk  Recent Flowsheet Documentation  Taken 6/11/2024 0100 by Margarita Colin RN  Safety Promotion/Fall Prevention:   activity supervised   " assistive device/personal items within reach   clutter free environment maintained   nonskid shoes/slippers when out of bed  Intervention: Prevent Skin Injury  Recent Flowsheet Documentation  Taken 6/11/2024 0100 by Margarita Colin RN  Body Position: position changed independently  Skin Protection: adhesive use limited  Device Skin Pressure Protection: absorbent pad utilized/changed  Intervention: Prevent and Manage VTE (Venous Thromboembolism) Risk  Recent Flowsheet Documentation  Taken 6/11/2024 0100 by Margarita Colin RN  VTE Prevention/Management: SCDs (sequential compression devices) off  Intervention: Prevent Infection  Recent Flowsheet Documentation  Taken 6/11/2024 0100 by Margarita Colin RN  Infection Prevention:   rest/sleep promoted   hand hygiene promoted  Goal: Optimal Comfort and Wellbeing  Outcome: Progressing  Goal: Readiness for Transition of Care  Outcome: Progressing     Problem: Intestinal Obstruction  Goal: Optimal Bowel Function  Outcome: Progressing  Intervention: Promote Bowel Function  Recent Flowsheet Documentation  Taken 6/11/2024 0100 by Margarita Colin RN  Body Position: position changed independently  Head of Bed (HOB) Positioning: HOB at 20-30 degrees  Positioning/Transfer Devices:   pillows   in use  Goal: Fluid and Electrolyte Balance  Outcome: Progressing  Intervention: Monitor and Manage Hypovolemia  Recent Flowsheet Documentation  Taken 6/11/2024 0100 by Margarita Colin RN  Fluid/Electrolyte Management: fluids provided  Goal: Absence of Infection Signs and Symptoms  Outcome: Progressing  Intervention: Prevent or Manage Infection  Recent Flowsheet Documentation  Taken 6/11/2024 0100 by Margarita Colin RN  Infection Management: aseptic technique maintained  Goal: Optimize Nutrition Status  Outcome: Progressing  Goal: Optimal Pain Control and Function  Outcome: Progressing     Problem: Skin Injury Risk  Increased  Goal: Skin Health and Integrity  Outcome: Progressing  Intervention: Plan: Nurse Driven Intervention: Moisture Management  Recent Flowsheet Documentation  Taken 6/11/2024 0100 by Margarita Colin RN  Moisture Interventions: Encourage regular toileting  Skin Appearance: Normal (no redness or breakdown)  Plan: Moisture Management: encourage regular toileting  Intervention: Plan: Nurse Driven Intervention: Friction and Shear  Recent Flowsheet Documentation  Taken 6/11/2024 0100 by Margarita Colin RN  Friction/Shear Interventions: HOB 30 degrees or less  Intervention: Optimize Skin Protection  Recent Flowsheet Documentation  Taken 6/11/2024 0100 by Margarita Colin, KARINA  Pressure Reduction Techniques: frequent weight shift encouraged  Skin Protection: adhesive use limited  Activity Management: up ad mary  Head of Bed (HOB) Positioning: HOB at 20-30 degrees     Problem: Electrolyte Imbalance  Goal: Electrolyte Balance  Outcome: Progressing  Intervention: Monitor and Manage Electrolyte Imbalance  Recent Flowsheet Documentation  Taken 6/11/2024 0100 by Margarita Colin RN  Fluid/Electrolyte Management: fluids provided

## 2024-06-11 NOTE — PROGRESS NOTES
"[facility]  General Surgery Progress Note  CHART CHECK:    SUBJECTIVE:  No new issues per chart, feeling well and tolerated diet.    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 Recommend soft diet without roughage or meat x 2 weeks.  OK to discharge from surgical perspective         Nova Edwards PA-C            "

## 2024-06-11 NOTE — PLAN OF CARE
"Goal Outcome Evaluation:      Plan of Care Reviewed With: patient, child    Overall Patient Progress: improvingOverall Patient Progress: improving    3pm-11pm RN    Patient VS are at baseline: Yes  Patient able to ambulate as they were prior to admission or with assist devices provided by therapy during their stay: Yes  Patient must void prior to discharge:Yes  Patient able to tolerate oral intake: Tolerated clear liquid diet  Patient has adequate pain control using oral analgesics: Yes   Patient denied pain, hypoactive bowel sounds.  Walking out in the hallway with family assisting.  Problem: Adult Inpatient Plan of Care  Goal: Plan of Care Review  Description: The Plan of Care Review/Shift note should be completed every shift.  The Outcome Evaluation is a brief statement about your assessment that the patient is improving, declining, or no change.  This information will be displayed automatically on your shift  note.  Outcome: Progressing  Flowsheets (Taken 6/10/2024 2344)  Plan of Care Reviewed With:   patient   child  Overall Patient Progress: improving  Goal: Patient-Specific Goal (Individualized)  Description: You can add care plan individualizations to a care plan. Examples of Individualization might be:  \"Parent requests to be called daily at 9am for status\", \"I have a hard time hearing out of my right ear\", or \"Do not touch me to wake me up as it startles  me\".  Outcome: Progressing  Goal: Absence of Hospital-Acquired Illness or Injury  Outcome: Progressing  Intervention: Identify and Manage Fall Risk  Recent Flowsheet Documentation  Taken 6/10/2024 1640 by Janet Stanley, RN  Safety Promotion/Fall Prevention:   activity supervised   assistive device/personal items within reach   clutter free environment maintained   nonskid shoes/slippers when out of bed  Intervention: Prevent Skin Injury  Recent Flowsheet Documentation  Taken 6/10/2024 1640 by Janet Stanley, RN  Body Position: position changed " independently  Intervention: Prevent and Manage VTE (Venous Thromboembolism) Risk  Recent Flowsheet Documentation  Taken 6/10/2024 1640 by Janet Stanley RN  VTE Prevention/Management: SCDs (sequential compression devices) off  Goal: Optimal Comfort and Wellbeing  Outcome: Progressing  Goal: Readiness for Transition of Care  Outcome: Progressing     Problem: Intestinal Obstruction  Goal: Optimal Bowel Function  Outcome: Progressing  Intervention: Promote Bowel Function  Recent Flowsheet Documentation  Taken 6/10/2024 1640 by Janet Stanley, RN  Body Position: position changed independently  Positioning/Transfer Devices:   pillows   in use  Chair: Recline and up in chair  Goal: Fluid and Electrolyte Balance  Outcome: Progressing  Goal: Absence of Infection Signs and Symptoms  Outcome: Progressing  Goal: Optimize Nutrition Status  Outcome: Progressing  Goal: Optimal Pain Control and Function  Outcome: Progressing     Problem: Skin Injury Risk Increased  Goal: Skin Health and Integrity  Outcome: Progressing  Intervention: Optimize Skin Protection  Recent Flowsheet Documentation  Taken 6/10/2024 1640 by Janet Stanley, RN  Activity Management: up in chair     Problem: Electrolyte Imbalance  Goal: Electrolyte Balance  Outcome: Progressing

## 2024-06-11 NOTE — PLAN OF CARE
"Goal Outcome Evaluation:      Plan of Care Reviewed With: patient, child    Overall Patient Progress: improvingOverall Patient Progress: improving    Outcome Evaluation: Pt AOx4, up SBA with back brace, minimal pain today. Loose stool x2, no nausea, Tolerated low fiber diet, SL. Son at bedside. Likely discharge later this afternoon if can tolerate another meal with no side effects.      Problem: Adult Inpatient Plan of Care  Goal: Plan of Care Review  Description: The Plan of Care Review/Shift note should be completed every shift.  The Outcome Evaluation is a brief statement about your assessment that the patient is improving, declining, or no change.  This information will be displayed automatically on your shift  note.  Outcome: Adequate for Care Transition  Flowsheets (Taken 6/11/2024 1357)  Outcome Evaluation: Pt AOx4, up SBA with back brace, minimal pain today. Loose stool x2, no nausea, Tolerated low fiber diet, SL. Son at bedside. Likely discharge later this afternoon if can tolerate another meal with no side effects.  Plan of Care Reviewed With:   patient   child  Overall Patient Progress: improving  Goal: Patient-Specific Goal (Individualized)  Description: You can add care plan individualizations to a care plan. Examples of Individualization might be:  \"Parent requests to be called daily at 9am for status\", \"I have a hard time hearing out of my right ear\", or \"Do not touch me to wake me up as it startles  me\".  Outcome: Adequate for Care Transition  Goal: Absence of Hospital-Acquired Illness or Injury  Outcome: Adequate for Care Transition  Intervention: Identify and Manage Fall Risk  Recent Flowsheet Documentation  Taken 6/11/2024 0820 by Corina Navas, RN  Safety Promotion/Fall Prevention:   activity supervised   assistive device/personal items within reach   clutter free environment maintained   nonskid shoes/slippers when out of bed  Intervention: Prevent Skin Injury  Recent Flowsheet " Documentation  Taken 6/11/2024 0900 by Corina Navas RN  Body Position: position changed independently  Taken 6/11/2024 0820 by Corina Navas RN  Body Position: position changed independently  Skin Protection: adhesive use limited  Device Skin Pressure Protection: absorbent pad utilized/changed  Intervention: Prevent and Manage VTE (Venous Thromboembolism) Risk  Recent Flowsheet Documentation  Taken 6/11/2024 0820 by Corina Navas RN  VTE Prevention/Management: SCDs (sequential compression devices) off  Intervention: Prevent Infection  Recent Flowsheet Documentation  Taken 6/11/2024 0820 by Corina Navas RN  Infection Prevention:   rest/sleep promoted   hand hygiene promoted  Goal: Optimal Comfort and Wellbeing  Outcome: Adequate for Care Transition  Intervention: Monitor Pain and Promote Comfort  Recent Flowsheet Documentation  Taken 6/11/2024 0900 by Corina Navas RN  Pain Management Interventions: declines  Goal: Readiness for Transition of Care  Outcome: Adequate for Care Transition     Problem: Intestinal Obstruction  Goal: Optimal Bowel Function  Outcome: Adequate for Care Transition  Intervention: Promote Bowel Function  Recent Flowsheet Documentation  Taken 6/11/2024 0900 by Corina Navas RN  Body Position: position changed independently  Head of Bed (HOB) Positioning: HOB at 20-30 degrees  Positioning/Transfer Devices:   pillows   in use  Chair: Upright in chair  Taken 6/11/2024 0820 by Corina Navas RN  Body Position: position changed independently  Head of Bed (HOB) Positioning: HOB at 20-30 degrees  Positioning/Transfer Devices:   pillows   in use  Goal: Fluid and Electrolyte Balance  Outcome: Adequate for Care Transition  Intervention: Monitor and Manage Hypovolemia  Recent Flowsheet Documentation  Taken 6/11/2024 0820 by Corina Navas RN  Fluid/Electrolyte Management: fluids provided  Goal: Absence of Infection Signs and Symptoms  Outcome: Adequate for Care Transition  Intervention:  Prevent or Manage Infection  Recent Flowsheet Documentation  Taken 6/11/2024 0820 by Corina Navas RN  Infection Management: aseptic technique maintained  Goal: Optimize Nutrition Status  Outcome: Adequate for Care Transition  Goal: Optimal Pain Control and Function  Outcome: Adequate for Care Transition  Intervention: Prevent or Manage Pain  Recent Flowsheet Documentation  Taken 6/11/2024 0900 by Corina Navas RN  Pain Management Interventions: declines     Problem: Skin Injury Risk Increased  Goal: Skin Health and Integrity  Outcome: Adequate for Care Transition  Intervention: Plan: Nurse Driven Intervention: Moisture Management  Recent Flowsheet Documentation  Taken 6/11/2024 0900 by Corina Navas RN  Bathing/Skin Care:   dressed/undressed   linen changed  Taken 6/11/2024 0820 by Corina Navas RN  Moisture Interventions: Encourage regular toileting  Skin Appearance: Normal (no redness or breakdown)  Plan: Moisture Management: encourage regular toileting  Intervention: Plan: Nurse Driven Intervention: Friction and Shear  Recent Flowsheet Documentation  Taken 6/11/2024 0820 by Corina Navas RN  Friction/Shear Interventions: HOB 30 degrees or less  Intervention: Optimize Skin Protection  Recent Flowsheet Documentation  Taken 6/11/2024 0900 by Corina Navas RN  Activity Management: up ad mary  Head of Bed (HOB) Positioning: HOB at 20-30 degrees  Taken 6/11/2024 0820 by Corina Navas RN  Pressure Reduction Techniques: frequent weight shift encouraged  Skin Protection: adhesive use limited  Activity Management: up ad mary  Head of Bed (HOB) Positioning: HOB at 20-30 degrees     Problem: Electrolyte Imbalance  Goal: Electrolyte Balance  Outcome: Adequate for Care Transition  Intervention: Monitor and Manage Electrolyte Imbalance  Recent Flowsheet Documentation  Taken 6/11/2024 0820 by Corina Navas RN  Fluid/Electrolyte Management: fluids provided

## 2024-06-11 NOTE — DISCHARGE INSTRUCTIONS
Following a Low-Fiber Diet    Overview    What is a low-fiber diet?  A low-fiber diet contains foods that don't create much waste (stool). This diet slows down your bowels and gives them a chance to rest.  Fiber is the part of plants that your body can't digest. It gives bulk to your diet and helps you feel full. It also helps you have regular bowel movements.  But a low-fiber diet is often needed after bowel surgery or when you have a flare-up of a bowel problem. You might be asked to follow this diet if you have Crohn's disease, ulcerative colitis, diverticulitis, or another condition that can cause swelling, pain, or narrowing of your bowels.  In some cases, you may start with a liquid diet (no solid food) and then add low-fiber foods.  Depending on your health problem, you may eat low-fiber foods for just a short time. Or you may need to follow this diet for the rest of your life.  You may need to take extra vitamins and minerals while you're on this diet. Your doctor will let you know if you need these supplements.  What foods are okay to eat on a low-fiber diet?  You can eat most types of food on a low-fiber diet, including meats, white breads, and cooked fruits and vegetables. Your doctor can tell you how much fiber you can eat each day.  Here are some examples of low-fiber foods:  Cooked red meat, fish, or poultry  Eggs  Dairy products, such as milk, ice cream, cheese, cottage cheese, and yogurt (as long as they don't contain fruits or nuts)  Well-cooked vegetables that don't have skins or seeds  Canned or cooked fruit with no skin or seeds, such as applesauce  Fruit juices without pulp  Refined white breads  White rice or white pasta  Saltine crackers  What foods should you NOT eat?  Here are some foods you should not eat while on a low-fiber diet:  Whole grains, such as bran, oatmeal, and brown rice  Whole wheat pasta  Whole wheat or whole-grain breads  Any foods that have seeds or nuts  Any raw  vegetables  Foods that cause gas, such as brussels sprouts, broccoli, and cauliflower (raw or cooked)  Corn  Granola  Lentils, split peas, and beans such as black or red beans  Berries  Dried fruit, such as prunes, raisins, or figs  Raw fruit  Popcorn

## 2024-06-13 ENCOUNTER — PATIENT OUTREACH (OUTPATIENT)
Dept: CARE COORDINATION | Facility: CLINIC | Age: 84
End: 2024-06-13
Payer: MEDICARE

## 2024-06-13 NOTE — PROGRESS NOTES
Connected Care Resource Center:   [facility] Contact  [facility]/Voicemail     Clinical Data: Post-Discharge Outreach     Outreach attempted x 2.  Left message on patient's voicemail, providing [facility]'s central phone number of [phone] ([phone]) for questions/concerns and/or to schedule an appt with an [facility] provider, if they do not have a PCP.      Plan:  [facility] will do no further outreaches at this time.       KHANH Mathur  Connected Care Resource [place], [facility]    *Connected Care Resource Team does NOT follow patient ongoing. Referrals are identified based on internal discharge reports and the outreach is to ensure patient has an understanding of their discharge instructions.